# Patient Record
Sex: FEMALE | Race: WHITE | NOT HISPANIC OR LATINO | Employment: OTHER | URBAN - NONMETROPOLITAN AREA
[De-identification: names, ages, dates, MRNs, and addresses within clinical notes are randomized per-mention and may not be internally consistent; named-entity substitution may affect disease eponyms.]

---

## 2017-01-18 ENCOUNTER — OFFICE VISIT (OUTPATIENT)
Dept: OTOLARYNGOLOGY | Facility: CLINIC | Age: 82
End: 2017-01-18

## 2017-01-18 VITALS
DIASTOLIC BLOOD PRESSURE: 79 MMHG | HEART RATE: 85 BPM | RESPIRATION RATE: 20 BRPM | HEIGHT: 59 IN | BODY MASS INDEX: 25 KG/M2 | TEMPERATURE: 97.6 F | SYSTOLIC BLOOD PRESSURE: 145 MMHG | WEIGHT: 124 LBS

## 2017-01-18 DIAGNOSIS — D48.9 NEOPLASM OF UNCERTAIN BEHAVIOR: ICD-10-CM

## 2017-01-18 DIAGNOSIS — C44.42 SQUAMOUS CELL CARCINOMA OF SCALP: Primary | ICD-10-CM

## 2017-01-18 PROCEDURE — 88305 TISSUE EXAM BY PATHOLOGIST: CPT | Performed by: OTOLARYNGOLOGY

## 2017-01-18 PROCEDURE — 99213 OFFICE O/P EST LOW 20 MIN: CPT | Performed by: OTOLARYNGOLOGY

## 2017-01-18 PROCEDURE — 11100 PR BIOPSY OF SKIN LESION: CPT | Performed by: OTOLARYNGOLOGY

## 2017-01-18 RX ORDER — QUINAPRIL 40 MG/1
40 TABLET ORAL 2 TIMES DAILY
COMMUNITY
Start: 2016-10-27 | End: 2019-06-29 | Stop reason: HOSPADM

## 2017-01-18 RX ORDER — TIMOLOL MALEATE 5 MG/ML
1 SOLUTION/ DROPS OPHTHALMIC 2 TIMES DAILY
Status: ON HOLD | COMMUNITY
Start: 2016-10-18 | End: 2019-06-29 | Stop reason: ALTCHOICE

## 2017-01-18 RX ORDER — LEVOTHYROXINE SODIUM 50 MCG
50 TABLET ORAL DAILY
COMMUNITY
Start: 2016-10-27

## 2017-01-18 RX ORDER — BIMATOPROST 0.01 %
1 DROPS OPHTHALMIC (EYE) NIGHTLY
COMMUNITY
Start: 2016-12-31

## 2017-01-18 RX ORDER — TOPIRAMATE 25 MG/1
25 TABLET ORAL AS NEEDED
Status: ON HOLD | COMMUNITY
Start: 2016-10-27 | End: 2019-03-01

## 2017-01-18 RX ORDER — AMLODIPINE BESYLATE 5 MG/1
5 TABLET ORAL 2 TIMES DAILY
Status: ON HOLD | COMMUNITY
Start: 2016-11-10 | End: 2019-03-01

## 2017-01-18 RX ORDER — ALLOPURINOL 300 MG/1
300 TABLET ORAL DAILY
COMMUNITY
Start: 2016-12-11

## 2017-01-18 NOTE — MR AVS SNAPSHOT
Kamille Saul   2017 9:45 AM   Office Visit    Dept Phone:  [phone]   Encounter #:  [id number]    Provider:  Tyree Flanagan MD   Department:  [facility]                Your Full Care Plan              Your Updated Medication List          This list is accurate as of: 17 10:51 AM.  Always use your most recent med list.                allopurinol 300 MG tablet   Commonly known as:  ZYLOPRIM       amLODIPine 5 MG tablet   Commonly known as:  NORVASC       LUMIGAN 0.01 % ophthalmic drops   Generic drug:  bimatoprost       quinapril 40 MG tablet   Commonly known as:  ACCUPRIL       SYNTHROID 50 MCG tablet   Generic drug:  levothyroxine       timolol 0.5 % ophthalmic solution   Commonly known as:  TIMOPTIC       topiramate 25 MG tablet   Commonly known as:  TOPAMAX               You Were Diagnosed With        Codes Comments    Squamous cell carcinoma of scalp    -  Primary ICD-10-CM: C44.42  ICD-9-CM: 173.42     Neoplasm of uncertain behavior     ICD-10-CM: D48.9  ICD-9-CM: 238.9       Instructions    Biopsy care instructions were given.       Patient Instructions History      Upcoming Appointments     Visit Type Date Time Department    FOLLOW UP 2017  9:45 AM [facility]    FOLLOW UP 2017 10:00 AM [facility]      MyChart Signup     Our records indicate that you have declined [facility]. If you would like to sign up for MyChart, please email [email or web address] or call [phone] to obtain an activation code.             Other Info from Your Visit           Your Appointments     2017 10:00 AM CDT   Follow Up with Tyree Flanagan MD   [facility] (--)    [address] [place] [address]   [address] [place] [address] [id number] [phone]           Arrive 15 minutes prior to appointment.              Allergies     Biaxin [Clarithromycin]        Reason for Visit     Follow-up 6 month follow up of scalp lesion  "      Vital Signs     Blood Pressure Pulse Temperature Respirations Height Weight    145/79 85 97.6 °F (36.4 °C) 20 59\" (149.9 cm) 124 lb (56.2 kg)    Body Mass Index Smoking Status                25.04 kg/m2 Former Smoker          Problems and Diagnoses Noted     Squamous cell carcinoma of scalp    -  Primary    Tumor            "

## 2017-01-18 NOTE — PROGRESS NOTES
Skin Cancer Follow-up    Kamille Saul presents for a cancer surveillance and skin check following excision of a squamous cell carcinoma  of the scalp with full-thickness skin graft on 01/26/16.     Subjective: Since surgery, she is doing fairly well, but the following is reported: scaling and scabbing of FTSG. Symptoms denied postoperatively include fever, chills, bleeding and drainage. The patient states the pain has ceased since surgery.     Objective:   Pathology review: Pathology was reviewed previously. . Pathology demonstrates a diagnosis of well-differentiated cystic squamous cell carcinoma, invasive.  with close margins. The deep margin is negative for malignancy with tumor extending to within less than 1mm of the deep margin of surgical excision. The peripheral margins are negative.     Patient presents for follow-up general head and neck skin examination.  Patient has a history of squamous cell carcinoma.  She has not noted recurrence.  The patient has not noted new worrisome lesions. However, she does report rough, scaling, scabbed areas to her FTSG on her scalp. She states these areas come and go. Nothing makes these area better or worse. A biopsy has not been performed.     Past Medical History   Diagnosis Date   • Arthritis    • Fibromyalgia    • Squamous cell carcinoma      Past Surgical History   Procedure Laterality Date   • Appendectomy     • Cataract extraction     • Skin lesion excision       cyst removal    • Skin graft     • Tonsillectomy     • Hysterectomy       History reviewed. No pertinent family history.  Social History   Substance Use Topics   • Smoking status: Former Smoker   • Smokeless tobacco: None   • Alcohol use None     Allergies:  Biaxin [clarithromycin]    Current Outpatient Prescriptions:   •  allopurinol (ZYLOPRIM) 300 MG tablet, , Disp: , Rfl:   •  amLODIPine (NORVASC) 5 MG tablet, , Disp: , Rfl:   •  LUMIGAN 0.01 % ophthalmic drops, , Disp: , Rfl:   •  quinapril (ACCUPRIL)  "40 MG tablet, , Disp: , Rfl:   •  SYNTHROID 50 MCG tablet, , Disp: , Rfl:   •  timolol (TIMOPTIC) 0.5 % ophthalmic solution, , Disp: , Rfl:   •  topiramate (TOPAMAX) 25 MG tablet, , Disp: , Rfl:     Review of Systems   Constitutional: Negative for activity change, appetite change, chills, fatigue, fever and unexpected weight change.   HENT: Negative for congestion, dental problem, facial swelling and nosebleeds.    Eyes: Negative for discharge and redness.   Skin: Negative for color change, pallor and rash.   Hematological: Negative for adenopathy. Does not bruise/bleed easily.          Visit Vitals   • /79   • Pulse 85   • Temp 97.6 °F (36.4 °C)   • Resp 20   • Ht 59\" (149.9 cm)   • Wt 124 lb (56.2 kg)   • BMI 25.04 kg/m2       Physical Exam   Constitutional: She is oriented to person, place, and time. She appears well-developed and well-nourished. She is cooperative. No distress.   HENT:   Head: Normocephalic and atraumatic.   Right Ear: External ear normal.   Left Ear: External ear normal.   Nose: Nose normal.   Mouth/Throat: Oropharynx is clear and moist.   Eyes: Conjunctivae, EOM and lids are normal.   Neck: Phonation normal. Neck supple.   Pulmonary/Chest: Effort normal. No stridor. No respiratory distress.   Lymphadenopathy:        Head (right side): No submental, no submandibular, no tonsillar, no preauricular, no posterior auricular and no occipital adenopathy present.        Head (left side): No submental, no submandibular, no tonsillar, no preauricular, no posterior auricular and no occipital adenopathy present.     She has no cervical adenopathy.   Neurological: She is alert and oriented to person, place, and time. She has normal strength.   Skin: Skin is warm, dry and intact. Lesion (2mm small area of ulceration to center of FTSG) noted.   Psychiatric: She has a normal mood and affect. Her speech is normal and behavior is normal. Judgment and thought content normal.   Vitals " reviewed.      Assessment:  Kamille was seen today for follow-up.    Diagnoses and all orders for this visit:    Squamous cell carcinoma of scalp    Neoplasm of uncertain behavior        Plan:    2mm punch biopsy to ulceration on FTSG to rule out recurrence due to close deep margins- see procedure note. I will call with pathology results. If benign, will follow-up in 6 months.     Patient Instructions   Biopsy care instructions were given.      Return in about 6 months (around 7/18/2017), or if symptoms worsen or fail to improve, for Recheck.      Tyree Flanagan MD   01/18/17  10:36 AM

## 2017-01-20 LAB
CYTO UR: NORMAL
LAB AP CASE REPORT: NORMAL
LAB AP CLINICAL INFORMATION: NORMAL
Lab: NORMAL
PATH REPORT.FINAL DX SPEC: NORMAL
PATH REPORT.GROSS SPEC: NORMAL

## 2017-01-23 ENCOUNTER — TELEPHONE (OUTPATIENT)
Dept: OTOLARYNGOLOGY | Facility: CLINIC | Age: 82
End: 2017-01-23

## 2017-01-23 NOTE — TELEPHONE ENCOUNTER
Patient notified of scalp biopsy result from 01/18/2017-Advised patient to keep follow up in 6 months-  ---- Message from Tyree Flanagan MD sent at 1/18/2017 10:47 AM [facility] -----

## 2017-03-23 ENCOUNTER — HOSPITAL ENCOUNTER (OUTPATIENT)
Facility: HOSPITAL | Age: 82
Setting detail: OBSERVATION
LOS: 1 days | Discharge: HOME | End: 2017-03-25
Attending: EMERGENCY MEDICINE | Admitting: FAMILY MEDICINE

## 2017-03-23 ENCOUNTER — HOSPITAL ENCOUNTER (EMERGENCY)
Age: 82
Discharge: LEFT BEFORE DISCHARGE | End: 2017-03-23

## 2017-03-23 ENCOUNTER — APPOINTMENT (OUTPATIENT)
Dept: CT IMAGING | Facility: HOSPITAL | Age: 82
End: 2017-03-23

## 2017-03-23 ENCOUNTER — APPOINTMENT (OUTPATIENT)
Dept: GENERAL RADIOLOGY | Facility: HOSPITAL | Age: 82
End: 2017-03-23

## 2017-03-23 VITALS
HEART RATE: 91 BPM | SYSTOLIC BLOOD PRESSURE: 189 MMHG | WEIGHT: 125 LBS | BODY MASS INDEX: 23 KG/M2 | HEIGHT: 62 IN | RESPIRATION RATE: 16 BRPM | TEMPERATURE: 97.5 F | DIASTOLIC BLOOD PRESSURE: 93 MMHG | OXYGEN SATURATION: 98 %

## 2017-03-23 DIAGNOSIS — R41.82 ALTERED MENTAL STATUS, UNSPECIFIED ALTERED MENTAL STATUS TYPE: ICD-10-CM

## 2017-03-23 DIAGNOSIS — E87.6 HYPOKALEMIA: ICD-10-CM

## 2017-03-23 DIAGNOSIS — N30.00 ACUTE CYSTITIS WITHOUT HEMATURIA: ICD-10-CM

## 2017-03-23 DIAGNOSIS — N17.9 ACUTE KIDNEY INJURY (HCC): Primary | ICD-10-CM

## 2017-03-23 LAB
ALBUMIN SERPL-MCNC: 4 G/DL (ref 3.5–5)
ALBUMIN/GLOB SERPL: 1.5 G/DL (ref 1.1–2.5)
ALP SERPL-CCNC: 61 U/L (ref 24–120)
ALT SERPL W P-5'-P-CCNC: 16 U/L (ref 0–54)
ANION GAP SERPL CALCULATED.3IONS-SCNC: 13 MMOL/L (ref 4–13)
AST SERPL-CCNC: 35 U/L (ref 7–45)
BASOPHILS # BLD AUTO: 0.11 10*3/MM3 (ref 0–0.2)
BASOPHILS NFR BLD AUTO: 1.2 % (ref 0–2)
BILIRUB SERPL-MCNC: 0.3 MG/DL (ref 0.1–1)
BUN BLD-MCNC: 30 MG/DL (ref 5–21)
BUN/CREAT SERPL: 19.7 (ref 7–25)
CALCIUM SPEC-SCNC: 9.4 MG/DL (ref 8.4–10.4)
CHLORIDE SERPL-SCNC: 106 MMOL/L (ref 98–110)
CO2 SERPL-SCNC: 21 MMOL/L (ref 24–31)
CREAT BLD-MCNC: 1.52 MG/DL (ref 0.5–1.4)
DEPRECATED RDW RBC AUTO: 49.6 FL (ref 40–54)
EOSINOPHIL # BLD AUTO: 0.29 10*3/MM3 (ref 0–0.7)
EOSINOPHIL NFR BLD AUTO: 3.3 % (ref 0–4)
ERYTHROCYTE [DISTWIDTH] IN BLOOD BY AUTOMATED COUNT: 14.5 % (ref 12–15)
GFR SERPL CREATININE-BSD FRML MDRD: 32 ML/MIN/1.73
GLOBULIN UR ELPH-MCNC: 2.7 GM/DL
GLUCOSE BLD-MCNC: 130 MG/DL (ref 70–100)
GLUCOSE BLDC GLUCOMTR-MCNC: 136 MG/DL (ref 70–130)
HCT VFR BLD AUTO: 35.1 % (ref 37–47)
HGB BLD-MCNC: 11.5 G/DL (ref 12–16)
IMM GRANULOCYTES # BLD: 0.03 10*3/MM3 (ref 0–0.03)
IMM GRANULOCYTES NFR BLD: 0.3 % (ref 0–5)
LYMPHOCYTES # BLD AUTO: 2.09 10*3/MM3 (ref 0.72–4.86)
LYMPHOCYTES NFR BLD AUTO: 23.6 % (ref 15–45)
MCH RBC QN AUTO: 30.7 PG (ref 28–32)
MCHC RBC AUTO-ENTMCNC: 32.8 G/DL (ref 33–36)
MCV RBC AUTO: 93.9 FL (ref 82–98)
MONOCYTES # BLD AUTO: 1.03 10*3/MM3 (ref 0.19–1.3)
MONOCYTES NFR BLD AUTO: 11.6 % (ref 4–12)
NEUTROPHILS # BLD AUTO: 5.32 10*3/MM3 (ref 1.87–8.4)
NEUTROPHILS NFR BLD AUTO: 60 % (ref 39–78)
PLATELET # BLD AUTO: 238 10*3/MM3 (ref 130–400)
PMV BLD AUTO: 9.5 FL (ref 6–12)
POTASSIUM BLD-SCNC: 3.7 MMOL/L (ref 3.5–5.3)
PROT SERPL-MCNC: 6.7 G/DL (ref 6.3–8.7)
RBC # BLD AUTO: 3.74 10*6/MM3 (ref 4.2–5.4)
SODIUM BLD-SCNC: 140 MMOL/L (ref 135–145)
WBC NRBC COR # BLD: 8.87 10*3/MM3 (ref 4.8–10.8)

## 2017-03-23 PROCEDURE — 80053 COMPREHEN METABOLIC PANEL: CPT | Performed by: EMERGENCY MEDICINE

## 2017-03-23 PROCEDURE — 71010 HC CHEST PA OR AP: CPT

## 2017-03-23 PROCEDURE — 82962 GLUCOSE BLOOD TEST: CPT

## 2017-03-23 PROCEDURE — 4500000002 HC ER NO CHARGE

## 2017-03-23 PROCEDURE — 70450 CT HEAD/BRAIN W/O DYE: CPT

## 2017-03-23 PROCEDURE — 99285 EMERGENCY DEPT VISIT HI MDM: CPT

## 2017-03-23 PROCEDURE — 85025 COMPLETE CBC W/AUTO DIFF WBC: CPT | Performed by: EMERGENCY MEDICINE

## 2017-03-23 RX ORDER — LABETALOL HYDROCHLORIDE 5 MG/ML
20 INJECTION, SOLUTION INTRAVENOUS ONCE
Status: DISCONTINUED | OUTPATIENT
Start: 2017-03-23 | End: 2017-03-24

## 2017-03-23 RX ORDER — SODIUM CHLORIDE 0.9 % (FLUSH) 0.9 %
10 SYRINGE (ML) INJECTION AS NEEDED
Status: DISCONTINUED | OUTPATIENT
Start: 2017-03-23 | End: 2017-03-24

## 2017-03-24 ENCOUNTER — APPOINTMENT (OUTPATIENT)
Dept: MRI IMAGING | Facility: HOSPITAL | Age: 82
End: 2017-03-24

## 2017-03-24 PROBLEM — N39.0 UTI (URINARY TRACT INFECTION): Status: ACTIVE | Noted: 2017-03-24

## 2017-03-24 PROBLEM — R41.82 ALTERED MENTAL STATUS: Status: ACTIVE | Noted: 2017-03-24

## 2017-03-24 LAB
ANION GAP SERPL CALCULATED.3IONS-SCNC: 11 MMOL/L (ref 4–13)
BACTERIA UR QL AUTO: ABNORMAL /HPF
BILIRUB UR QL STRIP: NEGATIVE
BUN BLD-MCNC: 25 MG/DL (ref 5–21)
BUN/CREAT SERPL: 17.1 (ref 7–25)
CALCIUM SPEC-SCNC: 9.5 MG/DL (ref 8.4–10.4)
CHLORIDE SERPL-SCNC: 108 MMOL/L (ref 98–110)
CLARITY UR: ABNORMAL
CO2 SERPL-SCNC: 25 MMOL/L (ref 24–31)
COLOR UR: YELLOW
CREAT BLD-MCNC: 1.46 MG/DL (ref 0.5–1.4)
DEPRECATED RDW RBC AUTO: 49.6 FL (ref 40–54)
ERYTHROCYTE [DISTWIDTH] IN BLOOD BY AUTOMATED COUNT: 14.6 % (ref 12–15)
GFR SERPL CREATININE-BSD FRML MDRD: 34 ML/MIN/1.73
GLUCOSE BLD-MCNC: 96 MG/DL (ref 70–100)
GLUCOSE UR STRIP-MCNC: NEGATIVE MG/DL
HCT VFR BLD AUTO: 38.3 % (ref 37–47)
HGB BLD-MCNC: 12.3 G/DL (ref 12–16)
HGB UR QL STRIP.AUTO: ABNORMAL
HOLD SPECIMEN: NORMAL
HOLD SPECIMEN: NORMAL
HYALINE CASTS UR QL AUTO: ABNORMAL /LPF
KETONES UR QL STRIP: NEGATIVE
LEUKOCYTE ESTERASE UR QL STRIP.AUTO: ABNORMAL
MCH RBC QN AUTO: 30.2 PG (ref 28–32)
MCHC RBC AUTO-ENTMCNC: 32.1 G/DL (ref 33–36)
MCV RBC AUTO: 94.1 FL (ref 82–98)
NITRITE UR QL STRIP: POSITIVE
PH UR STRIP.AUTO: 6 [PH] (ref 5–8)
PLATELET # BLD AUTO: 246 10*3/MM3 (ref 130–400)
PMV BLD AUTO: 9.4 FL (ref 6–12)
POTASSIUM BLD-SCNC: 3.6 MMOL/L (ref 3.5–5.3)
PROT UR QL STRIP: ABNORMAL
RBC # BLD AUTO: 4.07 10*6/MM3 (ref 4.2–5.4)
RBC # UR: ABNORMAL /HPF
REF LAB TEST METHOD: ABNORMAL
SODIUM BLD-SCNC: 144 MMOL/L (ref 135–145)
SP GR UR STRIP: 1.01 (ref 1–1.03)
SQUAMOUS #/AREA URNS HPF: ABNORMAL /HPF
UROBILINOGEN UR QL STRIP: ABNORMAL
WBC NRBC COR # BLD: 8.3 10*3/MM3 (ref 4.8–10.8)
WBC UR QL AUTO: ABNORMAL /HPF
WHOLE BLOOD HOLD SPECIMEN: NORMAL
WHOLE BLOOD HOLD SPECIMEN: NORMAL

## 2017-03-24 PROCEDURE — G0378 HOSPITAL OBSERVATION PER HR: HCPCS

## 2017-03-24 PROCEDURE — 70551 MRI BRAIN STEM W/O DYE: CPT

## 2017-03-24 PROCEDURE — 87086 URINE CULTURE/COLONY COUNT: CPT | Performed by: EMERGENCY MEDICINE

## 2017-03-24 PROCEDURE — 25010000002 HYDRALAZINE PER 20 MG: Performed by: INTERNAL MEDICINE

## 2017-03-24 PROCEDURE — 81001 URINALYSIS AUTO W/SCOPE: CPT | Performed by: EMERGENCY MEDICINE

## 2017-03-24 PROCEDURE — 85027 COMPLETE CBC AUTOMATED: CPT | Performed by: INTERNAL MEDICINE

## 2017-03-24 PROCEDURE — 80048 BASIC METABOLIC PNL TOTAL CA: CPT | Performed by: INTERNAL MEDICINE

## 2017-03-24 PROCEDURE — 87077 CULTURE AEROBIC IDENTIFY: CPT | Performed by: EMERGENCY MEDICINE

## 2017-03-24 PROCEDURE — 96374 THER/PROPH/DIAG INJ IV PUSH: CPT

## 2017-03-24 PROCEDURE — 25010000002 CEFTRIAXONE: Performed by: INTERNAL MEDICINE

## 2017-03-24 PROCEDURE — 87186 SC STD MICRODIL/AGAR DIL: CPT | Performed by: EMERGENCY MEDICINE

## 2017-03-24 RX ORDER — HYDRALAZINE HYDROCHLORIDE 20 MG/ML
10 INJECTION INTRAMUSCULAR; INTRAVENOUS EVERY 4 HOURS PRN
Status: DISCONTINUED | OUTPATIENT
Start: 2017-03-24 | End: 2017-03-25 | Stop reason: HOSPADM

## 2017-03-24 RX ORDER — SODIUM CHLORIDE 450 MG/100ML
100 INJECTION, SOLUTION INTRAVENOUS CONTINUOUS
Status: DISCONTINUED | OUTPATIENT
Start: 2017-03-24 | End: 2017-03-25 | Stop reason: HOSPADM

## 2017-03-24 RX ORDER — TIMOLOL MALEATE 5 MG/ML
1 SOLUTION/ DROPS OPHTHALMIC DAILY
Status: DISCONTINUED | OUTPATIENT
Start: 2017-03-24 | End: 2017-03-25 | Stop reason: HOSPADM

## 2017-03-24 RX ORDER — ALLOPURINOL 300 MG/1
300 TABLET ORAL DAILY
Status: DISCONTINUED | OUTPATIENT
Start: 2017-03-24 | End: 2017-03-25 | Stop reason: HOSPADM

## 2017-03-24 RX ORDER — NIACIN 500 MG
500 TABLET ORAL 2 TIMES DAILY WITH MEALS
COMMUNITY
End: 2019-06-29 | Stop reason: HOSPADM

## 2017-03-24 RX ORDER — ACETAMINOPHEN 325 MG/1
650 TABLET ORAL EVERY 6 HOURS PRN
Status: DISCONTINUED | OUTPATIENT
Start: 2017-03-24 | End: 2017-03-25 | Stop reason: HOSPADM

## 2017-03-24 RX ORDER — AMLODIPINE BESYLATE 5 MG/1
5 TABLET ORAL EVERY 12 HOURS SCHEDULED
Status: DISCONTINUED | OUTPATIENT
Start: 2017-03-24 | End: 2017-03-25 | Stop reason: HOSPADM

## 2017-03-24 RX ORDER — QUINAPRIL 20 MG/1
40 TABLET ORAL DAILY PRN
Status: DISCONTINUED | OUTPATIENT
Start: 2017-03-24 | End: 2017-03-25 | Stop reason: HOSPADM

## 2017-03-24 RX ORDER — LEVOTHYROXINE SODIUM 0.05 MG/1
50 TABLET ORAL EVERY MORNING
Status: DISCONTINUED | OUTPATIENT
Start: 2017-03-24 | End: 2017-03-25 | Stop reason: HOSPADM

## 2017-03-24 RX ADMIN — BIMATOPROST 1 DROP: 0.1 SOLUTION/ DROPS OPHTHALMIC at 20:45

## 2017-03-24 RX ADMIN — HYDRALAZINE HYDROCHLORIDE 10 MG: 20 INJECTION INTRAMUSCULAR; INTRAVENOUS at 02:16

## 2017-03-24 RX ADMIN — SODIUM CHLORIDE 100 ML/HR: 4.5 INJECTION, SOLUTION INTRAVENOUS at 18:11

## 2017-03-24 RX ADMIN — CEFTRIAXONE 1 G: 1 INJECTION, POWDER, FOR SOLUTION INTRAMUSCULAR; INTRAVENOUS at 03:37

## 2017-03-24 RX ADMIN — AMLODIPINE BESYLATE 5 MG: 5 TABLET ORAL at 21:39

## 2017-03-24 RX ADMIN — ACETAMINOPHEN 650 MG: 325 TABLET ORAL at 10:53

## 2017-03-24 RX ADMIN — HYDRALAZINE HYDROCHLORIDE 10 MG: 20 INJECTION INTRAMUSCULAR; INTRAVENOUS at 20:41

## 2017-03-24 RX ADMIN — TIMOLOL MALEATE 1 DROP: 5 SOLUTION/ DROPS OPHTHALMIC at 18:10

## 2017-03-24 RX ADMIN — SODIUM CHLORIDE 100 ML/HR: 4.5 INJECTION, SOLUTION INTRAVENOUS at 02:15

## 2017-03-24 NOTE — ED PROVIDER NOTES
Subjective   Patient is a 89 y.o. female presenting with altered mental status.   Altered Mental Status   Presenting symptoms: behavior changes, confusion, disorientation and memory loss    Presenting symptoms: no lethargy    Severity:  Moderate  Most recent episode:  Today  Episode history:  Single  Timing:  Constant  Progression:  Unchanged  Chronicity:  New  Context: not alcohol use, not dementia, not drug use, not head injury, not homeless, taking medications as prescribed, not nursing home resident, not recent change in medication, not recent illness and not recent infection    Associated symptoms: no abdominal pain, normal movement, no agitation, no bladder incontinence, no decreased appetite, no depression, no difficulty breathing, no eye deviation, no fever, no hallucinations, no headaches, no light-headedness, no nausea, no palpitations, no rash, no seizures, no slurred speech, no suicidal behavior, no visual change, no vomiting and no weakness        Review of Systems   Constitutional: Negative.  Negative for appetite change, chills, decreased appetite, diaphoresis, fatigue and fever.   HENT: Negative.  Negative for congestion, dental problem, drooling, facial swelling, nosebleeds, sinus pressure and trouble swallowing.    Eyes: Negative.  Negative for discharge and redness.   Respiratory: Negative.  Negative for apnea, cough, choking, chest tightness, shortness of breath, wheezing and stridor.    Cardiovascular: Negative.  Negative for chest pain, palpitations and leg swelling.   Gastrointestinal: Negative.  Negative for abdominal distention, abdominal pain, diarrhea, nausea and vomiting.   Endocrine: Negative.  Negative for cold intolerance, heat intolerance and polydipsia.   Genitourinary: Negative.  Negative for bladder incontinence, frequency and urgency.   Musculoskeletal: Negative.  Negative for arthralgias, back pain, joint swelling and myalgias.   Skin: Negative.  Negative for color change, pallor  and rash.   Allergic/Immunologic: Negative.  Negative for environmental allergies, food allergies and immunocompromised state.   Neurological: Negative.  Negative for dizziness, seizures, syncope, speech difficulty, weakness, light-headedness, numbness and headaches.   Hematological: Negative.  Negative for adenopathy.   Psychiatric/Behavioral: Positive for confusion and memory loss. Negative for agitation and hallucinations.   All other systems reviewed and are negative.      Past Medical History:   Diagnosis Date   • Arthritis    • Fibromyalgia    • Hypertension    • Squamous cell carcinoma        Allergies   Allergen Reactions   • Biaxin [Clarithromycin]        Past Surgical History:   Procedure Laterality Date   • APPENDECTOMY     • CATARACT EXTRACTION     • HYSTERECTOMY     • SKIN GRAFT     • SKIN LESION EXCISION      cyst removal    • TONSILLECTOMY         History reviewed. No pertinent family history.    Social History     Social History   • Marital status:      Spouse name: N/A   • Number of children: N/A   • Years of education: N/A     Social History Main Topics   • Smoking status: Former Smoker   • Smokeless tobacco: None   • Alcohol use Yes      Comment: Occasional wine   • Drug use: No   • Sexual activity: Not Asked     Other Topics Concern   • None     Social History Narrative           Objective   Physical Exam   Constitutional: She is oriented to person, place, and time. She appears well-developed.  Non-toxic appearance. No distress.   HENT:   Head: Normocephalic and atraumatic.   Mouth/Throat: Uvula is midline and mucous membranes are normal.   Eyes: Lids are normal. Pupils are equal, round, and reactive to light. Lids are everted and swept, no foreign bodies found.   Neck: Trachea normal, normal range of motion, full passive range of motion without pain and phonation normal. Neck supple. Normal carotid pulses and no JVD present. Carotid bruit is not present. No rigidity. No tracheal deviation  present. No Brudzinski's sign and no Kernig's sign noted. No thyromegaly present.   Cardiovascular: Normal rate, regular rhythm, normal heart sounds, intact distal pulses and normal pulses.    Pulmonary/Chest: Effort normal and breath sounds normal. No stridor. No apnea and no tachypnea. No respiratory distress.   Abdominal: Soft. Normal appearance, normal aorta and bowel sounds are normal. She exhibits no distension and no mass. There is no hepatosplenomegaly. There is no tenderness. There is no guarding.   Musculoskeletal: Normal range of motion.       Vascular Status -  Her exam exhibits right foot vasculature normal. Her exam exhibits no right foot edema. Her exam exhibits left foot vasculature normal. Her exam exhibits no left foot edema.  Neurological: She is alert and oriented to person, place, and time. She has normal strength and normal reflexes. She is not disoriented. She displays normal reflexes. No cranial nerve deficit or sensory deficit. She exhibits normal muscle tone. Coordination normal. GCS eye subscore is 4. GCS verbal subscore is 5. GCS motor subscore is 6.   Reflex Scores:       Tricep reflexes are 2+ on the right side and 2+ on the left side.       Bicep reflexes are 2+ on the right side and 2+ on the left side.       Patellar reflexes are 2+ on the right side and 2+ on the left side.       Achilles reflexes are 2+ on the right side and 2+ on the left side.  Skin: Skin is warm, dry and intact. She is not diaphoretic. No cyanosis. No pallor. Nails show no clubbing.   Nursing note and vitals reviewed.      Procedures         ED Course  ED Course   Comment By Time   Patient is not a TPA candidate the symptoms started at approximately 3:30 PM and therefore this is more than 4 hours after the time of onset of symptoms at the patient came to the ER Michael Jane MD 03/23 2131   Ct head isneg Michael Jane MD 03/23 0580                  MDM      Final diagnoses:   Altered mental status, unspecified  altered mental status type            Michael Jane MD  03/28/17 0656       Michael Jane MD  03/28/17 0705

## 2017-03-24 NOTE — PROGRESS NOTES
Discharge Planning Assessment  [facility]     Patient Name: Kamille Saul  MRN: [id number]  Today's Date: 3/24/2017    Admit Date: 3/23/2017          Discharge Needs Assessment       03/24/17 1435    Living Environment    Lives With alone    Living Arrangements house    Provides Primary Care For no one    Able to Return to Prior Living Arrangements yes    Discharge Needs Assessment    Concerns To Be Addressed denies needs/concerns at this time    Anticipated Changes Related to Illness none    Equipment Currently Used at Home none    Equipment Needed After Discharge none    Transportation Available car            Discharge Plan       03/24/17 1436    Case Management/Social Work Plan    Plan Home    Additional Comments Spoke with pt (which seems alert and oreinted while this SW was speaking with her) to assess for home needs.  Pt lives alone and plans same.  Pt says she is independent and has no DME nor HH needs.  Hopefully she will remain oriented once condition improves.          Discharge Placement     No information found                Demographic Summary     None            Functional Status     None            Psychosocial     None            Abuse/Neglect     None            Legal     None            Substance Abuse     None            Patient Forms     None          BHARGAVI Hill

## 2017-03-24 NOTE — ED NOTES
"Patient refused in and out catheter at this time stating, \"I'll use a bedpan.\" Nurse explained that the doctor had ordered an in and out catheter in order to reduce possible contamination. Patient continued to refuse in and out catheter at this time.     Nasrin Alanis, RN  03/23/17 8512    "

## 2017-03-24 NOTE — PROGRESS NOTES
[facility]  INPATIENT PROGRESS NOTE    Length of Stay: 0  Date of Admission: 3/23/2017  Primary Care Physician: Toi Negrete MD    Subjective   Chief Complaint: None    HPI   Feeling much better today, no confusion, pain, or shortness of breath. No other symptoms of urinary tract infection; burning, urgency, odor.  Family at bedside.  No needs voiced.     Review of Systems   All pertinent negatives and positives are as above. All other systems have been reviewed and are negative unless otherwise stated.     Objective    Temp:  [97.7 °F (36.5 °C)-98.4 °F (36.9 °C)] 97.7 °F (36.5 °C)  Heart Rate:  [74-90] 86  Resp:  [14-18] 16  BP: (106-175)/() 155/88    Physical Exam   Constitutional: She is oriented to person, place, and time. She appears well-developed and well-nourished. No distress.   HENT:   Head: Normocephalic and atraumatic.   Eyes: Conjunctivae and EOM are normal. Pupils are equal, round, and reactive to light. No scleral icterus.   Neck: Normal range of motion. Neck supple. No JVD present. No tracheal deviation present.   Cardiovascular: Normal rate, regular rhythm, normal heart sounds and intact distal pulses.  Exam reveals no gallop.    No murmur heard.  Pulmonary/Chest: Effort normal and breath sounds normal. No respiratory distress. She has no wheezes. She has no rales.   Abdominal: Soft. Bowel sounds are normal. She exhibits no distension. There is no tenderness. There is no guarding.   Musculoskeletal: Normal range of motion. She exhibits no edema.   Neurological: She is alert and oriented to person, place, and time.   No obvious deficits noted.   Skin: Skin is warm and dry. No rash noted. She is not diaphoretic. No erythema. No pallor.   Psychiatric: She has a normal mood and affect. Her behavior is normal.   Vitals reviewed.      Results Review:  Recent Results (from the past 12 hour(s))   CBC (No Diff)    Collection Time: 03/24/17   6:36 AM   Result Value Ref Range    WBC 8.30 4.80 - 10.80 10*3/mm3    RBC 4.07 (L) 4.20 - 5.40 10*6/mm3    Hemoglobin 12.3 12.0 - 16.0 g/dL    Hematocrit 38.3 37.0 - 47.0 %    MCV 94.1 82.0 - 98.0 fL    MCH 30.2 28.0 - 32.0 pg    MCHC 32.1 (L) 33.0 - 36.0 g/dL    RDW 14.6 12.0 - 15.0 %    RDW-SD 49.6 40.0 - 54.0 fl    MPV 9.4 6.0 - 12.0 fL    Platelets 246 130 - 400 10*3/mm3   Basic Metabolic Panel    Collection Time: 03/24/17  6:36 AM   Result Value Ref Range    Glucose 96 70 - 100 mg/dL    BUN 25 (H) 5 - 21 mg/dL    Creatinine 1.46 (H) 0.50 - 1.40 mg/dL    Sodium 144 135 - 145 mmol/L    Potassium 3.6 3.5 - 5.3 mmol/L    Chloride 108 98 - 110 mmol/L    CO2 25.0 24.0 - 31.0 mmol/L    Calcium 9.5 8.4 - 10.4 mg/dL    eGFR Non African Amer 34 (L) >60 mL/min/1.73    BUN/Creatinine Ratio 17.1 7.0 - 25.0    Anion Gap 11.0 4.0 - 13.0 mmol/L       Cultures:       Radiology Data:    Imaging Results (last 24 hours)     Procedure Component Value Units Date/Time    XR Chest 1 View [[id number]] Collected:  03/23/17 2202     Updated:  03/23/17 2227    Narrative:       EXAMINATION:   XR CHEST 1 VW-  3/23/2017 10:00 PM CDT     HISTORY: Short of breath.     A single view of the chest is obtained. The lungs are clear. The cardiac  silhouette is normal. Vascular calcification is present in the aortic  arch. Pleural surfaces are unremarkable.     This is compared to the prior study of 01/19/2016.       Impression:       Stable chest. There is no evidence of active disease.  This report was finalized on 03/23/2017 22:24 by Dr. Johnny Reyes MD.    CT Head Without Contrast [[id number]] Collected:  03/24/17 0808     Updated:  03/24/17 0815    Narrative:       CT BRAIN without contrast dated 3/23/2017 21:30 CST     HISTORY: Mental status change, history of squamous cell carcinoma to top  of head, lesion excision, hypertension     COMPARISON: 04/20/2014      DOSE LENGTH PRODUCT: 743 mGy cm     In order to have a CT radiation dose as low as  reasonably achievable,  Automated Exposure Control was utilized for adjustment of the mA and/or  KV according to patient size.     TECHNIQUE: Serial axial tomographic images of the brain were obtained  without the use of intravenous contrast.      FINDINGS:   There is mild atrophy. Low-attenuation in the subcortical and  periventricular white matter. No mass effect or midline shift. No  abnormal extra-axial blood products. Surrounding soft tissue structures  and osseous structures demonstrate no acute findings. Soft tissue  abnormality over the vertex likely corresponds to the patient's known  history of squamous cell carcinoma status post excision.       Impression:       1. No definite acute intracranial process. There is atrophy and fairly  extensive white matter change, likely chronic microvascular ischemia. If  there is high clinical concern for acute ischemia, MRI could be  obtained.     A preliminary report was provided by [facility].   This report was finalized on 03/24/2017 08:12 by Dr. Hebert Sanders MD.    MRI Brain Without Contrast [[id number]] Collected:  03/24/17 1021     Updated:  03/24/17 1046    Narrative:       MRI BRAIN WITHOUT CONTRAST 3/24/2017 8:30 CST     HISTORY: ams     COMPARISON: CT brain from 03/23/2017       TECHNIQUE: Multiplanar imaging of the brain was performed in a routine  fashion.      FINDINGS:   Midline structures are nondisplaced. There is mild atrophy. There is no  significant mass effect or hydrocephalus. Basilar cisterns are  preserved. There are scattered foci of increased FLAIR signal intensity  throughout the periventricular white matter and to a lesser degree  scattered about the subcortical white matter. No abnormal extra axial  fluid collections are noted. No restriction of diffusion is present.      Proximal cervical spinal cord, brainstem, and cerebellum are  unremarkable. Normal cerebrovascular flow voids are seen. Bilateral  globes and orbits are normal in appearance.      No abnormal signal is noted in the mastoid air cells or paranasal  sinuses.        Impression:       Changes suggestive of atrophy and chronic microvascular ischemia without  evidence of acute intracranial process.        This report was finalized on 03/24/2017 10:43 by Dr. Hebert Sanders MD.            Intake/Output Summary (Last 24 hours) at 03/24/17 1659  Last data filed at 03/24/17 1442   Gross per 24 hour   Intake              480 ml   Output              350 ml   Net              130 ml       Allergies   Allergen Reactions   • Biaxin [Clarithromycin]        Scheduled meds:     ceftriaxone 1 g Intravenous Q24H       PRN meds:  •  acetaminophen  •  hydrALAZINE    Assessment/Plan     Active Problems:    Altered mental status  UTI  Hypertensioin  CKD III  Hypothyroidism    Plan:  1. Day 1 Rocephin  2. Await urine culture  3. Home meds reviewed and restarted  4. CBC, BMP, TSH in am  5. Continue hydration    Discharge Planning:  Home in Am     CLARK Alarcon   03/24/17   4:59 PM

## 2017-03-24 NOTE — PLAN OF CARE
Problem: Patient Care Overview (Adult)  Goal: Plan of Care Review  Outcome: Ongoing (interventions implemented as appropriate)    03/24/17 0407   Coping/Psychosocial Response Interventions   Plan Of Care Reviewed With patient   Patient Care Overview   Progress no change   Outcome Evaluation   Outcome Summary/Follow up Plan patient disoriented to time. hydralizine given for elevated BP, other VSS. safety maintained.         Problem: Fall Risk (Adult)  Goal: Identify Related Risk Factors and Signs and Symptoms  Outcome: Outcome(s) achieved Date Met:  03/24/17  Goal: Absence of Falls  Outcome: Ongoing (interventions implemented as appropriate)    Problem: Confusion, Acute (Adult)  Goal: Identify Related Risk Factors and Signs and Symptoms  Outcome: Outcome(s) achieved Date Met:  03/24/17  Goal: Cognitive/Functional Impairments Minimized  Outcome: Ongoing (interventions implemented as appropriate)  Goal: Safety  Outcome: Ongoing (interventions implemented as appropriate)

## 2017-03-24 NOTE — PLAN OF CARE
Problem: Patient Care Overview (Adult)  Goal: Plan of Care Review  Outcome: Ongoing (interventions implemented as appropriate)    03/24/17 1606   Coping/Psychosocial Response Interventions   Plan Of Care Reviewed With patient   Patient Care Overview   Progress no change   Outcome Evaluation   Outcome Summary/Follow up Plan Up with assist X 1. C/O headache. No new deficits noted. No new skin breakdown noted.       Goal: Adult Individualization and Mutuality  Outcome: Ongoing (interventions implemented as appropriate)  Goal: Discharge Needs Assessment  Outcome: Ongoing (interventions implemented as appropriate)    Problem: Fall Risk (Adult)  Goal: Absence of Falls  Outcome: Ongoing (interventions implemented as appropriate)    Problem: Confusion, Acute (Adult)  Goal: Cognitive/Functional Impairments Minimized  Outcome: Ongoing (interventions implemented as appropriate)  Goal: Safety  Outcome: Ongoing (interventions implemented as appropriate)    Problem: Stroke (Ischemic) (Adult)  Goal: Signs and Symptoms of Listed Potential Problems Will be Absent or Manageable (Stroke)  Outcome: Ongoing (interventions implemented as appropriate)

## 2017-03-25 VITALS
BODY MASS INDEX: 23.91 KG/M2 | RESPIRATION RATE: 18 BRPM | OXYGEN SATURATION: 97 % | WEIGHT: 121.8 LBS | HEART RATE: 90 BPM | DIASTOLIC BLOOD PRESSURE: 81 MMHG | TEMPERATURE: 98 F | HEIGHT: 60 IN | SYSTOLIC BLOOD PRESSURE: 157 MMHG

## 2017-03-25 LAB
ANION GAP SERPL CALCULATED.3IONS-SCNC: 12 MMOL/L (ref 4–13)
BUN BLD-MCNC: 21 MG/DL (ref 5–21)
BUN/CREAT SERPL: 16.7 (ref 7–25)
CALCIUM SPEC-SCNC: 9.3 MG/DL (ref 8.4–10.4)
CHLORIDE SERPL-SCNC: 103 MMOL/L (ref 98–110)
CO2 SERPL-SCNC: 22 MMOL/L (ref 24–31)
CREAT BLD-MCNC: 1.26 MG/DL (ref 0.5–1.4)
DEPRECATED RDW RBC AUTO: 49.9 FL (ref 40–54)
ERYTHROCYTE [DISTWIDTH] IN BLOOD BY AUTOMATED COUNT: 14.6 % (ref 12–15)
GFR SERPL CREATININE-BSD FRML MDRD: 40 ML/MIN/1.73
GLUCOSE BLD-MCNC: 126 MG/DL (ref 70–100)
HCT VFR BLD AUTO: 38.2 % (ref 37–47)
HGB BLD-MCNC: 12.4 G/DL (ref 12–16)
MCH RBC QN AUTO: 30.2 PG (ref 28–32)
MCHC RBC AUTO-ENTMCNC: 32.5 G/DL (ref 33–36)
MCV RBC AUTO: 92.9 FL (ref 82–98)
PLATELET # BLD AUTO: 227 10*3/MM3 (ref 130–400)
PMV BLD AUTO: 9.5 FL (ref 6–12)
POTASSIUM BLD-SCNC: 3.4 MMOL/L (ref 3.5–5.3)
RBC # BLD AUTO: 4.11 10*6/MM3 (ref 4.2–5.4)
SODIUM BLD-SCNC: 137 MMOL/L (ref 135–145)
TSH SERPL DL<=0.05 MIU/L-ACNC: 1.1 MIU/ML (ref 0.47–4.68)
WBC NRBC COR # BLD: 8.45 10*3/MM3 (ref 4.8–10.8)

## 2017-03-25 PROCEDURE — G0378 HOSPITAL OBSERVATION PER HR: HCPCS

## 2017-03-25 PROCEDURE — 25010000002 CEFTRIAXONE: Performed by: INTERNAL MEDICINE

## 2017-03-25 PROCEDURE — 80048 BASIC METABOLIC PNL TOTAL CA: CPT | Performed by: NURSE PRACTITIONER

## 2017-03-25 PROCEDURE — 85027 COMPLETE CBC AUTOMATED: CPT | Performed by: NURSE PRACTITIONER

## 2017-03-25 PROCEDURE — 84443 ASSAY THYROID STIM HORMONE: CPT | Performed by: INTERNAL MEDICINE

## 2017-03-25 RX ORDER — POTASSIUM CHLORIDE 750 MG/1
40 CAPSULE, EXTENDED RELEASE ORAL ONCE
Status: COMPLETED | OUTPATIENT
Start: 2017-03-25 | End: 2017-03-25

## 2017-03-25 RX ORDER — TOPIRAMATE 25 MG/1
25 CAPSULE, COATED PELLETS ORAL EVERY 12 HOURS PRN
Status: DISCONTINUED | OUTPATIENT
Start: 2017-03-25 | End: 2017-03-25 | Stop reason: HOSPADM

## 2017-03-25 RX ORDER — CEFDINIR 300 MG/1
300 CAPSULE ORAL 2 TIMES DAILY
Qty: 10 CAPSULE | Refills: 0 | Status: SHIPPED | OUTPATIENT
Start: 2017-03-25 | End: 2017-03-30

## 2017-03-25 RX ADMIN — TOPIRAMATE 25 MG: 25 CAPSULE, COATED PELLETS ORAL at 01:35

## 2017-03-25 RX ADMIN — AMLODIPINE BESYLATE 5 MG: 5 TABLET ORAL at 09:10

## 2017-03-25 RX ADMIN — LEVOTHYROXINE SODIUM 50 MCG: 50 TABLET ORAL at 06:11

## 2017-03-25 RX ADMIN — BIMATOPROST 1 DROP: 0.1 SOLUTION/ DROPS OPHTHALMIC at 09:10

## 2017-03-25 RX ADMIN — ALLOPURINOL 300 MG: 300 TABLET ORAL at 09:10

## 2017-03-25 RX ADMIN — POTASSIUM CHLORIDE 40 MEQ: 750 CAPSULE, EXTENDED RELEASE ORAL at 09:12

## 2017-03-25 RX ADMIN — CEFTRIAXONE 1 G: 1 INJECTION, POWDER, FOR SOLUTION INTRAMUSCULAR; INTRAVENOUS at 03:42

## 2017-03-25 NOTE — PLAN OF CARE
Problem: Patient Care Overview (Adult)  Goal: Plan of Care Review  Outcome: Ongoing (interventions implemented as appropriate)    03/25/17 0401   Coping/Psychosocial Response Interventions   Plan Of Care Reviewed With patient   Patient Care Overview   Progress improving   Outcome Evaluation   Outcome Summary/Follow up Plan BPs elevated; PRN BP meds offer relief; c/o headache; prn headache med offers relief; alert and oriented x 4; up x 1 assist       Goal: Adult Individualization and Mutuality  Outcome: Ongoing (interventions implemented as appropriate)  Goal: Discharge Needs Assessment  Outcome: Ongoing (interventions implemented as appropriate)    Problem: Confusion, Acute (Adult)  Goal: Cognitive/Functional Impairments Minimized  Outcome: Ongoing (interventions implemented as appropriate)  Goal: Safety  Outcome: Ongoing (interventions implemented as appropriate)    Problem: Stroke (Ischemic) (Adult)  Goal: Signs and Symptoms of Listed Potential Problems Will be Absent or Manageable (Stroke)  Outcome: Ongoing (interventions implemented as appropriate)

## 2017-03-25 NOTE — DISCHARGE SUMMARY
[facility] Medicine Services  DISCHARGE SUMMARY       Date of Admission: 3/23/2017  Date of Discharge:  3/25/2017  Primary Care Physician: Toi Negrete MD    Discharge Diagnoses:  Hospital Problem List     Altered mental status    UTI (urinary tract infection)   Hypertensioin  CKD III  Hypothyroidism    Procedures Performed: None    Pertinent Test Results:   Lab Results (all)     Procedure Component Value Units Date/Time    POC Glucose Fingerstick [[id number]]  (Abnormal) Collected:  03/23/17 2212    Specimen:  Blood Updated:  03/23/17 2226     Glucose 136 (H) mg/dL     CBC Auto Differential [[id number]]  (Abnormal) Collected:  03/23/17 2214    Specimen:  Blood Updated:  03/23/17 2242     WBC 8.87 10*3/mm3      RBC 3.74 (L) 10*6/mm3      Hemoglobin 11.5 (L) g/dL      Hematocrit 35.1 (L) %      MCV 93.9 fL      MCH 30.7 pg      MCHC 32.8 (L) g/dL      RDW 14.5 %      RDW-SD 49.6 fl      MPV 9.5 fL      Platelets 238 10*3/mm3      Neutrophil % 60.0 %      Lymphocyte % 23.6 %      Monocyte % 11.6 %      Eosinophil % 3.3 %      Basophil % 1.2 %      Immature Grans % 0.3 %      Neutrophils, Absolute 5.32 10*3/mm3      Lymphocytes, Absolute 2.09 10*3/mm3      Monocytes, Absolute 1.03 10*3/mm3      Eosinophils, Absolute 0.29 10*3/mm3      Basophils, Absolute 0.11 10*3/mm3      Immature Grans, Absolute 0.03 10*3/mm3     Comprehensive Metabolic Panel [[id number]]  (Abnormal) Collected:  03/23/17 2214    Specimen:  Blood Updated:  03/23/17 2253     Glucose 130 (H) mg/dL      BUN 30 (H) mg/dL      Creatinine 1.52 (H) mg/dL      Sodium 140 mmol/L      Potassium 3.7 mmol/L      Chloride 106 mmol/L      CO2 21.0 (L) mmol/L      Calcium 9.4 mg/dL      Total Protein 6.7 g/dL      Albumin 4.00 g/dL      ALT (SGPT) 16 U/L      AST (SGOT) 35 U/L      Alkaline Phosphatase 61 U/L      Total Bilirubin 0.3 mg/dL      eGFR Non African Amer 32 (L) mL/min/1.73      Globulin 2.7 gm/dL      A/G Ratio 1.5  g/dL      BUN/Creatinine Ratio 19.7     Anion Gap 13.0 mmol/L     Narrative:       The MDRD GFR formula is only valid for adults with stable renal function between ages 18 and 70.    Urinalysis With / Culture If Indicated [[id number]]  (Abnormal) Collected:  03/24/17 0011    Specimen:  Urine from Urine, Clean Catch Updated:  03/24/17 0052     Color, UA Yellow     Appearance, UA Cloudy (A)     pH, UA 6.0     Specific Gravity, UA 1.010     Glucose, UA Negative     Ketones, UA Negative     Bilirubin, UA Negative     Blood, UA Trace (A)     Protein, UA 30 mg/dL (1+) (A)     Leuk Esterase, UA Large (3+) (A)     Nitrite, UA Positive (A)     Urobilinogen, UA 0.2 E.U./dL    Urinalysis, Microscopic Only [[id number]]  (Abnormal) Collected:  03/24/17 0011    Specimen:  Urine from Urine, Clean Catch Updated:  03/24/17 0052     RBC, UA 3-5 (A) /HPF      WBC, UA 21-30 (A) /HPF      Bacteria, UA 3+ (A) /HPF      Squamous Epithelial Cells, UA 3-6 (A) /HPF      Hyaline Casts, UA None Seen /LPF      Methodology Automated Microscopy    CBC (No Diff) [[id number]]  (Abnormal) Collected:  03/24/17 0636    Specimen:  Blood Updated:  03/24/17 0655     WBC 8.30 10*3/mm3      RBC 4.07 (L) 10*6/mm3      Hemoglobin 12.3 g/dL      Hematocrit 38.3 %      MCV 94.1 fL      MCH 30.2 pg      MCHC 32.1 (L) g/dL      RDW 14.6 %      RDW-SD 49.6 fl      MPV 9.4 fL      Platelets 246 10*3/mm3     Basic Metabolic Panel [[id number]]  (Abnormal) Collected:  03/24/17 0636    Specimen:  Blood Updated:  03/24/17 0708     Glucose 96 mg/dL      BUN 25 (H) mg/dL      Creatinine 1.46 (H) mg/dL      Sodium 144 mmol/L      Potassium 3.6 mmol/L      Chloride 108 mmol/L      CO2 25.0 mmol/L      Calcium 9.5 mg/dL      eGFR Non African Amer 34 (L) mL/min/1.73      BUN/Creatinine Ratio 17.1     Anion Gap 11.0 mmol/L     Narrative:       The MDRD GFR formula is only valid for adults with stable renal function between ages 18 and 70.    CBC (No Diff) [[id number]]  (Abnormal)  Collected:  03/25/17 0547    Specimen:  Blood Updated:  03/25/17 0608     WBC 8.45 10*3/mm3      RBC 4.11 (L) 10*6/mm3      Hemoglobin 12.4 g/dL      Hematocrit 38.2 %      MCV 92.9 fL      MCH 30.2 pg      MCHC 32.5 (L) g/dL      RDW 14.6 %      RDW-SD 49.9 fl      MPV 9.5 fL      Platelets 227 10*3/mm3     Basic Metabolic Panel [[id number]]  (Abnormal) Collected:  03/25/17 0547    Specimen:  Blood Updated:  03/25/17 0619     Glucose 126 (H) mg/dL      BUN 21 mg/dL      Creatinine 1.26 mg/dL      Sodium 137 mmol/L      Potassium 3.4 (L) mmol/L      Chloride 103 mmol/L      CO2 22.0 (L) mmol/L      Calcium 9.3 mg/dL      eGFR Non African Amer 40 (L) mL/min/1.73      BUN/Creatinine Ratio 16.7     Anion Gap 12.0 mmol/L     Narrative:       The MDRD GFR formula is only valid for adults with stable renal function between ages 18 and 70.    TSH [[id number]]  (Normal) Collected:  03/25/17 0547    Specimen:  Blood Updated:  03/25/17 0650     TSH 1.100 mIU/mL     Urine Culture [[id number]]  (Abnormal) Collected:  03/24/17 0011    Specimen:  Urine from Urine, Clean Catch Updated:  03/25/17 0716     Urine Culture >100,000 CFU/mL Gram Negative Bacilli (A)        Imaging Results (all)     Procedure Component Value Units Date/Time    XR Chest 1 View [[id number]] Collected:  03/23/17 2202     Updated:  03/23/17 2227    Narrative:       EXAMINATION:   XR CHEST 1 VW-  3/23/2017 10:00 PM CDT     HISTORY: Short of breath.     A single view of the chest is obtained. The lungs are clear. The cardiac  silhouette is normal. Vascular calcification is present in the aortic  arch. Pleural surfaces are unremarkable.     This is compared to the prior study of 01/19/2016.       Impression:       Stable chest. There is no evidence of active disease.  This report was finalized on 03/23/2017 22:24 by Dr. Johnny Reyes MD.    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     TECHNIQUE: Serial axial tomographic images of the brain were obtained  without the use of intravenous contrast.      FINDINGS:   There is mild atrophy. Low-attenuation in the subcortical and  periventricular white matter. No mass effect or midline shift. No  abnormal extra-axial blood products. Surrounding soft tissue structures  and osseous structures demonstrate no acute findings. Soft tissue  abnormality over the vertex likely corresponds to the patient's known  history of squamous cell carcinoma status post excision.       Impression:       1. No definite acute intracranial process. There is atrophy and fairly  extensive white matter change, likely chronic microvascular ischemia. If  there is high clinical concern for acute ischemia, MRI could be  obtained.     A preliminary report was provided by [facility].   This report was finalized on 03/24/2017 08:12 by Dr. Hebert Sanders MD.    MRI Brain Without Contrast [[id number]] Collected:  03/24/17 1021     Updated:  03/24/17 1046    Narrative:       MRI BRAIN WITHOUT CONTRAST 3/24/2017 8:30 CST     HISTORY: ams     COMPARISON: CT brain from 03/23/2017       TECHNIQUE: Multiplanar imaging of the brain was performed in a routine  fashion.      FINDINGS:   Midline structures are nondisplaced. There is mild atrophy. There is no  significant mass effect or hydrocephalus. Basilar cisterns are  preserved. There are scattered foci of increased FLAIR signal intensity  throughout the periventricular white matter and to a lesser degree  scattered about the subcortical white matter. No  "abnormal extra axial  fluid collections are noted. No restriction of diffusion is present.      Proximal cervical spinal cord, brainstem, and cerebellum are  unremarkable. Normal cerebrovascular flow voids are seen. Bilateral  globes and orbits are normal in appearance.     No abnormal signal is noted in the mastoid air cells or paranasal  sinuses.        Impression:       Changes suggestive of atrophy and chronic microvascular ischemia without  evidence of acute intracranial process.        This report was finalized on 03/24/2017 10:43 by Dr. Hebert Sanders MD.        Consults: None    Chief Complaint on Day of Discharge: None     Hospital Course  Patient is a 89 y.o. female presented with altered mental status.  CT of the head and MRI of the head both negative.  Patient was found to have urinary tract infection.  She has received 2 doses of Rocephin and all symptoms of confusion have resolved.  She has no complaints today.  She is excited about going home.  I did discuss with her the need for potassium supplement prior to discharge with follow-up evaluation by her primary care provider.  She also verbalizes understanding of need to continue antibiotic therapy for 5 more days and again follow-up with primary care physician for repeat urinalysis.  Her stay has been uneventful and she is discharged home per private vehicle with family in stable condition.     Condition on Discharge:  Stable    Physical Exam on Discharge:  /74 (BP Location: Right arm, Patient Position: Lying)  Pulse 96  Temp 98.3 °F (36.8 °C) (Oral)   Resp 18  Ht 60\" (152.4 cm)  Wt 121 lb 12.8 oz (55.2 kg)  SpO2 99%  BMI 23.79 kg/m2     Physical Exam   Constitutional: She is oriented to person, place, and time. She appears well-developed and well-nourished. No distress.   HENT:   Head: Normocephalic and atraumatic.   Eyes: Conjunctivae and EOM are normal. Pupils are equal, round, and reactive to light. No scleral icterus.   Neck: Normal " range of motion. Neck supple. No JVD present. No tracheal deviation present.   Cardiovascular: Normal rate, regular rhythm, normal heart sounds and intact distal pulses.  Exam reveals no gallop.    No murmur heard.  Pulmonary/Chest: Effort normal and breath sounds normal. No respiratory distress. She has no wheezes. She has no rales.   Abdominal: Soft. Bowel sounds are normal. She exhibits no distension. There is no tenderness. There is no guarding.   Musculoskeletal: Normal range of motion. She exhibits no edema.   Neurological: She is alert and oriented to person, place, and time.   No obvious deficits noted.   Skin: Skin is warm and dry. No rash noted. She is not diaphoretic. No erythema. No pallor.   Psychiatric: She has a normal mood and affect. Her behavior is normal.   Vitals reviewed.      Discharge Disposition:  Home or Self Care    Discharge Medications:   Kamille Saul   Home Medication Instructions TONIA:[id number]    Printed on:03/25/17 4753   Medication Information                      allopurinol (ZYLOPRIM) 300 MG tablet  300 mg Daily.             amLODIPine (NORVASC) 5 MG tablet  5 mg 2 (Two) Times a Day.             cefdinir (OMNICEF) 300 MG capsule  Take 1 capsule by mouth 2 (Two) Times a Day for 5 days.             LUMIGAN 0.01 % ophthalmic drops  Administer 1 drop to the right eye 2 (Two) Times a Day.             niacin 500 MG tablet  Take 500 mg by mouth Daily. 2 capsules daily             quinapril (ACCUPRIL) 40 MG tablet  Take 40 mg by mouth Daily As Needed (SBP greater than 180).             SYNTHROID 50 MCG tablet  Take 50 mcg by mouth Daily.             timolol (TIMOPTIC) 0.5 % ophthalmic solution  Administer 1 drop to both eyes Daily.             topiramate (TOPAMAX) 25 MG tablet  Take 25 mg by mouth As Needed (headache).                 Discharge Diet:   Diet Instructions     Diet: Cardiac; Thin Liquids, No Restrictions       Discharge Diet:  Cardiac   Fluid Consistency:  Thin Liquids,  No Restrictions                 Discharge Care Plan / Instructions: Repeat urinalysis with culture if indicated and BMP 3/30/2017 - results to Dr. Toi Negrete    Activity at Discharge:   Activity Instructions     Activity as Tolerated                     Follow-up Appointments: Follow-up 1 week with Dr. Toi Negrete after obtaining urinalysis with culture if indicated and BMP for his evaluation    Test Results Pending at Discharge: None     Plan discussed with Dr. Catracho Araujo.     Time spent in face-to-face evaluation, chart review, planning and education 35 minutes.    Brandi Salazar, CLARK  03/25/17  7:56 AM

## 2017-03-26 LAB — BACTERIA SPEC AEROBE CULT: ABNORMAL

## 2017-03-30 ENCOUNTER — LAB REQUISITION (OUTPATIENT)
Dept: LAB | Facility: HOSPITAL | Age: 82
End: 2017-03-30

## 2017-03-30 DIAGNOSIS — Z00.00 ENCOUNTER FOR GENERAL ADULT MEDICAL EXAMINATION WITHOUT ABNORMAL FINDINGS: ICD-10-CM

## 2017-03-30 PROCEDURE — 87086 URINE CULTURE/COLONY COUNT: CPT | Performed by: INTERNAL MEDICINE

## 2017-04-01 LAB
BACTERIA SPEC AEROBE CULT: ABNORMAL
BACTERIA SPEC AEROBE CULT: ABNORMAL

## 2017-05-03 ENCOUNTER — LAB REQUISITION (OUTPATIENT)
Dept: LAB | Facility: HOSPITAL | Age: 82
End: 2017-05-03

## 2017-05-03 DIAGNOSIS — Z00.00 ENCOUNTER FOR GENERAL ADULT MEDICAL EXAMINATION WITHOUT ABNORMAL FINDINGS: ICD-10-CM

## 2017-05-03 PROCEDURE — 87086 URINE CULTURE/COLONY COUNT: CPT | Performed by: INTERNAL MEDICINE

## 2017-05-06 LAB — BACTERIA SPEC AEROBE CULT: ABNORMAL

## 2017-10-20 DIAGNOSIS — E03.9 HYPOTHYROIDISM, UNSPECIFIED TYPE: ICD-10-CM

## 2017-10-20 DIAGNOSIS — E78.2 MIXED HYPERLIPIDEMIA: ICD-10-CM

## 2017-10-20 DIAGNOSIS — M10.9 ARTHRITIS DUE TO GOUT: ICD-10-CM

## 2017-10-20 DIAGNOSIS — I11.9 HYPERTENSIVE HEART DISEASE WITHOUT HEART FAILURE: ICD-10-CM

## 2017-10-20 DIAGNOSIS — N18.9 ANEMIA IN CHRONIC KIDNEY DISEASE, UNSPECIFIED CKD STAGE: ICD-10-CM

## 2017-10-20 DIAGNOSIS — D63.1 ANEMIA IN CHRONIC KIDNEY DISEASE, UNSPECIFIED CKD STAGE: ICD-10-CM

## 2017-10-20 DIAGNOSIS — N18.9 CHRONIC KIDNEY DISEASE, UNSPECIFIED CKD STAGE: ICD-10-CM

## 2017-10-20 PROBLEM — M19.90 OSTEOARTHRITIS: Status: ACTIVE | Noted: 2017-10-20

## 2017-10-20 PROBLEM — D64.9 ANEMIA: Status: ACTIVE | Noted: 2017-10-20

## 2017-11-02 DIAGNOSIS — N18.9 ANEMIA IN CHRONIC KIDNEY DISEASE, UNSPECIFIED CKD STAGE: ICD-10-CM

## 2017-11-02 DIAGNOSIS — I11.9 HYPERTENSIVE HEART DISEASE WITHOUT HEART FAILURE: ICD-10-CM

## 2017-11-02 DIAGNOSIS — N18.9 CHRONIC KIDNEY DISEASE, UNSPECIFIED CKD STAGE: ICD-10-CM

## 2017-11-02 DIAGNOSIS — E78.2 MIXED HYPERLIPIDEMIA: ICD-10-CM

## 2017-11-02 DIAGNOSIS — E03.9 HYPOTHYROIDISM, UNSPECIFIED TYPE: ICD-10-CM

## 2017-11-02 DIAGNOSIS — M10.9 ARTHRITIS DUE TO GOUT: ICD-10-CM

## 2017-11-02 DIAGNOSIS — D63.1 ANEMIA IN CHRONIC KIDNEY DISEASE, UNSPECIFIED CKD STAGE: ICD-10-CM

## 2017-11-02 LAB
ALBUMIN SERPL-MCNC: 4.1 G/DL (ref 3.5–5.2)
ALP BLD-CCNC: 61 U/L (ref 35–104)
ALT SERPL-CCNC: 12 U/L (ref 5–33)
ANION GAP SERPL CALCULATED.3IONS-SCNC: 17 MMOL/L (ref 7–19)
AST SERPL-CCNC: 21 U/L (ref 5–32)
BILIRUB SERPL-MCNC: 0.3 MG/DL (ref 0.2–1.2)
BUN BLDV-MCNC: 22 MG/DL (ref 8–23)
CALCIUM SERPL-MCNC: 9.5 MG/DL (ref 8.8–10.2)
CHLORIDE BLD-SCNC: 106 MMOL/L (ref 98–111)
CO2: 22 MMOL/L (ref 22–29)
CREAT SERPL-MCNC: 1.5 MG/DL (ref 0.5–0.9)
GFR NON-AFRICAN AMERICAN: 33
GLUCOSE FASTING: 111 MG/DL (ref 74–109)
HCT VFR BLD CALC: 38.9 % (ref 37–47)
HEMOGLOBIN: 12.5 G/DL (ref 12–16)
MCH RBC QN AUTO: 32.9 PG (ref 27–31)
MCHC RBC AUTO-ENTMCNC: 32.1 G/DL (ref 33–37)
MCV RBC AUTO: 102.4 FL (ref 81–99)
PDW BLD-RTO: 14.2 % (ref 11.5–14.5)
PLATELET # BLD: 210 K/UL (ref 130–400)
PMV BLD AUTO: 9.4 FL (ref 9.4–12.3)
POTASSIUM SERPL-SCNC: 4.2 MMOL/L (ref 3.5–5)
RBC # BLD: 3.8 M/UL (ref 4.2–5.4)
SODIUM BLD-SCNC: 145 MMOL/L (ref 136–145)
TOTAL PROTEIN: 6.7 G/DL (ref 6.6–8.7)
TSH SERPL DL<=0.05 MIU/L-ACNC: 2.92 UIU/ML (ref 0.27–4.2)
WBC # BLD: 4.7 K/UL (ref 4.8–10.8)

## 2017-11-04 LAB — URINE CULTURE, ROUTINE: NORMAL

## 2018-05-02 RX ORDER — UBIDECARENONE 30 MG
CAPSULE ORAL
COMMUNITY
End: 2021-08-12 | Stop reason: ALTCHOICE

## 2018-05-02 RX ORDER — RIZATRIPTAN BENZOATE 10 MG/1
10 TABLET ORAL
COMMUNITY
End: 2018-05-08 | Stop reason: SDUPTHER

## 2018-05-02 RX ORDER — QUINAPRIL 40 MG/1
40 TABLET ORAL 2 TIMES DAILY
COMMUNITY
End: 2018-05-08 | Stop reason: SDUPTHER

## 2018-05-02 RX ORDER — CLONIDINE HYDROCHLORIDE 0.1 MG/1
0.1 TABLET ORAL
COMMUNITY
End: 2018-05-08 | Stop reason: SDUPTHER

## 2018-05-02 RX ORDER — ALLOPURINOL 300 MG/1
300 TABLET ORAL DAILY
COMMUNITY
End: 2018-05-08 | Stop reason: SDUPTHER

## 2018-05-02 RX ORDER — TOPIRAMATE 25 MG/1
25 TABLET ORAL 2 TIMES DAILY
COMMUNITY
End: 2018-05-04 | Stop reason: SDUPTHER

## 2018-05-02 RX ORDER — LEVOTHYROXINE SODIUM 0.05 MG/1
50 TABLET ORAL DAILY
COMMUNITY
End: 2018-05-08 | Stop reason: SDUPTHER

## 2018-05-02 RX ORDER — HYDROCORTISONE ACETATE 0.5 %
CREAM (GRAM) TOPICAL
COMMUNITY
End: 2021-08-12 | Stop reason: ALTCHOICE

## 2018-05-02 RX ORDER — CYCLOSPORINE 0.5 MG/ML
1 EMULSION OPHTHALMIC 2 TIMES DAILY
COMMUNITY

## 2018-05-02 RX ORDER — TIMOLOL MALEATE 5 MG/ML
1 SOLUTION/ DROPS OPHTHALMIC 2 TIMES DAILY
COMMUNITY
End: 2022-04-19 | Stop reason: ALTCHOICE

## 2018-05-02 RX ORDER — M-VIT,TX,IRON,MINS/CALC/FOLIC 27MG-0.4MG
1 TABLET ORAL DAILY
COMMUNITY

## 2018-05-02 RX ORDER — ASPIRIN 325 MG
325 TABLET ORAL DAILY
COMMUNITY
End: 2019-07-08

## 2018-05-02 RX ORDER — LANOLIN ALCOHOL/MO/W.PET/CERES
500 CREAM (GRAM) TOPICAL 2 TIMES DAILY
COMMUNITY
End: 2018-11-08 | Stop reason: SDUPTHER

## 2018-05-02 RX ORDER — COLCHICINE 0.6 MG/1
0.6 TABLET ORAL 2 TIMES DAILY PRN
COMMUNITY
End: 2019-07-08

## 2018-05-04 RX ORDER — TOPIRAMATE 25 MG/1
TABLET ORAL
Qty: 180 TABLET | Refills: 3 | Status: SHIPPED | OUTPATIENT
Start: 2018-05-04 | End: 2018-11-08

## 2018-05-08 ENCOUNTER — OFFICE VISIT (OUTPATIENT)
Dept: INTERNAL MEDICINE | Age: 83
End: 2018-05-08
Payer: MEDICARE

## 2018-05-08 VITALS
OXYGEN SATURATION: 93 % | DIASTOLIC BLOOD PRESSURE: 90 MMHG | BODY MASS INDEX: 22.97 KG/M2 | WEIGHT: 117 LBS | HEIGHT: 60 IN | HEART RATE: 65 BPM | SYSTOLIC BLOOD PRESSURE: 130 MMHG

## 2018-05-08 DIAGNOSIS — E03.9 HYPOTHYROIDISM, UNSPECIFIED TYPE: ICD-10-CM

## 2018-05-08 DIAGNOSIS — E03.9 HYPOTHYROIDISM, UNSPECIFIED TYPE: Primary | ICD-10-CM

## 2018-05-08 DIAGNOSIS — D63.1 ANEMIA IN CHRONIC KIDNEY DISEASE, UNSPECIFIED CKD STAGE: ICD-10-CM

## 2018-05-08 DIAGNOSIS — N18.9 CHRONIC KIDNEY DISEASE, UNSPECIFIED CKD STAGE: ICD-10-CM

## 2018-05-08 DIAGNOSIS — I11.9 HYPERTENSIVE HEART DISEASE WITHOUT HEART FAILURE: ICD-10-CM

## 2018-05-08 DIAGNOSIS — M10.9 ARTHRITIS DUE TO GOUT: ICD-10-CM

## 2018-05-08 DIAGNOSIS — N18.9 ANEMIA IN CHRONIC KIDNEY DISEASE, UNSPECIFIED CKD STAGE: ICD-10-CM

## 2018-05-08 PROBLEM — E78.2 MIXED HYPERLIPIDEMIA: Status: RESOLVED | Noted: 2017-10-20 | Resolved: 2018-05-08

## 2018-05-08 PROCEDURE — 99214 OFFICE O/P EST MOD 30 MIN: CPT | Performed by: INTERNAL MEDICINE

## 2018-05-08 RX ORDER — LEVOTHYROXINE SODIUM 0.05 MG/1
50 TABLET ORAL DAILY
Qty: 90 TABLET | Refills: 3 | Status: SHIPPED | OUTPATIENT
Start: 2018-05-08 | End: 2019-06-20 | Stop reason: SDUPTHER

## 2018-05-08 RX ORDER — QUINAPRIL 40 MG/1
40 TABLET ORAL 2 TIMES DAILY
Qty: 180 TABLET | Refills: 3 | Status: SHIPPED | OUTPATIENT
Start: 2018-05-08 | End: 2019-06-20 | Stop reason: SDUPTHER

## 2018-05-08 RX ORDER — ALLOPURINOL 300 MG/1
300 TABLET ORAL DAILY
Qty: 90 TABLET | Refills: 3 | Status: SHIPPED | OUTPATIENT
Start: 2018-05-08 | End: 2019-06-07 | Stop reason: SDUPTHER

## 2018-05-08 RX ORDER — RIZATRIPTAN BENZOATE 10 MG/1
10 TABLET ORAL
Qty: 90 TABLET | Refills: 3 | Status: SHIPPED | OUTPATIENT
Start: 2018-05-08 | End: 2019-07-08

## 2018-05-08 RX ORDER — COLCHICINE 0.6 MG/1
0.6 TABLET ORAL 2 TIMES DAILY PRN
Qty: 180 TABLET | Refills: 3 | Status: CANCELLED | OUTPATIENT
Start: 2018-05-08

## 2018-05-08 RX ORDER — CLONIDINE HYDROCHLORIDE 0.1 MG/1
0.1 TABLET ORAL 2 TIMES DAILY
Qty: 180 TABLET | Refills: 3 | Status: SHIPPED | OUTPATIENT
Start: 2018-05-08 | End: 2018-05-08 | Stop reason: SDUPTHER

## 2018-05-08 ASSESSMENT — ENCOUNTER SYMPTOMS
SHORTNESS OF BREATH: 0
TROUBLE SWALLOWING: 0
COUGH: 0
SORE THROAT: 0
NAUSEA: 0
ABDOMINAL PAIN: 0
RHINORRHEA: 0

## 2018-05-09 RX ORDER — CLONIDINE HYDROCHLORIDE 0.1 MG/1
TABLET ORAL
Qty: 90 TABLET | Refills: 3 | Status: SHIPPED | OUTPATIENT
Start: 2018-05-09 | End: 2021-08-12 | Stop reason: SDUPTHER

## 2018-11-05 DIAGNOSIS — E03.9 HYPOTHYROIDISM, UNSPECIFIED TYPE: ICD-10-CM

## 2018-11-05 DIAGNOSIS — I11.9 HYPERTENSIVE HEART DISEASE WITHOUT HEART FAILURE: ICD-10-CM

## 2018-11-05 DIAGNOSIS — N18.9 ANEMIA IN CHRONIC KIDNEY DISEASE, UNSPECIFIED CKD STAGE: ICD-10-CM

## 2018-11-05 DIAGNOSIS — D63.1 ANEMIA IN CHRONIC KIDNEY DISEASE, UNSPECIFIED CKD STAGE: ICD-10-CM

## 2018-11-05 DIAGNOSIS — N18.9 CHRONIC KIDNEY DISEASE, UNSPECIFIED CKD STAGE: ICD-10-CM

## 2018-11-05 DIAGNOSIS — M10.9 ARTHRITIS DUE TO GOUT: ICD-10-CM

## 2018-11-05 LAB
ALBUMIN SERPL-MCNC: 4.2 G/DL (ref 3.5–5.2)
ALP BLD-CCNC: 76 U/L (ref 35–104)
ALT SERPL-CCNC: 15 U/L (ref 5–33)
ANION GAP SERPL CALCULATED.3IONS-SCNC: 14 MMOL/L (ref 7–19)
AST SERPL-CCNC: 28 U/L (ref 5–32)
BACTERIA: NORMAL /HPF
BILIRUB SERPL-MCNC: 0.3 MG/DL (ref 0.2–1.2)
BILIRUBIN URINE: NEGATIVE
BLOOD, URINE: NEGATIVE
BUN BLDV-MCNC: 28 MG/DL (ref 8–23)
CALCIUM SERPL-MCNC: 10 MG/DL (ref 8.2–9.6)
CHLORIDE BLD-SCNC: 103 MMOL/L (ref 98–111)
CLARITY: CLEAR
CO2: 26 MMOL/L (ref 22–29)
COLOR: YELLOW
CREAT SERPL-MCNC: 1.3 MG/DL (ref 0.5–0.9)
EPITHELIAL CELLS, UA: 1 /HPF (ref 0–5)
GFR NON-AFRICAN AMERICAN: 38
GLUCOSE BLD-MCNC: 97 MG/DL (ref 74–109)
GLUCOSE URINE: NEGATIVE MG/DL
HCT VFR BLD CALC: 36.6 % (ref 37–47)
HEMOGLOBIN: 11.7 G/DL (ref 12–16)
HYALINE CASTS: 1 /HPF (ref 0–8)
KETONES, URINE: NEGATIVE MG/DL
LEUKOCYTE ESTERASE, URINE: NEGATIVE
MCH RBC QN AUTO: 31.9 PG (ref 27–31)
MCHC RBC AUTO-ENTMCNC: 32 G/DL (ref 33–37)
MCV RBC AUTO: 99.7 FL (ref 81–99)
NITRITE, URINE: NEGATIVE
PDW BLD-RTO: 13.7 % (ref 11.5–14.5)
PH UA: 6
PLATELET # BLD: 243 K/UL (ref 130–400)
PMV BLD AUTO: 9.6 FL (ref 9.4–12.3)
POTASSIUM SERPL-SCNC: 4.9 MMOL/L (ref 3.5–5)
PROTEIN UA: 30 MG/DL
RBC # BLD: 3.67 M/UL (ref 4.2–5.4)
RBC UA: 1 /HPF (ref 0–4)
SODIUM BLD-SCNC: 143 MMOL/L (ref 136–145)
SPECIFIC GRAVITY UA: 1.02
TOTAL PROTEIN: 6.7 G/DL (ref 6.6–8.7)
TSH SERPL DL<=0.05 MIU/L-ACNC: 2.85 UIU/ML (ref 0.27–4.2)
URINE REFLEX TO CULTURE: ABNORMAL
UROBILINOGEN, URINE: 0.2 E.U./DL
VITAMIN B-12: 1294 PG/ML (ref 211–946)
WBC # BLD: 6 K/UL (ref 4.8–10.8)
WBC UA: 3 /HPF (ref 0–5)

## 2018-11-08 ENCOUNTER — OFFICE VISIT (OUTPATIENT)
Dept: INTERNAL MEDICINE | Age: 83
End: 2018-11-08
Payer: MEDICARE

## 2018-11-08 VITALS
BODY MASS INDEX: 23.75 KG/M2 | OXYGEN SATURATION: 98 % | HEIGHT: 60 IN | SYSTOLIC BLOOD PRESSURE: 112 MMHG | DIASTOLIC BLOOD PRESSURE: 80 MMHG | HEART RATE: 72 BPM | WEIGHT: 121 LBS

## 2018-11-08 DIAGNOSIS — E03.9 HYPOTHYROIDISM, UNSPECIFIED TYPE: ICD-10-CM

## 2018-11-08 DIAGNOSIS — E78.2 MIXED HYPERLIPIDEMIA: Primary | ICD-10-CM

## 2018-11-08 DIAGNOSIS — D64.9 ANEMIA, UNSPECIFIED TYPE: ICD-10-CM

## 2018-11-08 DIAGNOSIS — N18.9 CHRONIC KIDNEY DISEASE, UNSPECIFIED CKD STAGE: ICD-10-CM

## 2018-11-08 DIAGNOSIS — N18.30 CHRONIC KIDNEY DISEASE, STAGE III (MODERATE) (HCC): ICD-10-CM

## 2018-11-08 PROCEDURE — G8427 DOCREV CUR MEDS BY ELIG CLIN: HCPCS | Performed by: INTERNAL MEDICINE

## 2018-11-08 PROCEDURE — 1101F PT FALLS ASSESS-DOCD LE1/YR: CPT | Performed by: INTERNAL MEDICINE

## 2018-11-08 PROCEDURE — 1123F ACP DISCUSS/DSCN MKR DOCD: CPT | Performed by: INTERNAL MEDICINE

## 2018-11-08 PROCEDURE — 1090F PRES/ABSN URINE INCON ASSESS: CPT | Performed by: INTERNAL MEDICINE

## 2018-11-08 PROCEDURE — 1036F TOBACCO NON-USER: CPT | Performed by: INTERNAL MEDICINE

## 2018-11-08 PROCEDURE — 99214 OFFICE O/P EST MOD 30 MIN: CPT | Performed by: INTERNAL MEDICINE

## 2018-11-08 PROCEDURE — G8484 FLU IMMUNIZE NO ADMIN: HCPCS | Performed by: INTERNAL MEDICINE

## 2018-11-08 PROCEDURE — G8420 CALC BMI NORM PARAMETERS: HCPCS | Performed by: INTERNAL MEDICINE

## 2018-11-08 PROCEDURE — 4040F PNEUMOC VAC/ADMIN/RCVD: CPT | Performed by: INTERNAL MEDICINE

## 2018-11-08 RX ORDER — LANOLIN ALCOHOL/MO/W.PET/CERES
500 CREAM (GRAM) TOPICAL 2 TIMES DAILY
Qty: 180 CAPSULE | Refills: 3 | Status: SHIPPED | OUTPATIENT
Start: 2018-11-08 | End: 2019-07-08

## 2018-11-08 ASSESSMENT — PATIENT HEALTH QUESTIONNAIRE - PHQ9
1. LITTLE INTEREST OR PLEASURE IN DOING THINGS: 0
SUM OF ALL RESPONSES TO PHQ QUESTIONS 1-9: 0
2. FEELING DOWN, DEPRESSED OR HOPELESS: 0
SUM OF ALL RESPONSES TO PHQ QUESTIONS 1-9: 0
SUM OF ALL RESPONSES TO PHQ9 QUESTIONS 1 & 2: 0

## 2018-11-08 NOTE — PROGRESS NOTES
Chief Complaint   Patient presents with    6 Month Follow-Up    Hypothyroidism    Chronic Kidney Disease    New Patient     Dr. Nicol Rodriguez transfer       HPI: Patient is a new patient to me she previously saw Dr. Nicol Rodriguez she is 80years old very independent and healthy she is here to follow up chronic kidney disease hypertension history of anemia hypothyroidism hyperlipidemia. She is 80 she lives alone she's very stable on her feet and ambulatory she says she usually walks her dog routinely. Her dog passed away hasn't been quite as active. She denies chest pressure or chest pain dyspnea or abdominal pain. 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     Chronic kidney disease     Eczema     Fibrocystic breast disease (FCBD)     Fibromyalgia     Hypertensive heart disease     Migraine     Mixed hyperlipidemia     Osteoarthritis     Skin cancer     Dr Ramesh Macdonald, rt ant tibial region, 9/07, 1/16 Dr Zaire Lira       Past Surgical History:   Procedure Laterality Date    APPENDECTOMY      COLONOSCOPY      HYSTERECTOMY         Family History   Problem Relation Age of Onset    Other Mother         thrombocytopenia    Other Father         skin cancer       Social History     Social History    Marital status:      Spouse name: N/A    Number of children: N/A    Years of education: N/A     Occupational History    Not on file.      Social History Main Topics    Smoking status: Former Smoker    Smokeless tobacco: Never Used    Alcohol use Not on file    Drug use: Unknown    Sexual activity: Not on file     Other Topics Concern    Not on file     Social History Narrative    No narrative on file       Allergies   Allergen Reactions    Biaxin [Clarithromycin]     Vioxx [Rofecoxib]        Current Outpatient Prescriptions   Medication Sig Dispense Refill    niacin 500 MG extended release capsule Take 1 capsule by mouth 2 times daily 180 capsule 3    cloNIDine (CATAPRES) 0.1 and sleep disturbance. The patient is not nervous/anxious. /80 (Site: Left Upper Arm)   Pulse 72   Ht 5' (1.524 m)   Wt 121 lb (54.9 kg)   SpO2 98%   BMI 23.63 kg/m²     Physical Examneck is supple and sclera anicteric. Heart s1,s2. Lungs cta bilaterally. Ext without cce. Mood is good. Skin without rashes.       Results for orders placed or performed in visit on 11/05/18   CBC   Result Value Ref Range    WBC 6.0 4.8 - 10.8 K/uL    RBC 3.67 (L) 4.20 - 5.40 M/uL    Hemoglobin 11.7 (L) 12.0 - 16.0 g/dL    Hematocrit 36.6 (L) 37.0 - 47.0 %    MCV 99.7 (H) 81.0 - 99.0 fL    MCH 31.9 (H) 27.0 - 31.0 pg    MCHC 32.0 (L) 33.0 - 37.0 g/dL    RDW 13.7 11.5 - 14.5 %    Platelets 213 167 - 851 K/uL    MPV 9.6 9.4 - 12.3 fL   Comprehensive Metabolic Panel   Result Value Ref Range    Sodium 143 136 - 145 mmol/L    Potassium 4.9 3.5 - 5.0 mmol/L    Chloride 103 98 - 111 mmol/L    CO2 26 22 - 29 mmol/L    Anion Gap 14 7 - 19 mmol/L    Glucose 97 74 - 109 mg/dL    BUN 28 (H) 8 - 23 mg/dL    CREATININE 1.3 (H) 0.5 - 0.9 mg/dL    GFR Non- 38 (A) >60    Calcium 10.0 (H) 8.2 - 9.6 mg/dL    Total Protein 6.7 6.6 - 8.7 g/dL    Alb 4.2 3.5 - 5.2 g/dL    Total Bilirubin 0.3 0.2 - 1.2 mg/dL    Alkaline Phosphatase 76 35 - 104 U/L    ALT 15 5 - 33 U/L    AST 28 5 - 32 U/L   TSH without Reflex   Result Value Ref Range    TSH 2.850 0.270 - 4.200 uIU/mL   Vitamin B12   Result Value Ref Range    Vitamin B-12 1294 (H) 211 - 946 pg/mL   Urinalysis Reflex to Culture   Result Value Ref Range    Color, UA YELLOW Straw/Yellow    Clarity, UA Clear Clear    Glucose, Ur Negative Negative mg/dL    Bilirubin Urine Negative Negative    Ketones, Urine Negative Negative mg/dL    Specific Gravity, UA 1.016 1.005 - 1.030    Blood, Urine Negative Negative    pH, UA 6.0 5.0 - 8.0    Protein, UA 30 (A) Negative mg/dL    Urobilinogen, Urine 0.2 <2.0 E.U./dL    Nitrite, Urine Negative Negative    Leukocyte Esterase, Urine

## 2018-11-23 PROBLEM — N18.30 CHRONIC KIDNEY DISEASE, STAGE III (MODERATE) (HCC): Status: ACTIVE | Noted: 2017-10-20

## 2018-11-23 ASSESSMENT — ENCOUNTER SYMPTOMS
SHORTNESS OF BREATH: 0
BACK PAIN: 0
SINUS PRESSURE: 0
ABDOMINAL PAIN: 0
ABDOMINAL DISTENTION: 0
EYE REDNESS: 0
COUGH: 0
EYE DISCHARGE: 0

## 2019-02-28 ENCOUNTER — OFFICE VISIT (OUTPATIENT)
Dept: INTERNAL MEDICINE | Age: 84
End: 2019-02-28
Payer: MEDICARE

## 2019-02-28 VITALS — DIASTOLIC BLOOD PRESSURE: 68 MMHG | TEMPERATURE: 97.9 F | HEART RATE: 78 BPM | SYSTOLIC BLOOD PRESSURE: 108 MMHG

## 2019-02-28 DIAGNOSIS — J06.9 UPPER RESPIRATORY TRACT INFECTION, UNSPECIFIED TYPE: ICD-10-CM

## 2019-02-28 DIAGNOSIS — R19.7 DIARRHEA OF PRESUMED INFECTIOUS ORIGIN: Primary | ICD-10-CM

## 2019-02-28 LAB
INFLUENZA A ANTIBODY: NORMAL
INFLUENZA B ANTIBODY: NORMAL

## 2019-02-28 PROCEDURE — G8420 CALC BMI NORM PARAMETERS: HCPCS | Performed by: NURSE PRACTITIONER

## 2019-02-28 PROCEDURE — 87804 INFLUENZA ASSAY W/OPTIC: CPT | Performed by: NURSE PRACTITIONER

## 2019-02-28 PROCEDURE — 1123F ACP DISCUSS/DSCN MKR DOCD: CPT | Performed by: NURSE PRACTITIONER

## 2019-02-28 PROCEDURE — 99214 OFFICE O/P EST MOD 30 MIN: CPT | Performed by: NURSE PRACTITIONER

## 2019-02-28 PROCEDURE — 1036F TOBACCO NON-USER: CPT | Performed by: NURSE PRACTITIONER

## 2019-02-28 PROCEDURE — G8484 FLU IMMUNIZE NO ADMIN: HCPCS | Performed by: NURSE PRACTITIONER

## 2019-02-28 PROCEDURE — G8427 DOCREV CUR MEDS BY ELIG CLIN: HCPCS | Performed by: NURSE PRACTITIONER

## 2019-02-28 PROCEDURE — 4040F PNEUMOC VAC/ADMIN/RCVD: CPT | Performed by: NURSE PRACTITIONER

## 2019-02-28 PROCEDURE — 1101F PT FALLS ASSESS-DOCD LE1/YR: CPT | Performed by: NURSE PRACTITIONER

## 2019-02-28 PROCEDURE — 1090F PRES/ABSN URINE INCON ASSESS: CPT | Performed by: NURSE PRACTITIONER

## 2019-02-28 RX ORDER — ONDANSETRON 4 MG/1
4 TABLET, ORALLY DISINTEGRATING ORAL EVERY 8 HOURS PRN
Qty: 30 TABLET | Refills: 0 | Status: SHIPPED | OUTPATIENT
Start: 2019-02-28 | End: 2019-07-08

## 2019-02-28 ASSESSMENT — ENCOUNTER SYMPTOMS
BACK PAIN: 0
DIARRHEA: 1
NAUSEA: 1
COUGH: 1
COLOR CHANGE: 0
VOICE CHANGE: 0
PHOTOPHOBIA: 0
VOMITING: 0
RHINORRHEA: 0
SHORTNESS OF BREATH: 0

## 2019-03-01 ENCOUNTER — APPOINTMENT (OUTPATIENT)
Dept: GENERAL RADIOLOGY | Facility: HOSPITAL | Age: 84
End: 2019-03-01

## 2019-03-01 ENCOUNTER — OFFICE VISIT (OUTPATIENT)
Dept: INTERNAL MEDICINE | Age: 84
End: 2019-03-01
Payer: MEDICARE

## 2019-03-01 ENCOUNTER — HOSPITAL ENCOUNTER (INPATIENT)
Facility: HOSPITAL | Age: 84
LOS: 4 days | Discharge: HOME HEALTH | End: 2019-03-05
Attending: FAMILY MEDICINE | Admitting: INTERNAL MEDICINE

## 2019-03-01 VITALS
SYSTOLIC BLOOD PRESSURE: 60 MMHG | HEART RATE: 96 BPM | HEIGHT: 60 IN | BODY MASS INDEX: 22.78 KG/M2 | WEIGHT: 116 LBS | DIASTOLIC BLOOD PRESSURE: 40 MMHG

## 2019-03-01 DIAGNOSIS — N17.9 ACUTE RENAL FAILURE, UNSPECIFIED ACUTE RENAL FAILURE TYPE (HCC): ICD-10-CM

## 2019-03-01 DIAGNOSIS — R19.7 DIARRHEA OF PRESUMED INFECTIOUS ORIGIN: Primary | ICD-10-CM

## 2019-03-01 DIAGNOSIS — N18.30 CHRONIC KIDNEY DISEASE, STAGE III (MODERATE) (HCC): ICD-10-CM

## 2019-03-01 DIAGNOSIS — R13.10 DYSPHAGIA, UNSPECIFIED TYPE: Primary | ICD-10-CM

## 2019-03-01 PROBLEM — E03.9 HYPOTHYROIDISM (ACQUIRED): Status: ACTIVE | Noted: 2019-03-01

## 2019-03-01 PROBLEM — J06.9 VIRAL UPPER RESPIRATORY TRACT INFECTION: Status: ACTIVE | Noted: 2019-03-01

## 2019-03-01 LAB
ALBUMIN SERPL-MCNC: 3.9 G/DL (ref 3.5–5)
ALBUMIN/GLOB SERPL: 1.5 G/DL (ref 1.1–2.5)
ALP SERPL-CCNC: 84 U/L (ref 24–120)
ALT SERPL W P-5'-P-CCNC: 37 U/L (ref 0–54)
ANION GAP SERPL CALCULATED.3IONS-SCNC: 14 MMOL/L (ref 4–13)
AST SERPL-CCNC: 51 U/L (ref 7–45)
BASOPHILS # BLD AUTO: 0.02 10*3/MM3 (ref 0–0.2)
BASOPHILS NFR BLD AUTO: 0.3 % (ref 0–2)
BILIRUB SERPL-MCNC: 0.4 MG/DL (ref 0.1–1)
BUN BLD-MCNC: 38 MG/DL (ref 5–21)
BUN/CREAT SERPL: 12.1 (ref 7–25)
CALCIUM SPEC-SCNC: 9.3 MG/DL (ref 8.4–10.4)
CHLORIDE SERPL-SCNC: 99 MMOL/L (ref 98–110)
CO2 SERPL-SCNC: 17 MMOL/L (ref 24–31)
CREAT BLD-MCNC: 3.14 MG/DL (ref 0.5–1.4)
DEPRECATED RDW RBC AUTO: 57.1 FL (ref 40–54)
EOSINOPHIL # BLD AUTO: 0.06 10*3/MM3 (ref 0–0.7)
EOSINOPHIL NFR BLD AUTO: 0.8 % (ref 0–4)
ERYTHROCYTE [DISTWIDTH] IN BLOOD BY AUTOMATED COUNT: 15.9 % (ref 12–15)
GFR SERPL CREATININE-BSD FRML MDRD: 14 ML/MIN/1.73
GFR SERPL CREATININE-BSD FRML MDRD: ABNORMAL ML/MIN/1.73
GLOBULIN UR ELPH-MCNC: 2.6 GM/DL
GLUCOSE BLD-MCNC: 99 MG/DL (ref 70–100)
HCT VFR BLD AUTO: 34.9 % (ref 37–47)
HGB BLD-MCNC: 11.6 G/DL (ref 12–16)
IMM GRANULOCYTES # BLD AUTO: 0.05 10*3/MM3 (ref 0–0.05)
IMM GRANULOCYTES NFR BLD AUTO: 0.6 % (ref 0–5)
LYMPHOCYTES # BLD AUTO: 1.45 10*3/MM3 (ref 0.72–4.86)
LYMPHOCYTES NFR BLD AUTO: 18.8 % (ref 15–45)
MCH RBC QN AUTO: 32 PG (ref 28–32)
MCHC RBC AUTO-ENTMCNC: 33.2 G/DL (ref 33–36)
MCV RBC AUTO: 96.1 FL (ref 82–98)
MONOCYTES # BLD AUTO: 0.75 10*3/MM3 (ref 0.19–1.3)
MONOCYTES NFR BLD AUTO: 9.7 % (ref 4–12)
NEUTROPHILS # BLD AUTO: 5.4 10*3/MM3 (ref 1.87–8.4)
NEUTROPHILS NFR BLD AUTO: 69.8 % (ref 39–78)
NRBC BLD AUTO-RTO: 0 /100 WBC (ref 0–0)
PLATELET # BLD AUTO: 177 10*3/MM3 (ref 130–400)
PMV BLD AUTO: 9.6 FL (ref 6–12)
POTASSIUM BLD-SCNC: 4.3 MMOL/L (ref 3.5–5.3)
PROT SERPL-MCNC: 6.5 G/DL (ref 6.3–8.7)
RBC # BLD AUTO: 3.63 10*6/MM3 (ref 4.2–5.4)
SODIUM BLD-SCNC: 130 MMOL/L (ref 135–145)
WBC NRBC COR # BLD: 7.73 10*3/MM3 (ref 4.8–10.8)

## 2019-03-01 PROCEDURE — 1036F TOBACCO NON-USER: CPT | Performed by: INTERNAL MEDICINE

## 2019-03-01 PROCEDURE — 94799 UNLISTED PULMONARY SVC/PX: CPT

## 2019-03-01 PROCEDURE — 99214 OFFICE O/P EST MOD 30 MIN: CPT | Performed by: INTERNAL MEDICINE

## 2019-03-01 PROCEDURE — 4040F PNEUMOC VAC/ADMIN/RCVD: CPT | Performed by: INTERNAL MEDICINE

## 2019-03-01 PROCEDURE — 87040 BLOOD CULTURE FOR BACTERIA: CPT | Performed by: NURSE PRACTITIONER

## 2019-03-01 PROCEDURE — G8484 FLU IMMUNIZE NO ADMIN: HCPCS | Performed by: INTERNAL MEDICINE

## 2019-03-01 PROCEDURE — 85025 COMPLETE CBC W/AUTO DIFF WBC: CPT | Performed by: NURSE PRACTITIONER

## 2019-03-01 PROCEDURE — 80053 COMPREHEN METABOLIC PANEL: CPT | Performed by: NURSE PRACTITIONER

## 2019-03-01 PROCEDURE — 94760 N-INVAS EAR/PLS OXIMETRY 1: CPT

## 2019-03-01 PROCEDURE — 1101F PT FALLS ASSESS-DOCD LE1/YR: CPT | Performed by: INTERNAL MEDICINE

## 2019-03-01 PROCEDURE — G8420 CALC BMI NORM PARAMETERS: HCPCS | Performed by: INTERNAL MEDICINE

## 2019-03-01 PROCEDURE — 1090F PRES/ABSN URINE INCON ASSESS: CPT | Performed by: INTERNAL MEDICINE

## 2019-03-01 PROCEDURE — G8427 DOCREV CUR MEDS BY ELIG CLIN: HCPCS | Performed by: INTERNAL MEDICINE

## 2019-03-01 PROCEDURE — 71046 X-RAY EXAM CHEST 2 VIEWS: CPT

## 2019-03-01 PROCEDURE — 1123F ACP DISCUSS/DSCN MKR DOCD: CPT | Performed by: INTERNAL MEDICINE

## 2019-03-01 PROCEDURE — 25010000002 ENOXAPARIN PER 10 MG: Performed by: NURSE PRACTITIONER

## 2019-03-01 RX ORDER — SODIUM CHLORIDE 9 MG/ML
75 INJECTION, SOLUTION INTRAVENOUS CONTINUOUS
Status: DISCONTINUED | OUTPATIENT
Start: 2019-03-01 | End: 2019-03-04

## 2019-03-01 RX ORDER — ALLOPURINOL 300 MG/1
300 TABLET ORAL DAILY
Status: DISCONTINUED | OUTPATIENT
Start: 2019-03-02 | End: 2019-03-05 | Stop reason: HOSPADM

## 2019-03-01 RX ORDER — LATANOPROST 50 UG/ML
1 SOLUTION/ DROPS OPHTHALMIC NIGHTLY
Status: DISCONTINUED | OUTPATIENT
Start: 2019-03-01 | End: 2019-03-05 | Stop reason: HOSPADM

## 2019-03-01 RX ORDER — ONDANSETRON 4 MG/1
4 TABLET, ORALLY DISINTEGRATING ORAL EVERY 6 HOURS PRN
Status: DISCONTINUED | OUTPATIENT
Start: 2019-03-01 | End: 2019-03-05 | Stop reason: HOSPADM

## 2019-03-01 RX ORDER — COLCHICINE 0.6 MG/1
0.6 TABLET ORAL 2 TIMES DAILY PRN
COMMUNITY
End: 2019-06-29 | Stop reason: HOSPADM

## 2019-03-01 RX ORDER — ACETAMINOPHEN 325 MG/1
650 TABLET ORAL EVERY 4 HOURS PRN
Status: DISCONTINUED | OUTPATIENT
Start: 2019-03-01 | End: 2019-03-05 | Stop reason: HOSPADM

## 2019-03-01 RX ORDER — CYCLOSPORINE 0.5 MG/ML
1 EMULSION OPHTHALMIC 2 TIMES DAILY
Status: DISCONTINUED | OUTPATIENT
Start: 2019-03-01 | End: 2019-03-05 | Stop reason: HOSPADM

## 2019-03-01 RX ORDER — ONDANSETRON 4 MG/1
4 TABLET, FILM COATED ORAL EVERY 6 HOURS PRN
Status: DISCONTINUED | OUTPATIENT
Start: 2019-03-01 | End: 2019-03-05 | Stop reason: HOSPADM

## 2019-03-01 RX ORDER — LEVOTHYROXINE SODIUM 0.05 MG/1
50 TABLET ORAL
Status: DISCONTINUED | OUTPATIENT
Start: 2019-03-02 | End: 2019-03-05 | Stop reason: HOSPADM

## 2019-03-01 RX ORDER — TIMOLOL MALEATE 5 MG/ML
1 SOLUTION/ DROPS OPHTHALMIC DAILY
Status: DISCONTINUED | OUTPATIENT
Start: 2019-03-01 | End: 2019-03-01

## 2019-03-01 RX ORDER — ONDANSETRON 4 MG/1
4 TABLET, FILM COATED ORAL EVERY 8 HOURS PRN
COMMUNITY

## 2019-03-01 RX ORDER — UBIDECARENONE 30 MG
1 CAPSULE ORAL DAILY
COMMUNITY

## 2019-03-01 RX ORDER — CYCLOSPORINE 0.5 MG/ML
1 EMULSION OPHTHALMIC 2 TIMES DAILY
COMMUNITY

## 2019-03-01 RX ORDER — ELECTROLYTES/DEXTROSE
1 SOLUTION, ORAL ORAL DAILY
COMMUNITY
End: 2019-06-29 | Stop reason: HOSPADM

## 2019-03-01 RX ORDER — RIZATRIPTAN BENZOATE 10 MG/1
10 TABLET ORAL ONCE AS NEEDED
Status: ON HOLD | COMMUNITY
End: 2019-06-29 | Stop reason: ALTCHOICE

## 2019-03-01 RX ORDER — SODIUM CHLORIDE 0.9 % (FLUSH) 0.9 %
3-10 SYRINGE (ML) INJECTION AS NEEDED
Status: DISCONTINUED | OUTPATIENT
Start: 2019-03-01 | End: 2019-03-05 | Stop reason: HOSPADM

## 2019-03-01 RX ORDER — CLONIDINE HYDROCHLORIDE 0.1 MG/1
0.1 TABLET ORAL 2 TIMES DAILY PRN
COMMUNITY
End: 2019-06-29 | Stop reason: HOSPADM

## 2019-03-01 RX ORDER — ONDANSETRON 2 MG/ML
4 INJECTION INTRAMUSCULAR; INTRAVENOUS EVERY 6 HOURS PRN
Status: DISCONTINUED | OUTPATIENT
Start: 2019-03-01 | End: 2019-03-05 | Stop reason: HOSPADM

## 2019-03-01 RX ORDER — ASPIRIN 325 MG
325 TABLET ORAL DAILY
Status: ON HOLD | COMMUNITY
End: 2019-06-29

## 2019-03-01 RX ORDER — SODIUM CHLORIDE 0.9 % (FLUSH) 0.9 %
3 SYRINGE (ML) INJECTION EVERY 12 HOURS SCHEDULED
Status: DISCONTINUED | OUTPATIENT
Start: 2019-03-01 | End: 2019-03-05 | Stop reason: HOSPADM

## 2019-03-01 RX ORDER — TIMOLOL MALEATE 5 MG/ML
1 SOLUTION/ DROPS OPHTHALMIC EVERY 12 HOURS SCHEDULED
Status: DISCONTINUED | OUTPATIENT
Start: 2019-03-02 | End: 2019-03-05 | Stop reason: HOSPADM

## 2019-03-01 RX ADMIN — ENOXAPARIN SODIUM 30 MG: 30 INJECTION SUBCUTANEOUS at 20:59

## 2019-03-01 RX ADMIN — CYCLOSPORINE 1 DROP: 0.5 EMULSION OPHTHALMIC at 20:59

## 2019-03-01 RX ADMIN — SODIUM CHLORIDE 125 ML/HR: 9 INJECTION, SOLUTION INTRAVENOUS at 19:40

## 2019-03-01 RX ADMIN — LATANOPROST 1 DROP: 50 SOLUTION OPHTHALMIC at 20:59

## 2019-03-01 ASSESSMENT — PATIENT HEALTH QUESTIONNAIRE - PHQ9
SUM OF ALL RESPONSES TO PHQ QUESTIONS 1-9: 0
2. FEELING DOWN, DEPRESSED OR HOPELESS: 0
1. LITTLE INTEREST OR PLEASURE IN DOING THINGS: 0
SUM OF ALL RESPONSES TO PHQ9 QUESTIONS 1 & 2: 0
SUM OF ALL RESPONSES TO PHQ QUESTIONS 1-9: 0

## 2019-03-01 NOTE — NURSING NOTE
Pt a direct admit from Dr. Shelley Thomas office to room 474.  Dr. Vanessa dumas and CLARK Fowler will put in orders.Cassy Whitaker RN

## 2019-03-01 NOTE — H&P
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  Patient states she was in her usual state of health until approximately 2 weeks ago at which time she developed cough with yellow/green sputum production subjective fevers, and diarrhea. She presented to her PCP yesterday, see labs below, and was instructed to seek ER evaluation today. Results revealed RYAN on CKD 3, baseline creatinine 1.5, UTI, negative flu A/B.  Patient is also reporting blurry vision over the past couple of days-new finding.  She denies chest pain.  She reports abdominal cramping lower quadrants.  Family at bedside.  Patient is admitted as inpatient for further evaluation and treatment.    Review of Systems   A 10 point review of systems was completed, all negative except for those discussed in HPI    Past Medical History:   Past Medical History:   Diagnosis Date   • Actinic keratosis of scalp    • Anemia    • Arthritis    • Chronic kidney disease, stage III (moderate) (CMS/HCC)    • Eczema    • Fibromyalgia    • Glaucoma     Right eye   • Hx of migraine headaches    • Hypertension    • Hypothyroidism    • Squamous cell carcinoma of scalp        Past Surgical History:   Past Surgical History:   Procedure Laterality Date   • APPENDECTOMY     • CATARACT EXTRACTION     • HYSTERECTOMY     • SKIN GRAFT     • SKIN LESION EXCISION      cyst removal    • TONSILLECTOMY         Family History: family history includes Skin cancer in her father; Thrombocytopenia in her mother.    Social History:  reports that she  "has quit smoking. She does not have any smokeless tobacco history on file. She reports that she drinks alcohol. She reports that she does not use drugs.    Code Status: Patient requests limited CPR no intubation, agreeable to CPR and defibrillation if needed if unable to speak for herself her daughters her granddaughter will speak for her      Allergies:  Allergies   Allergen Reactions   • Biaxin [Clarithromycin]        Medications:  Prior to Admission medications    Medication Sig Start Date End Date Taking? Authorizing Provider   allopurinol (ZYLOPRIM) 300 MG tablet 300 mg Daily. 12/11/16   Karen Harrington MD   amLODIPine (NORVASC) 5 MG tablet 5 mg 2 (Two) Times a Day. 11/10/16   Karen Harrington MD   LUMIGAN 0.01 % ophthalmic drops Administer 1 drop to the right eye 2 (Two) Times a Day. 12/31/16   Karen Harrington MD   niacin 500 MG tablet Take 500 mg by mouth Daily. 2 capsules daily    Karen Harrington MD   quinapril (ACCUPRIL) 40 MG tablet Take 40 mg by mouth Daily As Needed (SBP greater than 180). 10/27/16   Karen Harrington MD   SYNTHROID 50 MCG tablet Take 50 mcg by mouth Daily. 10/27/16   Karen Harrington MD   timolol (TIMOPTIC) 0.5 % ophthalmic solution Administer 1 drop to both eyes Daily. 10/18/16   Karen Harrington MD   topiramate (TOPAMAX) 25 MG tablet Take 25 mg by mouth As Needed (headache). 10/27/16   Karen Harrington MD       Objective     /64 (BP Location: Right arm, Patient Position: Lying)   Pulse 103   Temp 97.7 °F (36.5 °C) (Oral)   Resp 19   Ht 152.4 cm (60\")   Wt 53.1 kg (117 lb)   SpO2 93%   BMI 22.85 kg/m²   Physical Exam   Constitutional: She is oriented to person, place, and time. She appears well-developed and well-nourished. No distress.   HENT:   Head: Normocephalic and atraumatic.   Eyes: Conjunctivae and EOM are normal. Pupils are equal, round, and reactive to light. No scleral icterus.   Neck: Normal range of motion. Neck " supple. No JVD present. No tracheal deviation present.   Cardiovascular: Normal rate, regular rhythm, normal heart sounds and intact distal pulses. Exam reveals no gallop.   No murmur heard.  Pulmonary/Chest: Effort normal and breath sounds normal. No respiratory distress. She has no wheezes. She has no rales.   Abdominal: Soft. Bowel sounds are normal. She exhibits no distension. There is no tenderness. There is no guarding.   Musculoskeletal: Normal range of motion. She exhibits no edema.   Neurological: She is alert and oriented to person, place, and time.   Skin: Skin is warm and dry. No rash noted. She is not diaphoretic. No erythema. No pallor.   Psychiatric: She has a normal mood and affect. Her behavior is normal.   Vitals reviewed.      Pertinent Data: obtained at outlying facility 2/28/2019  Urinalysis was positive for leukocytes and blood, WBC 6-10, RBC 6-10, bacteria 3+, culture pending    WBC 9.1, hemoglobin 12.6, hematocrit 40.4, platelet count 176,000, .8    Sodium 134, potassium 4.8, chloride 99, CO2 15, BUN 27, creatinine 2.2, (baseline 1.5), anion gap 20, glucose 199, GFR 21    Flu a and B negative    I have personally reviewed and interpreted the radiology studies and ECG obtained at time of admission.     Assessment / Plan     Assessment:   Active Hospital Problems    Diagnosis   • **Acute renal failure superimposed on stage 3 chronic kidney disease (CMS/HCC)   • Diarrhea of presumed infectious origin   • Viral upper respiratory tract infection   • Hypothyroidism (acquired)   • UTI (urinary tract infection)       Plan:   1.  Admit as inpatient  2.  Hydrate with normal saline 125 mL/hour  3.  Asymptomatic bacturia - Hold antibiotics  4.  Home medications reviewed and restarted as appropriate  5.  DVT prophylaxis with SCDs and low-dose Lovenox  6.  PA and lateral chest x-ray today, CT of the abdomen with oral contrast today  7.  Additional labs respiratory culture and blood cultures, GI  panel  8.  Labs in a.m. BMP, CBC, A1c, lipid panel, TSH  9.  Supplemental O2, incentive spirometry, cardiac monitoring  10.  Poor venous access-will need midline  11.  We will need to follow urine culture results from [facility]    I discussed the patient's findings and my recommendations with: Afshin Mendez MD  Time spent 40 minutes      CLARK Alarcon  03/01/19   7:30 PM     I personally evaluated and examined the patient in conjunction with CLARK Lozano and agree with the assessment, treatment plan, and disposition of the patient as recorded by her. My history, exam, and further recommendations are:       Seen and examined.  RYAN due to severe diarrhea and poor PO intake.  GI panel pending.  Respiratory culture pending.  CXR unremarkable, personally reviewed.  CT scan tomorrow as cannot tolerate PO contrast as of now.      Per family, PCP collected stool and urine culture - Need to follow-up      Tyree Mendez MD  03/01/19  9:11 PM

## 2019-03-02 ENCOUNTER — APPOINTMENT (OUTPATIENT)
Dept: ULTRASOUND IMAGING | Facility: HOSPITAL | Age: 84
End: 2019-03-02

## 2019-03-02 ENCOUNTER — APPOINTMENT (OUTPATIENT)
Dept: CT IMAGING | Facility: HOSPITAL | Age: 84
End: 2019-03-02

## 2019-03-02 PROBLEM — N18.30 ACUTE RENAL FAILURE SUPERIMPOSED ON STAGE 3 CHRONIC KIDNEY DISEASE: Status: ACTIVE | Noted: 2019-03-02

## 2019-03-02 PROBLEM — N17.9 ACUTE RENAL FAILURE SUPERIMPOSED ON STAGE 3 CHRONIC KIDNEY DISEASE: Status: ACTIVE | Noted: 2019-03-02

## 2019-03-02 PROBLEM — N39.0 UTI (URINARY TRACT INFECTION): Status: RESOLVED | Noted: 2017-03-24 | Resolved: 2019-03-02

## 2019-03-02 PROBLEM — E87.1 HYPONATREMIA: Status: ACTIVE | Noted: 2019-03-02

## 2019-03-02 PROBLEM — R82.71 ASYMPTOMATIC BACTERIURIA: Status: ACTIVE | Noted: 2019-03-02

## 2019-03-02 LAB
ANION GAP SERPL CALCULATED.3IONS-SCNC: 14 MMOL/L (ref 4–13)
ARTICHOKE IGE QN: 51 MG/DL (ref 0–99)
BUN BLD-MCNC: 39 MG/DL (ref 5–21)
BUN/CREAT SERPL: 12.7 (ref 7–25)
CALCIUM SPEC-SCNC: 8.9 MG/DL (ref 8.4–10.4)
CHLORIDE SERPL-SCNC: 102 MMOL/L (ref 98–110)
CHOLEST SERPL-MCNC: 114 MG/DL (ref 130–200)
CO2 SERPL-SCNC: 14 MMOL/L (ref 24–31)
CREAT BLD-MCNC: 3.06 MG/DL (ref 0.5–1.4)
DEPRECATED RDW RBC AUTO: 53.8 FL (ref 40–54)
ERYTHROCYTE [DISTWIDTH] IN BLOOD BY AUTOMATED COUNT: 15.7 % (ref 12–15)
GFR SERPL CREATININE-BSD FRML MDRD: 14 ML/MIN/1.73
GFR SERPL CREATININE-BSD FRML MDRD: ABNORMAL ML/MIN/1.73
GLUCOSE BLD-MCNC: 99 MG/DL (ref 70–100)
HBA1C MFR BLD: 6.6 %
HCT VFR BLD AUTO: 32.8 % (ref 37–47)
HDLC SERPL-MCNC: 69 MG/DL
HGB BLD-MCNC: 11.4 G/DL (ref 12–16)
LDLC/HDLC SERPL: 0.36 {RATIO}
MCH RBC QN AUTO: 32.7 PG (ref 28–32)
MCHC RBC AUTO-ENTMCNC: 34.8 G/DL (ref 33–36)
MCV RBC AUTO: 94 FL (ref 82–98)
OSMOLALITY SERPL: 296 MOSM/KG (ref 289–308)
PLATELET # BLD AUTO: 177 10*3/MM3 (ref 130–400)
PMV BLD AUTO: 9.7 FL (ref 6–12)
POTASSIUM BLD-SCNC: 4.7 MMOL/L (ref 3.5–5.3)
RBC # BLD AUTO: 3.49 10*6/MM3 (ref 4.2–5.4)
SODIUM BLD-SCNC: 130 MMOL/L (ref 135–145)
TRIGL SERPL-MCNC: 102 MG/DL (ref 0–149)
TSH SERPL DL<=0.05 MIU/L-ACNC: 3.7 MIU/ML (ref 0.47–4.68)
WBC NRBC COR # BLD: 8.77 10*3/MM3 (ref 4.8–10.8)

## 2019-03-02 PROCEDURE — 92610 EVALUATE SWALLOWING FUNCTION: CPT | Performed by: SPEECH-LANGUAGE PATHOLOGIST

## 2019-03-02 PROCEDURE — 85027 COMPLETE CBC AUTOMATED: CPT | Performed by: NURSE PRACTITIONER

## 2019-03-02 PROCEDURE — 83036 HEMOGLOBIN GLYCOSYLATED A1C: CPT | Performed by: NURSE PRACTITIONER

## 2019-03-02 PROCEDURE — 94760 N-INVAS EAR/PLS OXIMETRY 1: CPT

## 2019-03-02 PROCEDURE — 83930 ASSAY OF BLOOD OSMOLALITY: CPT | Performed by: NURSE PRACTITIONER

## 2019-03-02 PROCEDURE — 94799 UNLISTED PULMONARY SVC/PX: CPT

## 2019-03-02 PROCEDURE — 80048 BASIC METABOLIC PNL TOTAL CA: CPT | Performed by: NURSE PRACTITIONER

## 2019-03-02 PROCEDURE — 84443 ASSAY THYROID STIM HORMONE: CPT | Performed by: NURSE PRACTITIONER

## 2019-03-02 PROCEDURE — 76775 US EXAM ABDO BACK WALL LIM: CPT

## 2019-03-02 PROCEDURE — 80061 LIPID PANEL: CPT | Performed by: NURSE PRACTITIONER

## 2019-03-02 PROCEDURE — 25010000002 ENOXAPARIN PER 10 MG: Performed by: NURSE PRACTITIONER

## 2019-03-02 PROCEDURE — 74176 CT ABD & PELVIS W/O CONTRAST: CPT

## 2019-03-02 RX ADMIN — TIMOLOL MALEATE 1 DROP: 5 SOLUTION/ DROPS OPHTHALMIC at 23:28

## 2019-03-02 RX ADMIN — ALLOPURINOL 300 MG: 300 TABLET ORAL at 10:07

## 2019-03-02 RX ADMIN — LEVOTHYROXINE SODIUM 50 MCG: 50 TABLET ORAL at 06:35

## 2019-03-02 RX ADMIN — SODIUM CHLORIDE 125 ML/HR: 9 INJECTION, SOLUTION INTRAVENOUS at 18:50

## 2019-03-02 RX ADMIN — ENOXAPARIN SODIUM 30 MG: 30 INJECTION SUBCUTANEOUS at 18:38

## 2019-03-02 RX ADMIN — LATANOPROST 1 DROP: 50 SOLUTION OPHTHALMIC at 21:09

## 2019-03-02 RX ADMIN — CYCLOSPORINE 1 DROP: 0.5 EMULSION OPHTHALMIC at 21:09

## 2019-03-02 NOTE — CONSULTS
Assessed veins for possible midline placement.  Veins too small at this time for midline.  #22 ga IV placed to RFA x1 stick using ultrasound guidance.

## 2019-03-02 NOTE — THERAPY EVALUATION
Acute Care - Speech Language Pathology   Swallow Initial Evaluation [facility]     Patient Name: Kamille Saul  : 3/22/1927  MRN: [id number]  Today's Date: 3/2/2019               Admit Date: 3/1/2019   bedside swallow evaluation completed. Pt is oriented x4 and able to follow directions without difficulties. Pt stated she has had a decrease in appetite over the past 10-12 days as well as c/o of difficulty initiating swallow at times. Pt reports coughing with meds this AM d/t reclined position in bed. Pt presented with full range of consistencies except mech soft. Pt noted to have prolonged oral phase with all trials and delay in swallow initiation. Slight chin tuck noted with all trials. Pt places hand in esophogeal region on chest with each swallow. Pt stated she feels as though bolus gets stuck at times. Pt reports this difficulty began approximately 10-12 days ago as well. Pt takes small bites and sips with each bolus presented. No overt s/s of aspiration noted throughout bedside eval. Dry vocal quality throughout. Current chest xray shows no acute abnormality. Pt okay to remain on current diet of regular solids and thin liquids. ST to follow up PRN to ensure diet toleration. MD to order more objective swallow study if continued concerns with swallow function. RN aware of poor appetite and to notify MD of possible appetite stimulant. ST to follow.   Keysha Bueno, CCC-SLP 3/2/2019 9:05 AM      Visit Dx:     ICD-10-CM ICD-9-CM   1. Dysphagia, unspecified type R13.10 787.20     Patient Active Problem List   Diagnosis   • Altered mental status   • UTI (urinary tract infection)   • Acute renal failure superimposed on stage 3 chronic kidney disease (CMS/HCC)   • Diarrhea of presumed infectious origin   • Viral upper respiratory tract infection   • Hypothyroidism (acquired)     Past Medical History:   Diagnosis Date   • Actinic keratosis of scalp    • Anemia    • Arthritis    • Chronic kidney disease,  stage III (moderate) (CMS/HCC)    • Eczema    • Fibromyalgia    • Glaucoma     Right eye   • Hx of migraine headaches    • Hypertension    • Hypothyroidism    • Squamous cell carcinoma of scalp      Past Surgical History:   Procedure Laterality Date   • APPENDECTOMY     • CATARACT EXTRACTION     • HYSTERECTOMY     • SKIN GRAFT     • SKIN LESION EXCISION      cyst removal    • TONSILLECTOMY          SWALLOW EVALUATION (last 72 hours)      SLP Adult Swallow Evaluation     Row Name 03/02/19 0738                   Rehab Evaluation    Document Type  evaluation  -BN        Subjective Information  no complaints  -BN        Patient Observations  alert;cooperative  -BN        Patient/Family Observations  no family present  -BN        Patient Effort  good  -BN           General Information    Patient Profile Reviewed  yes  -BN        Pertinent History Of Current Problem  acute renal failure. CXR: no acute abnormality. HX: skin CA, chronic anemia, chronic kidney disease stage 3, HTN  -BN        Current Method of Nutrition  regular textures;thin liquids  -BN        Precautions/Limitations, Vision  WFL;for purposes of eval  -BN        Precautions/Limitations, Hearing  WFL  -BN        Prior Level of Function-Communication  WFL  -BN        Prior Level of Function-Swallowing  no diet consistency restrictions  -BN        Plans/Goals Discussed with  patient  -BN        Barriers to Rehab  none identified  -BN        Patient's Goals for Discharge  patient did not state  -BN           Pain Assessment    Additional Documentation  Pain Scale: FACES Pre/Post-Treatment (Group)  -BN           Pain Scale: FACES Pre/Post-Treatment    Pain: FACES Scale, Pretreatment  0-->no hurt  -BN        Pain: FACES Scale, Post-Treatment  0-->no hurt  -BN           Oral Motor and Function    Dentition Assessment  natural, present and adequate  -BN        Secretion Management  WNL/WFL  -BN        Mucosal Quality  moist, healthy  -BN        Volitional Swallow   delayed  -BN           Oral Musculature and Cranial Nerve Assessment    Oral Motor General Assessment  WFL  -BN           General Eating/Swallowing Observations    Respiratory Support Currently in Use  room air  -BN        Eating/Swallowing Skills  self-fed;fed by SLP  -BN        Positioning During Eating  upright in bed  -BN        Utensils Used  spoon;straw  -BN        Consistencies Trialed  regular textures;pudding thick;honey-thick liquids;nectar/syrup-thick liquids;thin liquids  -BN           Clinical Swallow Eval    Oral Prep Phase  impaired  -BN        Oral Transit  impaired  -BN        Oral Residue  WFL  -BN        Pharyngeal Phase  no overt signs/symptoms of pharyngeal impairment  -BN        Clinical Swallow Evaluation Summary  ST bedside swallow evaluation completed. Pt is oriented x4 and able to follow directions without difficulties. Pt stated she has had a decrease in appetite over the past 10-12 days as well as c/o of difficulty initiating swallow at times. Pt reports coughing with meds this AM d/t reclined position in bed. Pt presented with full range of consistencies except mech soft. Pt noted to have prolonged oral phase with all trials and delay in swallow initiation. Slight chin tuck noted with all trials. Pt places hand in esophogeal region on chest with each swallow. Pt stated she feels as though bolus gets stuck at times. Pt reports this difficulty began approximately 10-12 days ago as well. Pt takes small bites and sips with each bolus presented. No overt s/s of aspiration noted throughout bedside eval. Dry vocal quality throughout. Current chest xray shows no acute abnormality. Pt okay to remain on current diet of regular solids and thin liquids. ST to follow up PRN to ensure diet toleration. MD to order more objective swallow study if continued concerns with swallow function. RN aware of poor appetite and to notify MD of possible appetite stimulant. ST to follow.   -BN           Oral Prep  Concerns    Oral Prep Concerns  prolonged mastication;increased prep time  -BN        Prolonged Mastication  thin;nectar;honey;pudding;regular consistencies  -BN        Increased Prep Time  thin;nectar;honey;pudding;regular consistencies  -BN           Oral Transit Concerns    Oral Transit Concerns  delayed initiation of bolus transit  -BN        Delayed Intiation of Bolus Transit  thin;nectar;honey;pudding;regular consistencies  -BN           Clinical Impression    SLP Swallowing Diagnosis  mild;pharyngeal dysfunction  -BN        Functional Impact  risk of aspiration/pneumonia;risk of malnutrition;risk of dehydration  -BN        Rehab Potential/Prognosis, Swallowing  good, to achieve stated therapy goals  -BN        Swallow Criteria for Skilled Therapeutic Interventions Met  demonstrates skilled criteria  -BN           Recommendations    Therapy Frequency (Swallow)  PRN  -BN        Predicted Duration Therapy Intervention (Days)  until discharge  -BN        SLP Diet Recommendation  regular textures;thin liquids  -BN        Recommended Precautions and Strategies  upright posture during/after eating;small bites of food and sips of liquid  -BN        SLP Rec. for Method of Medication Administration  meds whole;as tolerated  -BN        Monitor for Signs of Aspiration  yes;cough;notify SLP if any concerns;gurgly voice;throat clearing;pneumonia;right lower lobe infiltrates  -BN        Anticipated Dischage Disposition  unknown  -BN        Demonstrates Need for Referral to Another Service  clinical nutrition services/dietitian;other (see comments) poor appetite  -BN           Swallow Goals (SLP)    Oral Nutrition/Hydration Goal Selection (SLP)  oral nutrition/hydration, SLP goal 1  -BN           Oral Nutrition/Hydration Goal 1 (SLP)    Oral Nutrition/Hydration Goal 1, SLP  Pt will tolerate LRD without s/s of aspiration   -BN        Time Frame (Oral Nutrition/Hydration Goal 1, SLP)  short term goal (STG);by discharge  -BN         Barriers (Oral Nutrition/Hydration Goal 1, SLP)  n/a  -BN        Progress/Outcomes (Oral Nutrition/Hydration Goal 1, SLP)  goal ongoing  -BN          User Key  (r) = Recorded By, (t) = Taken By, (c) = Cosigned By    Initials Name Effective Dates    Keysha Huizar, CCC-SLP 04/03/18 -           EDUCATION  The patient has been educated in the following areas:   Dysphagia (Swallowing Impairment).    SLP Recommendation and Plan  SLP Swallowing Diagnosis: mild, pharyngeal dysfunction  SLP Diet Recommendation: regular textures, thin liquids  Recommended Precautions and Strategies: upright posture during/after eating, small bites of food and sips of liquid     Monitor for Signs of Aspiration: yes, cough, notify SLP if any concerns, gurgly voice, throat clearing, pneumonia, right lower lobe infiltrates     Swallow Criteria for Skilled Therapeutic Interventions Met: demonstrates skilled criteria  Anticipated Dischage Disposition: unknown  Rehab Potential/Prognosis, Swallowing: good, to achieve stated therapy goals  Therapy Frequency (Swallow): PRN  Predicted Duration Therapy Intervention (Days): until discharge  Demonstrates Need for Referral to Another Service: clinical nutrition services/dietitian, other (see comments)(poor appetite)    Plan of Care Reviewed With: patient  Plan of Care Review  Plan of Care Reviewed With: patient  Progress: no change  Outcome Summary: ST bedside swallow evaluation completed. Pt is oriented x4 and able to follow directions without difficulties. Pt stated she has had a decrease in appetite over the past 10-12 days as well as c/o of difficulty initiating swallow at times. Pt reports coughing with meds this AM d/t reclined position in bed. Pt presented with full range of consistencies except mech soft. Pt noted to have prolonged oral phase with all trials and delay in swallow initiation. Slight chin tuck noted with all trials. Pt places hand in esophogeal region on chest with each  swallow. Pt stated she feels as though bolus gets stuck at times. Pt reports this difficulty began approximately 10-12 days ago as well. Pt takes small bites and sips with each bolus presented. No overt s/s of aspiration noted throughout bedside eval. Dry vocal quality throughout. Current chest xray shows no acute abnormality. Pt okay to remain on current diet of regular solids and thin liquids. ST to follow up PRN to ensure diet toleration. MD to order more objective swallow study if continued concerns with swallow function. RN aware of poor appetite and to notify MD of possible appetite stimulant. ST to follow.     SLP GOALS     Row Name 03/02/19 0738             Oral Nutrition/Hydration Goal 1 (SLP)    Oral Nutrition/Hydration Goal 1, SLP  Pt will tolerate LRD without s/s of aspiration   -BN      Time Frame (Oral Nutrition/Hydration Goal 1, SLP)  short term goal (STG);by discharge  -BN      Barriers (Oral Nutrition/Hydration Goal 1, SLP)  n/a  -BN      Progress/Outcomes (Oral Nutrition/Hydration Goal 1, SLP)  goal ongoing  -BN        User Key  (r) = Recorded By, (t) = Taken By, (c) = Cosigned By    Initials Name Provider Type    Keysha Huizar CCC-SLP Speech and Language Pathologist             Time Calculation:   Time Calculation- SLP     Row Name 03/02/19 0904             Time Calculation- SLP    SLP Start Time  0738  -BN      SLP Stop Time  0832  -      SLP Time Calculation (min)  54 min  -      SLP Received On  03/02/19  -      SLP Goal Re-Cert Due Date  03/12/19  -        User Key  (r) = Recorded By, (t) = Taken By, (c) = Cosigned By    Initials Name Provider Type    Keysha Huizar CCC-SLP Speech and Language Pathologist          Therapy Charges for Today     Code Description Service Date Service Provider Modifiers Qty    [id number]  ST EVAL ORAL PHARYNG SWALLOW 4 3/2/2019 Keysha Bueno CCC-ARELI GN 1               VELVET Lopez  3/2/2019

## 2019-03-02 NOTE — PLAN OF CARE
Problem: Patient Care Overview  Goal: Plan of Care Review  Outcome: Ongoing (interventions implemented as appropriate)   03/02/19 0522   Coping/Psychosocial   Plan of Care Reviewed With patient   Plan of Care Review   Progress no change   OTHER   Outcome Summary IV found to be nonfunctioning this shift. Unable to obtain IV access despite multiple attempts. Vats team consult ordered for this a.m. No s/s distress noted. Call light within reach. Continue to monitor.     Goal: Individualization and Mutuality  Outcome: Ongoing (interventions implemented as appropriate)   03/02/19 0522   Individualization   Patient Specific Goals (Include Timeframe) Pt. goal to obtain IV access today.     Goal: Interprofessional Rounds/Family Conf  Outcome: Ongoing (interventions implemented as appropriate)   03/02/19 0522   Interdisciplinary Rounds/Family Conf   Participants nursing;patient       Problem: Renal Failure/Kidney Injury, Acute (Adult)  Goal: Signs and Symptoms of Listed Potential Problems Will be Absent, Minimized or Managed (Renal Failure/Kidney Injury, Acute)  Outcome: Ongoing (interventions implemented as appropriate)   03/02/19 0522   Goal/Outcome Evaluation   Problems Assessed (Acute Renal Failure/Kidney Injury) all   Problems Present (ARF/Kidney Injury) situational response

## 2019-03-02 NOTE — NURSING NOTE
To CT scan via w/c accompanied by transport.Cassy Whitaker RN      09:20 AM: Returned to room.Cassy Whitaker RN

## 2019-03-02 NOTE — NURSING NOTE
Patient to CT for abdomen via wheelchair, accompanied by transport.Cassy Whitaker RN      09:20 AM:  Returned to room.Cassy Whitaker RN

## 2019-03-02 NOTE — PLAN OF CARE
Problem: Patient Care Overview  Goal: Plan of Care Review  Outcome: Ongoing (interventions implemented as appropriate)   03/01/19 1851   Coping/Psychosocial   Plan of Care Reviewed With patient   Plan of Care Review   Progress no change   OTHER   Outcome Summary VSS, patient admitted with dehydration, patient is to have CXR and CT abdomen without contrast. No s/s of distress or discomfort noted. Will continue to monitor. Call light within reach.       Problem: Renal Failure/Kidney Injury, Acute (Adult)  Goal: Signs and Symptoms of Listed Potential Problems Will be Absent, Minimized or Managed (Renal Failure/Kidney Injury, Acute)  Outcome: Ongoing (interventions implemented as appropriate)   03/01/19 1851   Goal/Outcome Evaluation   Problems Assessed (Acute Renal Failure/Kidney Injury) all   Problems Present (ARF/Kidney Injury) situational response

## 2019-03-02 NOTE — PROGRESS NOTES
[facility] Medicine Services  INPATIENT PROGRESS NOTE    Length of Stay: 1  Date of Admission: 3/1/2019  Primary Care Physician: Shelley Tidwell MD    Subjective   Chief Complaint: loose stool for the past 2 weeks.  HPI   Lying in bed.  Reports for the past 2 weeks loose stool, yellow-green sputum production and subjective fever.  She had not seen her primary care provider.  Yesterday her daughter encouraged her to see Dr. Tidwell and lab work was drawn with creatinine 3.14.  She was advised to present to the ER for evaluation and treatment.  She denies dysuria.  She reported lower abdominal cramping that has resolved.  She reports diarrhea 2 weeks ago but stool is now semi-formed.  She denies chest pain, palpitations or shortness of breath.  She reports decreased oral intake over the past 2 weeks.    Review of Systems   Constitutional: Positive for activity change and fatigue. Negative for unexpected weight change.   HENT: Negative for congestion and trouble swallowing.    Eyes: Negative for photophobia.   Respiratory: Negative for cough, shortness of breath and wheezing.    Cardiovascular: Negative for chest pain, palpitations and leg swelling.   Gastrointestinal: Positive for diarrhea. Negative for blood in stool, constipation and vomiting.   Endocrine: Negative for cold intolerance, heat intolerance and polyuria.   Genitourinary: Negative for dysuria and urgency.   Musculoskeletal: Negative for gait problem.   Skin: Negative for wound.   Allergic/Immunologic: Negative for immunocompromised state.   Neurological: Positive for weakness.   Hematological: Negative for adenopathy. Does not bruise/bleed easily.   Psychiatric/Behavioral: Negative for agitation, behavioral problems and confusion.     All pertinent negatives and positives are as above. All other systems have been reviewed and are negative unless otherwise stated.     Objective    Temp:  [97.4 °F (36.3 °C)-98 °F (36.7  °C)] 98 °F (36.7 °C)  Heart Rate:  [100-111] 100  Resp:  [18-19] 18  BP: (109-137)/(45-64) 121/53  Physical Exam   Constitutional: She is oriented to person, place, and time. She appears well-developed and well-nourished.   HENT:   Head: Normocephalic and atraumatic.   Eyes: EOM are normal. Pupils are equal, round, and reactive to light.   Neck: Normal range of motion. Neck supple.   Cardiovascular: Normal rate, regular rhythm, normal heart sounds and intact distal pulses. Exam reveals no gallop and no friction rub.   No murmur heard.  Normal sinus 100 on telemetry   Pulmonary/Chest: Effort normal and breath sounds normal. She has no wheezes. She has no rales.   No oxygen and use   Abdominal: She exhibits no distension. There is no tenderness.   Genitourinary:   Genitourinary Comments: Voiding.   Musculoskeletal: Normal range of motion. She exhibits no edema.   Neurological: She is alert and oriented to person, place, and time.   Skin: Skin is warm and dry. No erythema.   Psychiatric: She has a normal mood and affect. Her behavior is normal. Judgment and thought content normal.     Results Review:  I have reviewed the labs, radiology results, and diagnostic studies.    Laboratory Data:   Results from last 7 days   Lab Units 03/02/19  0558 03/01/19  1812   WBC 10*3/mm3 8.77 7.73   HEMOGLOBIN g/dL 11.4* 11.6*   HEMATOCRIT % 32.8* 34.9*   PLATELETS 10*3/mm3 177 177        Results from last 7 days   Lab Units 03/02/19  0558 03/01/19  1812   SODIUM mmol/L 130* 130*   POTASSIUM mmol/L 4.7 4.3   CHLORIDE mmol/L 102 99   CO2 mmol/L 14.0* 17.0*   BUN mg/dL 39* 38*   CREATININE mg/dL 3.06* 3.14*   CALCIUM mg/dL 8.9 9.3   BILIRUBIN mg/dL  --  0.4   ALK PHOS U/L  --  84   ALT (SGPT) U/L  --  37   AST (SGOT) U/L  --  51*   GLUCOSE mg/dL 99 99     Blood Culture   Date Value Ref Range Status   03/01/2019 No growth at less than 24 hours  Preliminary   03/01/2019 No growth at less than 24 hours  Preliminary     Imaging Results (all)      Procedure Component Value Units Date/Time    CT Abdomen Pelvis Without Contrast [[id number]] Collected:  03/02/19 0929     Updated:  03/02/19 0934    Narrative:       EXAMINATION: CT ABDOMEN PELVIS WO CONTRAST-      3/2/2019 9:10 AM CST     HISTORY: Nausea, vomiting, diarrhea; R13.10-Dysphagia, unspecified     In order to have a CT radiation dose as low as reasonably achievable  Automated Exposure Control was utilized for adjustment of the mA and/or  KV according to patient size.     DLP in mGycm= 239.     Noncontrast abdomen/pelvis CT.     No comparison.     Normal heart size.  Clear lung bases.     Normal noncontrast appearance of the liver.  Large hiatal hernia.  The gallbladder is mildly contracted.  Normal pancreas and spleen.  Normal and symmetric adrenal glands and kidneys. Lower pole 1 cm right  renal cyst.  Extensive calcification of the aorta with no aneurysm.  There is no bowel dilation.  No appendicitis or diverticulitis.  No sign of colitis.     No pelvic mass or fluid collection.     High-grade degenerative disc and endplate changes greatest at L1-2,  L2-3, and L5-S1.     Summary:  1. No mass, fluid collection, or inflammatory process.        This report was finalized on 03/02/2019 09:31 by Dr. Afshin Luna MD.    XR Chest PA & Lateral [[id number]] Collected:  03/01/19 1829     Updated:  03/01/19 1833    Narrative:       EXAMINATION: XR CHEST PA AND LATERAL- 3/1/2019 6:29 PM CST     HISTORY: Upper respiratory infection.     REPORT: Comparison is made with chest x-ray 3/23/2017.     No pulmonary infiltrate or consolidation is identified. Interstitial  markings are somewhat coarse as before. No pneumothorax or effusion is  identified. Heart size is normal. There is ectasia of the thoracic  aorta. There is a moderate-sized hiatal hernia. No acute osseous  abnormalities identified.       Impression:       No acute cardiopulmonary abnormality.  This report was finalized on 03/01/2019 18:30 by Dr. Carranza  MD Yolanda.        Intake/Output    Intake/Output Summary (Last 24 hours) at 3/2/2019 1021  Last data filed at 3/2/2019 0443  Gross per 24 hour   Intake --   Output 200 ml   Net -200 ml       Scheduled Meds    allopurinol 300 mg Oral Daily   cycloSPORINE 1 drop Both Eyes BID   enoxaparin 30 mg Subcutaneous Q24H   latanoprost 1 drop Right Eye Nightly   levothyroxine 50 mcg Oral Q AM   sodium chloride 3 mL Intravenous Q12H   timolol 1 drop Both Eyes Q12H       I have reviewed the patient current medications.     Assessment/Plan     Active Hospital Problems    Diagnosis   • **Acute renal failure superimposed on stage 3 chronic kidney disease (CMS/HCC)   • Hyponatremia   • Asymptomatic bacteriuria   • Diarrhea of presumed infectious origin   • Viral upper respiratory tract infection   • Hypothyroidism (acquired)     Plan:  1.  Creatinine 3.14 on admission.  3.0 today.  2.  IV fluid hydration normal saline at 125 mL/h  3.  Renal ultrasound  4.  Urine sodium, urine osmolality, serum osmolality, urine creatinine  5.  Hold Accupril with elevated creatinine.  6.  A1c 6.6, TSH 3.7  7.  CT abdomen no mass, fluid or inflammatory process.  8.  Blood culture no growth less than 24 hours  9.  Urinalysis with in and out cath and culture if necessary.  Hold antibiotics for asymptomatic bacteriuria outside urinalysis.  10.  Home medications reviewed and appropriate medications resumed  11.  Lovenox renal dose for deep vein thrombosis prophylaxis  12.  Basic metabolic panel tomorrow to monitor renal status    Her daughter, Leanne is surrogate decision-maker  The above documentation resulted from a face-to-face encounter by me Cat RODAS, [facility].      Discharge Planning: I expect patient to be discharged to home in 2 days.    CLARK Jane   03/02/19   10:21 AM    I personally evaluated and examined the patient in conjunction with CLARK Lino and agree with the assessment, treatment plan, and disposition of  the patient as recorded by her. My history, exam, and further recommendations are:     Seen and examined at bedside.  E coli on urinary culture at Dr. Eng office, patient asymptomatic bacturia, no indication for antibiotics at this time.  If any sign of infection will start antibiotics.  Patient lost IV access so did not get much IVF hydration.  Cr still elevated.     Tyree Mendez MD  03/02/19  6:22 PM

## 2019-03-02 NOTE — NURSING NOTE
Pt.'s IV found last night to be non- functioning.  Unable to obtain IV access per multiple tries.  Consult placed for possible midline for today.

## 2019-03-02 NOTE — PLAN OF CARE
Problem: Patient Care Overview  Goal: Plan of Care Review  Outcome: Ongoing (interventions implemented as appropriate)   03/02/19 0904   Coping/Psychosocial   Plan of Care Reviewed With patient   Plan of Care Review   Progress no change   OTHER   Outcome Summary ST bedside swallow evaluation completed. Pt is oriented x4 and able to follow directions without difficulties. Pt stated she has had a decrease in appetite over the past 10-12 days as well as c/o of difficulty initiating swallow at times. Pt reports coughing with meds this AM d/t reclined position in bed. Pt presented with full range of consistencies except mech soft. Pt noted to have prolonged oral phase with all trials and delay in swallow initiation. Slight chin tuck noted with all trials. Pt places hand in esophogeal region on chest with each swallow. Pt stated she feels as though bolus gets stuck at times. Pt reports this difficulty began approximately 10-12 days ago as well. Pt takes small bites and sips with each bolus presented. No overt s/s of aspiration noted throughout bedside eval. Dry vocal quality throughout. Current chest xray shows no acute abnormality. Pt okay to remain on current diet of regular solids and thin liquids. ST to follow up PRN to ensure diet toleration. MD to order more objective swallow study if continued concerns with swallow function. RN aware of poor appetite and to notify MD of possible appetite stimulant. ST to follow.

## 2019-03-03 LAB
ALBUMIN SERPL-MCNC: 2.6 G/DL (ref 3.5–5)
ALBUMIN/GLOB SERPL: 1.3 G/DL (ref 1.1–2.5)
ALP SERPL-CCNC: 53 U/L (ref 24–120)
ALT SERPL W P-5'-P-CCNC: 36 U/L (ref 0–54)
ANION GAP SERPL CALCULATED.3IONS-SCNC: 6 MMOL/L (ref 4–13)
AST SERPL-CCNC: 43 U/L (ref 7–45)
BACTERIA UR QL AUTO: ABNORMAL /HPF
BILIRUB SERPL-MCNC: 0.3 MG/DL (ref 0.1–1)
BILIRUB UR QL STRIP: NEGATIVE
BUN BLD-MCNC: 28 MG/DL (ref 5–21)
BUN/CREAT SERPL: 12.5 (ref 7–25)
CALCIUM SPEC-SCNC: 7.8 MG/DL (ref 8.4–10.4)
CHLORIDE SERPL-SCNC: 108 MMOL/L (ref 98–110)
CLARITY UR: CLEAR
CO2 SERPL-SCNC: 18 MMOL/L (ref 24–31)
COLOR UR: YELLOW
CREAT BLD-MCNC: 2.24 MG/DL (ref 0.5–1.4)
GFR SERPL CREATININE-BSD FRML MDRD: 20 ML/MIN/1.73
GLOBULIN UR ELPH-MCNC: 2 GM/DL
GLUCOSE BLD-MCNC: 92 MG/DL (ref 70–100)
GLUCOSE UR STRIP-MCNC: NEGATIVE MG/DL
HGB UR QL STRIP.AUTO: ABNORMAL
HYALINE CASTS UR QL AUTO: ABNORMAL /LPF
KETONES UR QL STRIP: NEGATIVE
LEUKOCYTE ESTERASE UR QL STRIP.AUTO: ABNORMAL
NITRITE UR QL STRIP: NEGATIVE
PH UR STRIP.AUTO: 5.5 [PH] (ref 5–8)
POTASSIUM BLD-SCNC: 4.1 MMOL/L (ref 3.5–5.3)
PROT SERPL-MCNC: 4.6 G/DL (ref 6.3–8.7)
PROT UR QL STRIP: NEGATIVE
RBC # UR: ABNORMAL /HPF
REF LAB TEST METHOD: ABNORMAL
SODIUM BLD-SCNC: 132 MMOL/L (ref 135–145)
SP GR UR STRIP: 1.01 (ref 1–1.03)
SQUAMOUS #/AREA URNS HPF: ABNORMAL /HPF
UROBILINOGEN UR QL STRIP: ABNORMAL
WBC UR QL AUTO: ABNORMAL /HPF

## 2019-03-03 PROCEDURE — 81001 URINALYSIS AUTO W/SCOPE: CPT | Performed by: NURSE PRACTITIONER

## 2019-03-03 PROCEDURE — 80053 COMPREHEN METABOLIC PANEL: CPT | Performed by: NURSE PRACTITIONER

## 2019-03-03 PROCEDURE — 94799 UNLISTED PULMONARY SVC/PX: CPT

## 2019-03-03 PROCEDURE — 94760 N-INVAS EAR/PLS OXIMETRY 1: CPT

## 2019-03-03 PROCEDURE — 87086 URINE CULTURE/COLONY COUNT: CPT | Performed by: NURSE PRACTITIONER

## 2019-03-03 PROCEDURE — 25010000002 ENOXAPARIN PER 10 MG: Performed by: NURSE PRACTITIONER

## 2019-03-03 RX ORDER — GUAIFENESIN 600 MG/1
1200 TABLET, EXTENDED RELEASE ORAL EVERY 12 HOURS SCHEDULED
Status: DISCONTINUED | OUTPATIENT
Start: 2019-03-03 | End: 2019-03-05 | Stop reason: HOSPADM

## 2019-03-03 RX ORDER — PANTOPRAZOLE SODIUM 40 MG/1
40 TABLET, DELAYED RELEASE ORAL
Status: DISCONTINUED | OUTPATIENT
Start: 2019-03-03 | End: 2019-03-05 | Stop reason: HOSPADM

## 2019-03-03 RX ORDER — CEFDINIR 300 MG/1
300 CAPSULE ORAL EVERY 12 HOURS SCHEDULED
Status: DISCONTINUED | OUTPATIENT
Start: 2019-03-03 | End: 2019-03-05 | Stop reason: HOSPADM

## 2019-03-03 RX ADMIN — ACETAMINOPHEN 650 MG: 325 TABLET, FILM COATED ORAL at 10:29

## 2019-03-03 RX ADMIN — CEFDINIR 300 MG: 300 CAPSULE ORAL at 20:08

## 2019-03-03 RX ADMIN — TIMOLOL MALEATE 1 DROP: 5 SOLUTION/ DROPS OPHTHALMIC at 10:22

## 2019-03-03 RX ADMIN — ENOXAPARIN SODIUM 30 MG: 30 INJECTION SUBCUTANEOUS at 18:39

## 2019-03-03 RX ADMIN — CYCLOSPORINE 1 DROP: 0.5 EMULSION OPHTHALMIC at 10:26

## 2019-03-03 RX ADMIN — GUAIFENESIN 1200 MG: 600 TABLET, EXTENDED RELEASE ORAL at 20:10

## 2019-03-03 RX ADMIN — PANTOPRAZOLE SODIUM 40 MG: 40 TABLET, DELAYED RELEASE ORAL at 20:08

## 2019-03-03 RX ADMIN — LEVOTHYROXINE SODIUM 50 MCG: 50 TABLET ORAL at 06:21

## 2019-03-03 RX ADMIN — CYCLOSPORINE 1 DROP: 0.5 EMULSION OPHTHALMIC at 20:08

## 2019-03-03 RX ADMIN — SODIUM CHLORIDE 125 ML/HR: 9 INJECTION, SOLUTION INTRAVENOUS at 11:19

## 2019-03-03 RX ADMIN — SODIUM CHLORIDE 125 ML/HR: 9 INJECTION, SOLUTION INTRAVENOUS at 03:01

## 2019-03-03 RX ADMIN — SODIUM CHLORIDE 75 ML/HR: 9 INJECTION, SOLUTION INTRAVENOUS at 20:14

## 2019-03-03 RX ADMIN — TIMOLOL MALEATE 1 DROP: 5 SOLUTION/ DROPS OPHTHALMIC at 20:08

## 2019-03-03 RX ADMIN — ALLOPURINOL 300 MG: 300 TABLET ORAL at 10:21

## 2019-03-03 RX ADMIN — CEFDINIR 300 MG: 300 CAPSULE ORAL at 10:21

## 2019-03-03 RX ADMIN — LATANOPROST 1 DROP: 50 SOLUTION OPHTHALMIC at 20:07

## 2019-03-03 NOTE — PLAN OF CARE
Problem: Patient Care Overview  Goal: Plan of Care Review  Outcome: Ongoing (interventions implemented as appropriate)   03/02/19 1817   Coping/Psychosocial   Plan of Care Reviewed With patient   Plan of Care Review   Progress improving   OTHER   Outcome Summary No loose stools today. VSS. IV fluids infusing at 125 cc/hr without difficulty through a #20 right upper arm, no redness or swelling noted. Had CT of abdomen today. No s/s of distress or discomfort noted. Will continue to monitor. Call light within reach.       Problem: Renal Failure/Kidney Injury, Acute (Adult)  Goal: Signs and Symptoms of Listed Potential Problems Will be Absent, Minimized or Managed (Renal Failure/Kidney Injury, Acute)  Outcome: Ongoing (interventions implemented as appropriate)   03/02/19 1817   Goal/Outcome Evaluation   Problems Assessed (Acute Renal Failure/Kidney Injury) all   Problems Present (ARF/Kidney Injury) situational response

## 2019-03-03 NOTE — NURSING NOTE
Patient ambulated in [place] with her daughter. No s/s of distress noted.  Will continue to monitor.  Call light within reach.Cassy Whitaker RN

## 2019-03-03 NOTE — PLAN OF CARE
Problem: Patient Care Overview  Goal: Plan of Care Review  Outcome: Ongoing (interventions implemented as appropriate)   03/03/19 0354   Coping/Psychosocial   Plan of Care Reviewed With patient   Plan of Care Review   Progress no change   OTHER   Outcome Summary IVF's continue. Pt. c/o feeling of abdominal fullness and belching. Hyperactive bowel sounds noted. No s/s distress noted. Safety maintained. Continue to monitor.     Goal: Individualization and Mutuality  Outcome: Ongoing (interventions implemented as appropriate)    Goal: Interprofessional Rounds/Family Conf  Outcome: Ongoing (interventions implemented as appropriate)   03/03/19 0354   Interdisciplinary Rounds/Family Conf   Participants nursing;patient       Problem: Renal Failure/Kidney Injury, Acute (Adult)  Goal: Signs and Symptoms of Listed Potential Problems Will be Absent, Minimized or Managed (Renal Failure/Kidney Injury, Acute)  Outcome: Ongoing (interventions implemented as appropriate)   03/03/19 0354   Goal/Outcome Evaluation   Problems Assessed (Acute Renal Failure/Kidney Injury) all   Problems Present (ARF/Kidney Injury) fluid imbalance;situational response

## 2019-03-03 NOTE — PROGRESS NOTES
[facility] Medicine Services  INPATIENT PROGRESS NOTE    Length of Stay: 2  Date of Admission: 3/1/2019  Primary Care Physician: Shelley Tidwell MD    Subjective   Chief Complaint: throat full, no loose stool today  HPI   Lying in bed.  Daughter in room.  She does not feel well today.  She said her throat feels tight.  She denies chest pain.  She denies shortness of breath.  No further loose stools since yesterday.  She denies abdominal pain.  No abdominal cramping.  Has ambulated to the bathroom.  Reports decreased appetite.    Review of Systems   Constitutional: Positive for activity change and fatigue. Negative for unexpected weight change.   HENT: Negative for congestion and trouble swallowing.    Eyes: Negative for photophobia.   Respiratory: Negative for cough, shortness of breath and wheezing.    Cardiovascular: Negative for chest pain, palpitations and leg swelling.   Gastrointestinal: Positive for diarrhea. Negative for blood in stool, constipation and vomiting.   Endocrine: Negative for cold intolerance, heat intolerance and polyuria.   Genitourinary: Negative for dysuria and urgency.   Musculoskeletal: Negative for gait problem.   Skin: Negative for wound.   Allergic/Immunologic: Negative for immunocompromised state.   Neurological: Positive for weakness.   Hematological: Negative for adenopathy. Does not bruise/bleed easily.   Psychiatric/Behavioral: Negative for agitation, behavioral problems and confusion.    All pertinent negatives and positives are as above. All other systems have been reviewed and are negative unless otherwise stated.     Objective    Temp:  [98 °F (36.7 °C)-98.2 °F (36.8 °C)] 98.1 °F (36.7 °C)  Heart Rate:  [] 87  Resp:  [16-18] 18  BP: (124-149)/(52-96) 149/74  Physical Exam  Constitutional: She is oriented to person, place, and time. She appears well-developed and well-nourished.   HENT:   Head: Normocephalic and atraumatic.   Eyes: EOM  are normal. Pupils are equal, round, and reactive to light.   Neck: Normal range of motion. Neck supple.   Cardiovascular: Normal rate, regular rhythm, normal heart sounds and intact distal pulses. Exam reveals no gallop and no friction rub.   No murmur heard.  Normal sinus rhythm  on telemetry   Pulmonary/Chest: Effort normal and breath sounds normal. She has no wheezes. She has no rales.   No oxygen and use   Abdominal: She exhibits no distension. There is no tenderness.   Genitourinary:   Genitourinary Comments: Voiding.   Musculoskeletal: Normal range of motion. She exhibits no edema.   Neurological: She is alert and oriented to person, place, and time.   Skin: Skin is warm and dry. No erythema.   Psychiatric: She has a normal mood and affect. Her behavior is normal. Judgment and thought content normal.     Results Review:  I have reviewed the labs, radiology results, and diagnostic studies.    Laboratory Data:   Results from last 7 days   Lab Units 03/02/19  0558 03/01/19  1812   WBC 10*3/mm3 8.77 7.73   HEMOGLOBIN g/dL 11.4* 11.6*   HEMATOCRIT % 32.8* 34.9*   PLATELETS 10*3/mm3 177 177        Results from last 7 days   Lab Units 03/03/19  0540 03/02/19  0558 03/01/19  1812   SODIUM mmol/L 132* 130* 130*   POTASSIUM mmol/L 4.1 4.7 4.3   CHLORIDE mmol/L 108 102 99   CO2 mmol/L 18.0* 14.0* 17.0*   BUN mg/dL 28* 39* 38*   CREATININE mg/dL 2.24* 3.06* 3.14*   CALCIUM mg/dL 7.8* 8.9 9.3   BILIRUBIN mg/dL 0.3  --  0.4   ALK PHOS U/L 53  --  84   ALT (SGPT) U/L 36  --  37   AST (SGOT) U/L 43  --  51*   GLUCOSE mg/dL 92 99 99     Blood Culture   Date Value Ref Range Status   03/01/2019 No growth at 24 hours  Preliminary   03/01/2019 No growth at 24 hours  Preliminary     Imaging Results (all)     Procedure Component Value Units Date/Time    US Renal Bilateral [[id number]] Collected:  03/02/19 1151     Updated:  03/02/19 1155    Narrative:       EXAMINATION: US RENAL BILATERAL-     3/2/2019 11:18 AM CST      HISTORY: acute renal failure; R13.10-Dysphagia, unspecified.     Grayscale and color flow ultrasound evaluation of both kidneys.     Small bilateral renal cysts.  The largest cyst is on the right and measures 1.4 cm.  No hydronephrosis.     Mildly echogenic renal parenchyma compatible with chronic renal disease.     Symmetric kidneys.  Right kidney = 8.3 x 4.2 x 4.8 cm.  Left kidney = 8.4 x 4.5 x 4.5 cm.     Summary:  1. Echogenic renal parenchyma with no sign of obstruction.  This report was finalized on 03/02/2019 11:52 by Dr. Afshin Luna MD.    CT Abdomen Pelvis Without Contrast [[id number]] Collected:  03/02/19 0929     Updated:  03/02/19 0934    Narrative:       EXAMINATION: CT ABDOMEN PELVIS WO CONTRAST-      3/2/2019 9:10 AM CST     HISTORY: Nausea, vomiting, diarrhea; R13.10-Dysphagia, unspecified     In order to have a CT radiation dose as low as reasonably achievable  Automated Exposure Control was utilized for adjustment of the mA and/or  KV according to patient size.     DLP in mGycm= 239.     Noncontrast abdomen/pelvis CT.     No comparison.     Normal heart size.  Clear lung bases.     Normal noncontrast appearance of the liver.  Large hiatal hernia.  The gallbladder is mildly contracted.  Normal pancreas and spleen.  Normal and symmetric adrenal glands and kidneys. Lower pole 1 cm right  renal cyst.  Extensive calcification of the aorta with no aneurysm.  There is no bowel dilation.  No appendicitis or diverticulitis.  No sign of colitis.     No pelvic mass or fluid collection.     High-grade degenerative disc and endplate changes greatest at L1-2,  L2-3, and L5-S1.     Summary:  1. No mass, fluid collection, or inflammatory process.   This report was finalized on 03/02/2019 09:31 by Dr. Afshin Luna MD.    XR Chest PA & Lateral [[id number]] Collected:  03/01/19 1829     Updated:  03/01/19 1833    Narrative:       EXAMINATION: XR CHEST PA AND LATERAL- 3/1/2019 6:29 PM CST     HISTORY: Upper  respiratory infection.     REPORT: Comparison is made with chest x-ray 3/23/2017.     No pulmonary infiltrate or consolidation is identified. Interstitial  markings are somewhat coarse as before. No pneumothorax or effusion is  identified. Heart size is normal. There is ectasia of the thoracic  aorta. There is a moderate-sized hiatal hernia. No acute osseous  abnormalities identified.       Impression:       No acute cardiopulmonary abnormality.  This report was finalized on 03/01/2019 18:30 by Dr. Dillon Guerrero MD.        Intake/Output    Intake/Output Summary (Last 24 hours) at 3/3/2019 0942  Last data filed at 3/3/2019 0620  Gross per 24 hour   Intake 2091 ml   Output 1175 ml   Net 916 ml       Scheduled Meds    allopurinol 300 mg Oral Daily   cefdinir 300 mg Oral Q12H   cycloSPORINE 1 drop Both Eyes BID   enoxaparin 30 mg Subcutaneous Q24H   latanoprost 1 drop Right Eye Nightly   levothyroxine 50 mcg Oral Q AM   sodium chloride 3 mL Intravenous Q12H   timolol 1 drop Both Eyes Q12H       I have reviewed the patient current medications.     Assessment/Plan     Active Hospital Problems    Diagnosis   • **Acute renal failure superimposed on stage 3 chronic kidney disease (CMS/[facility])   • Hyponatremia   • Asymptomatic bacteriuria     E. Coli per culture 2/28/19      • Diarrhea of presumed infectious origin   • Viral upper respiratory tract infection   • Hypothyroidism (acquired)     Plan:  1.  Creatinine 3.1 on admission.  Down to 2.3 today.  2.  Continue IV fluids at 125 mL/h.  IV access lost for a few hours yesterday; did not get much IV hydration.  3.  Renal ultrasound no signs of obstruction  4.  Serum osmolality 296 normal.  Urine osmolality and urine sodium pending  5.  Hold Accupril with elevated creatinine  6.  Blood culture no growth  7.  Urine culture obtained [facility] E. Coli.  Omnicef 300 mg twice daily for 3 days  8.  GI panel not sent.  Last stool yesterday.  9.  Lovenox for deep vein thrombosis  prophylaxis.  Renal dose  10.  Basic metabolic panel tomorrow to monitor renal status  11.  Home medications reviewed and resumed if appropriate  12.  A1c 6.6, TSH 3.7  13.  CT scan of the abdomen no mass, fluid, or inflammatory process.  14.  Out of bed and ambulate    The above documentation resulted from a face-to-face encounter by me Cat RODAS, [facility].      Discharge Planning: I expect patient to be discharged to home in 1-2 days.    CLARK Jane   03/03/19   9:42 AM     I personally evaluated and examined the patient in conjunction with CLARK Lino and agree with the assessment, treatment plan, and disposition of the patient as recorded by her. My history, exam, and further recommendations are:     Seen and examined.  Good UOP, kidneys improving.  Nontender abdomen, lungs grossly clear.  If patients Cr continues to decline tomorrow will d/c IVF and transition to PO.  If < 1.7, will likely discharge with a BMP with her PCP in a 1 week period. BMs have stopped.    Tyree Mendez MD  03/03/19  9:43 PM

## 2019-03-04 LAB
ANION GAP SERPL CALCULATED.3IONS-SCNC: 7 MMOL/L (ref 4–13)
BUN BLD-MCNC: 16 MG/DL (ref 5–21)
BUN/CREAT SERPL: 11.8 (ref 7–25)
CALCIUM SPEC-SCNC: 7.7 MG/DL (ref 8.4–10.4)
CHLORIDE SERPL-SCNC: 109 MMOL/L (ref 98–110)
CO2 SERPL-SCNC: 20 MMOL/L (ref 24–31)
CREAT BLD-MCNC: 1.36 MG/DL (ref 0.5–1.4)
GFR SERPL CREATININE-BSD FRML MDRD: 36 ML/MIN/1.73
GLUCOSE BLD-MCNC: 86 MG/DL (ref 70–100)
POTASSIUM BLD-SCNC: 4.1 MMOL/L (ref 3.5–5.3)
SODIUM BLD-SCNC: 136 MMOL/L (ref 135–145)

## 2019-03-04 PROCEDURE — 94799 UNLISTED PULMONARY SVC/PX: CPT

## 2019-03-04 PROCEDURE — 92526 ORAL FUNCTION THERAPY: CPT

## 2019-03-04 PROCEDURE — 80048 BASIC METABOLIC PNL TOTAL CA: CPT | Performed by: NURSE PRACTITIONER

## 2019-03-04 PROCEDURE — 94760 N-INVAS EAR/PLS OXIMETRY 1: CPT

## 2019-03-04 PROCEDURE — 25010000002 ENOXAPARIN PER 10 MG: Performed by: NURSE PRACTITIONER

## 2019-03-04 RX ADMIN — CEFDINIR 300 MG: 300 CAPSULE ORAL at 21:49

## 2019-03-04 RX ADMIN — LIDOCAINE HYDROCHLORIDE 30 ML: 20 SOLUTION ORAL; TOPICAL at 21:54

## 2019-03-04 RX ADMIN — PANTOPRAZOLE SODIUM 40 MG: 40 TABLET, DELAYED RELEASE ORAL at 05:25

## 2019-03-04 RX ADMIN — TIMOLOL MALEATE 1 DROP: 5 SOLUTION/ DROPS OPHTHALMIC at 21:49

## 2019-03-04 RX ADMIN — ALLOPURINOL 300 MG: 300 TABLET ORAL at 08:39

## 2019-03-04 RX ADMIN — GUAIFENESIN 1200 MG: 600 TABLET, EXTENDED RELEASE ORAL at 08:39

## 2019-03-04 RX ADMIN — SODIUM CHLORIDE, PRESERVATIVE FREE 3 ML: 5 INJECTION INTRAVENOUS at 08:39

## 2019-03-04 RX ADMIN — ENOXAPARIN SODIUM 30 MG: 30 INJECTION SUBCUTANEOUS at 18:47

## 2019-03-04 RX ADMIN — GUAIFENESIN 1200 MG: 600 TABLET, EXTENDED RELEASE ORAL at 21:49

## 2019-03-04 RX ADMIN — SODIUM CHLORIDE, PRESERVATIVE FREE 3 ML: 5 INJECTION INTRAVENOUS at 21:51

## 2019-03-04 RX ADMIN — CYCLOSPORINE 1 DROP: 0.5 EMULSION OPHTHALMIC at 21:51

## 2019-03-04 RX ADMIN — LATANOPROST 1 DROP: 50 SOLUTION OPHTHALMIC at 21:49

## 2019-03-04 RX ADMIN — CEFDINIR 300 MG: 300 CAPSULE ORAL at 08:39

## 2019-03-04 RX ADMIN — LEVOTHYROXINE SODIUM 50 MCG: 50 TABLET ORAL at 05:25

## 2019-03-04 RX ADMIN — CYCLOSPORINE 1 DROP: 0.5 EMULSION OPHTHALMIC at 08:39

## 2019-03-04 RX ADMIN — TIMOLOL MALEATE 1 DROP: 5 SOLUTION/ DROPS OPHTHALMIC at 08:39

## 2019-03-04 NOTE — THERAPY DISCHARGE NOTE
Acute Care - Speech Language Pathology   Swallow Treatment Note/Discharge    [place]     Patient Name: Kamille Saul  : 3/22/1927  MRN: [id number]  Today's Date: 3/4/2019               Admit Date: 3/1/2019     Swallowing tx completed this AM. Pt alert at time of ST arrival. Denied abdominal pain. Pt c/o hoarse vocal quality, which reportedly began last PM, as well as mild coughing, which was noted prior to PO trials. Pt was presented trials of regular solid diet consistency with thin liquids. Pt was noted to have adequate mastication of regular solid, without noted oral residue. Consumed thin liquid trials without observed concern. Pt was noted to have an inconsistent delayed cough, yet ST feels not likely related to aspiration at this time, as pt also agreed that swallow fx appears to be baseline. Spoke with RN who reported pt's lungs are coarse, yet sound consistent with mild congested that one might have at baseline of a morning. ST cannot fully r/o aspiration at this time, yet feel continued ST services are no longer warranted. MD to reconsult if changes or new concerns. Thanks! Yeimi Perry, CCC-SLP 3/4/2019 12:31 PM    Visit Dx:      ICD-10-CM ICD-9-CM   1. Dysphagia, unspecified type R13.10 787.20     Patient Active Problem List   Diagnosis   • Altered mental status   • Diarrhea of presumed infectious origin   • Viral upper respiratory tract infection   • Hypothyroidism (acquired)   • Hyponatremia   • Asymptomatic bacteriuria   • Acute renal failure superimposed on stage 3 chronic kidney disease (CMS/HCC)       Therapy Treatment  Rehabilitation Treatment Summary     Row Name 19 0950             Treatment Time/Intention    Discipline  speech language pathologist  -TM      Document Type  therapy note (daily note)  -TM      Subjective Information  complains of Hoarse vocal quality, reported this began last PM  -TM      Mode of Treatment  individual therapy;speech-language pathology  -TM       Patient/Family Observations  No family present at time of tx.  -TM      Care Plan Review  care plan/treatment goals reviewed;risks/benefits reviewed  -TM      Patient Effort  good  -TM      Recorded by [TM] Yeimi Perry CCC-SLP 03/04/19 1053      Row Name 03/04/19 0950             Pain Assessment    Additional Documentation  Pain Scale: Numbers Pre/Post-Treatment (Group)  -TM      Recorded by [TM] Yeimi Perry CCC-SLP 03/04/19 1053      Row Name 03/04/19 0950             Pain Scale: Numbers Pre/Post-Treatment    Pain Scale: Numbers, Pretreatment  0/10 - no pain  -TM      Pain Scale: Numbers, Post-Treatment  0/10 - no pain  -TM      Recorded by [TM] Yeimi Perry CCC-SLP 03/04/19 1053      Row Name 03/04/19 0950             Outcome Summary/Treatment Plan (SLP)    Daily Summary of Progress (SLP)  progress toward functional goals is good  -TM      Barriers to Overall Progress (SLP)  n/a  -TM      Plan for Continued Treatment (SLP)  Continue current diet of regular solid with thin liquids. MD to reconsult if changes or new concerns arise.  -TM      Anticipated Dischage Disposition  unknown  -TM      Recorded by [TM] Yeimi Perry CCC-SLP 03/04/19 1053        User Key  (r) = Recorded By, (t) = Taken By, (c) = Cosigned By    Initials Name Effective Dates Discipline    TM Yeimi Perry CCC-SLP 08/02/16 -  SLP        Outcome Summary  Outcome Summary/Treatment Plan (SLP)  Daily Summary of Progress (SLP): progress toward functional goals is good (03/04/19 0950 : Yiemi Perry CCC-SLP)  Barriers to Overall Progress (SLP): n/a (03/04/19 0950 : Yeimi Perry, CCC-SLP)  Plan for Continued Treatment (SLP): Continue current diet of regular solid with thin liquids. MD to reconsult if changes or new concerns arise. (03/04/19 0950 : Yeimi Perry CCC-SLP)  Anticipated Dischage Disposition: unknown (03/04/19 0950 : Yeimi Perry [facility])  SLP GOALS     Row Name 03/04/19 0950 03/02/19 0738          Oral  Nutrition/Hydration Goal 1 (SLP)    Oral Nutrition/Hydration Goal 1, SLP  Pt will tolerate LRD without s/s of aspiration   -TM  Pt will tolerate LRD without s/s of aspiration   -BN     Time Frame (Oral Nutrition/Hydration Goal 1, SLP)  short term goal (STG);by discharge  -TM  short term goal (STG);by discharge  -BN     Barriers (Oral Nutrition/Hydration Goal 1, SLP)  n/a  -TM  n/a  -BN     Progress/Outcomes (Oral Nutrition/Hydration Goal 1, SLP)  goal met  -TM  goal ongoing  -BN       User Key  (r) = Recorded By, (t) = Taken By, (c) = Cosigned By    Initials Name Provider Type     Yeimi Perry CCC-SLP Speech and Language Pathologist    Keysha Huizar CCC-SLP Speech and Language Pathologist          EDUCATION  The patient has been educated in the following areas:   Dysphagia (Swallowing Impairment).    SLP Recommendation and Plan                    Anticipated Dischage Disposition: unknown                Daily Summary of Progress (SLP): progress toward functional goals is good  Plan for Continued Treatment (SLP): Continue current diet of regular solid with thin liquids. MD to reconsult if changes or new concerns arise.  Plan of Care Reviewed With: patient, other (see comments)(Prisca BRYSON)  Progress: no change  Plan of Care Reviewed With: patient, other (see comments)(Prisca BRYSON)           Time Calculation:   Time Calculation- SLP     Row Name 03/04/19 1226             Time Calculation- SLP    SLP Start Time  0950  -      SLP Stop Time  1010  -      SLP Time Calculation (min)  20 min  -      SLP Received On  03/04/19  -        User Key  (r) = Recorded By, (t) = Taken By, (c) = Cosigned By    Initials Name Provider Type     Yeimi Perry CCC-SLP Speech and Language Pathologist          Therapy Charges for Today     Code Description Service Date Service Provider Modifiers Qty    [id number] HC ST TREATMENT SWALLOW 1 3/4/2019 Yeimi Perry CCC-SLP GN 1               SLP Discharge  Summary  Anticipated Dischage Disposition: unknown    Yeimi Perry CCC-SLP  3/4/2019

## 2019-03-04 NOTE — PROGRESS NOTES
[facility] Medicine Services  INPATIENT PROGRESS NOTE    Length of Stay: 3  Date of Admission: 3/1/2019  Primary Care Physician: Shelley Tidwell MD    Subjective   Chief Complaint: Feels weak today.  Tired.  Has not ambulated  HPI   Lying in bed.  Does not feel well today.  Denies chest pain or palpitations.  No bowel movement yet today.  No abdominal pain or cramping but feels weak and tired.  Appetite decreased.  Has not eaten much.  She does not feel well enough to go home today.  Discussed being out of bed and try to ambulate.    Review of Systems   Constitutional: Positive for activity change and fatigue. Negative for unexpected weight change.   HENT: Negative for congestion and trouble swallowing.    Eyes: Negative for photophobia.   Respiratory: Negative for cough, shortness of breath and wheezing.    Cardiovascular: Negative for chest pain, palpitations and leg swelling.   Gastrointestinal: Positive for diarrhea. Negative for blood in stool, constipation and vomiting.   Endocrine: Negative for cold intolerance, heat intolerance and polyuria.   Genitourinary: Negative for dysuria and urgency.   Musculoskeletal: Negative for gait problem.   Skin: Negative for wound.   Allergic/Immunologic: Negative for immunocompromised state.   Neurological: Positive for weakness.   Hematological: Negative for adenopathy. Does not bruise/bleed easily.   Psychiatric/Behavioral: Negative for agitation, behavioral problems and confusion.    All pertinent negatives and positives are as above. All other systems have been reviewed and are negative unless otherwise stated.     Objective    Temp:  [98 °F (36.7 °C)-98.3 °F (36.8 °C)] 98.2 °F (36.8 °C)  Heart Rate:  [66-94] 85  Resp:  [16-18] 18  BP: (118-156)/(54-83) 137/62  Physical Exam  Constitutional: She is oriented to person, place, and time. She appears well-developed and well-nourished.   HENT:   Head: Normocephalic and atraumatic.    Eyes: EOM are normal. Pupils are equal, round, and reactive to light.   Neck: Normal range of motion. Neck supple.   Cardiovascular: Normal rate, regular rhythm, normal heart sounds and intact distal pulses. Exam reveals no gallop and no friction rub.   No murmur heard.  Normal sinus rhythm  on telemetry   Pulmonary/Chest: Effort normal and breath sounds normal. She has no wheezes. She has no rales.   No oxygen and use   Abdominal: She exhibits no distension. There is no tenderness.   Genitourinary:   Genitourinary Comments: Voiding.   Musculoskeletal: Normal range of motion. She exhibits no edema.   Neurological: She is alert and oriented to person, place, and time.   Skin: Skin is warm and dry. No erythema.   Psychiatric: She has a normal mood and affect. Her behavior is normal. Judgment and thought content normal.     Results Review:  I have reviewed the labs, radiology results, and diagnostic studies.    Laboratory Data:   Results from last 7 days   Lab Units 03/02/19  0558 03/01/19  1812   WBC 10*3/mm3 8.77 7.73   HEMOGLOBIN g/dL 11.4* 11.6*   HEMATOCRIT % 32.8* 34.9*   PLATELETS 10*3/mm3 177 177        Results from last 7 days   Lab Units 03/04/19  0545 03/03/19  0540 03/02/19  0558 03/01/19  1812   SODIUM mmol/L 136 132* 130* 130*   POTASSIUM mmol/L 4.1 4.1 4.7 4.3   CHLORIDE mmol/L 109 108 102 99   CO2 mmol/L 20.0* 18.0* 14.0* 17.0*   BUN mg/dL 16 28* 39* 38*   CREATININE mg/dL 1.36 2.24* 3.06* 3.14*   CALCIUM mg/dL 7.7* 7.8* 8.9 9.3   BILIRUBIN mg/dL  --  0.3  --  0.4   ALK PHOS U/L  --  53  --  84   ALT (SGPT) U/L  --  36  --  37   AST (SGOT) U/L  --  43  --  51*   GLUCOSE mg/dL 86 92 99 99     Blood Culture   Date Value Ref Range Status   03/01/2019 No growth at 2 days  Preliminary   03/01/2019 No growth at 2 days  Preliminary     Urine Culture   Date Value Ref Range Status   03/03/2019 No growth at 24 hours  Preliminary     Imaging Results (all)     Procedure Component Value Units Date/Time    US  Renal Bilateral [[id number]] Collected:  03/02/19 1151     Updated:  03/02/19 1155    Narrative:       EXAMINATION: US RENAL BILATERAL-     3/2/2019 11:18 AM CST     HISTORY: acute renal failure; R13.10-Dysphagia, unspecified.     Grayscale and color flow ultrasound evaluation of both kidneys.     Small bilateral renal cysts.  The largest cyst is on the right and measures 1.4 cm.  No hydronephrosis.     Mildly echogenic renal parenchyma compatible with chronic renal disease.     Symmetric kidneys.  Right kidney = 8.3 x 4.2 x 4.8 cm.  Left kidney = 8.4 x 4.5 x 4.5 cm.     Summary:  1. Echogenic renal parenchyma with no sign of obstruction.  This report was finalized on 03/02/2019 11:52 by Dr. Afshin Luna MD.    CT Abdomen Pelvis Without Contrast [[id number]] Collected:  03/02/19 0929     Updated:  03/02/19 0934    Narrative:       EXAMINATION: CT ABDOMEN PELVIS WO CONTRAST-      3/2/2019 9:10 AM CST     HISTORY: Nausea, vomiting, diarrhea; R13.10-Dysphagia, unspecified     In order to have a CT radiation dose as low as reasonably achievable  Automated Exposure Control was utilized for adjustment of the mA and/or  KV according to patient size.     DLP in mGycm= 239.     Noncontrast abdomen/pelvis CT.     No comparison.     Normal heart size.  Clear lung bases.     Normal noncontrast appearance of the liver.  Large hiatal hernia.  The gallbladder is mildly contracted.  Normal pancreas and spleen.  Normal and symmetric adrenal glands and kidneys. Lower pole 1 cm right  renal cyst.  Extensive calcification of the aorta with no aneurysm.  There is no bowel dilation.  No appendicitis or diverticulitis.  No sign of colitis.     No pelvic mass or fluid collection.     High-grade degenerative disc and endplate changes greatest at L1-2,  L2-3, and L5-S1.     Summary:  1. No mass, fluid collection, or inflammatory process.   This report was finalized on 03/02/2019 09:31 by Dr. Afshin Luna MD.    XR Chest PA & Lateral  [[id number]] Collected:  03/01/19 1829     Updated:  03/01/19 1833    Narrative:       EXAMINATION: XR CHEST PA AND LATERAL- 3/1/2019 6:29 PM CST     HISTORY: Upper respiratory infection.     REPORT: Comparison is made with chest x-ray 3/23/2017.     No pulmonary infiltrate or consolidation is identified. Interstitial  markings are somewhat coarse as before. No pneumothorax or effusion is  identified. Heart size is normal. There is ectasia of the thoracic  aorta. There is a moderate-sized hiatal hernia. No acute osseous  abnormalities identified.       Impression:       No acute cardiopulmonary abnormality.  This report was finalized on 03/01/2019 18:30 by Dr. Dillon Guerrero MD.        3/2/2019 2/28/2019 11/5/2018       YES Not Indicated   Escherichia coli (A)       Urine Reflex to Culture   Organism    UA MICROSCOPIC GRP  Component Name  2/28/2019 2/28/2019 11/5/2018 11/5/2018       MODERATE (A) Negative     0-1 Waxy (A)         1+ (A)         6 - 10 (A)     3   6 - 10 (A)     1   3 - 5     1   3+     1+               Intake/Output    Intake/Output Summary (Last 24 hours) at 3/4/2019 1321  Last data filed at 3/4/2019 1135  Gross per 24 hour   Intake 1420 ml   Output 650 ml   Net 770 ml       Scheduled Meds    allopurinol 300 mg Oral Daily   cefdinir 300 mg Oral Q12H   cycloSPORINE 1 drop Both Eyes BID   enoxaparin 30 mg Subcutaneous Q24H   guaiFENesin 1,200 mg Oral Q12H   latanoprost 1 drop Right Eye Nightly   levothyroxine 50 mcg Oral Q AM   pantoprazole 40 mg Oral Q AM   sodium chloride 3 mL Intravenous Q12H   timolol 1 drop Both Eyes Q12H       I have reviewed the patient current medications.     Assessment/Plan     Active Hospital Problems    Diagnosis   • **Acute renal failure superimposed on stage 3 chronic kidney disease (CMS/HCC)   • Hyponatremia   • Asymptomatic bacteriuria     E. Coli per culture 2/28/19      • Diarrhea of presumed infectious origin   • Viral upper respiratory tract infection   •  Hypothyroidism (acquired)     Plan:  1.  Creatinine 3.1 on admission.  Down to 1.36 today.  2.  Discontinue IV fluids  3.  Renal ultrasound no signs of obstruction  4.  Serum osmolality 296 normal.  Urine osmolality and urine sodium pending  5.  Hold Accupril with elevated creatinine  6.  Blood culture no growth comes 2 days  7.  Urine culture obtained [facility] E. Coli.  Omnicef 300 mg twice daily for 3 days.  Started 3/3/19  8.  GI panel not sent.  No recent stool  9.  Lovenox for deep vein thrombosis prophylaxis.  Renal dose  10.  Basic metabolic panel tomorrow to monitor renal status  11.  Home medications reviewed and resumed if appropriate  12.  A1c 6.6, TSH 3.7  13.  CT scan of the abdomen no mass, fluid, or inflammatory process.  14.  Out of bed and ambulate.  Needs to ambulate in hallway.  Reports being weak.  Declines SNF.  Daughter not in room.     The above documentation resulted from a face-to-face encounter by me Cat RODAS, [facility].    Discharge Planning: I expect patient to be discharged to home in 1 day.    CLARK Jane   03/04/19   1:21 PM

## 2019-03-04 NOTE — PLAN OF CARE
Problem: Patient Care Overview  Goal: Plan of Care Review  Outcome: Ongoing (interventions implemented as appropriate)   03/03/19 1812   Coping/Psychosocial   Plan of Care Reviewed With patient   Plan of Care Review   Progress no change   OTHER   Outcome Summary IV fluids decreased to 75 cc/hr. Patient continues to c/o feeling fullness in her abdomen and belching. C/O headache x1 this shift, prn given with relief. Ambulated in the [place] with family several times today and tolerated well. Will continue to monitor. Call light within reach.       Problem: Renal Failure/Kidney Injury, Acute (Adult)  Goal: Signs and Symptoms of Listed Potential Problems Will be Absent, Minimized or Managed (Renal Failure/Kidney Injury, Acute)  Outcome: Ongoing (interventions implemented as appropriate)   03/03/19 1812   Goal/Outcome Evaluation   Problems Assessed (Acute Renal Failure/Kidney Injury) all   Problems Present (ARF/Kidney Injury) fluid imbalance;situational response

## 2019-03-04 NOTE — PLAN OF CARE
Problem: Patient Care Overview  Goal: Plan of Care Review  Outcome: Ongoing (interventions implemented as appropriate)   03/04/19 0340   Coping/Psychosocial   Plan of Care Reviewed With patient   Plan of Care Review   Progress no change   OTHER   Outcome Summary pt c/o abd discomfort on days.protonix started last night.tachy and multiform PVC on tele early shift. pt sleeping most of shift so far. no complaints. cont to monitor       Problem: Renal Failure/Kidney Injury, Acute (Adult)  Goal: Signs and Symptoms of Listed Potential Problems Will be Absent, Minimized or Managed (Renal Failure/Kidney Injury, Acute)  Outcome: Ongoing (interventions implemented as appropriate)

## 2019-03-04 NOTE — PROGRESS NOTES
Discharge Planning Assessment  [facility]     Patient Name: Kamille Saul  MRN: [id number]  Today's Date: 3/4/2019    Admit Date: 3/1/2019    Discharge Needs Assessment     Row Name 03/04/19 1441       Living Environment    Lives With  alone    Current Living Arrangements  home/apartment/condo    Primary Care Provided by  self    Provides Primary Care For  no one    Family Caregiver if Needed  none    Able to Return to Prior Arrangements  yes       Resource/Environmental Concerns    Resource/Environmental Concerns  none    Transportation Concerns  car, none       Transition Planning    Patient/Family Anticipates Transition to  home    Transportation Anticipated  family or friend will provide;car, drives self       Discharge Needs Assessment    Readmission Within the Last 30 Days  no previous admission in last 30 days    Concerns to be Addressed  no discharge needs identified    Equipment Currently Used at Home  none    Anticipated Changes Related to Illness  none    Equipment Needed After Discharge  none    Current Discharge Risk  lives alone        Discharge Plan     Row Name 03/04/19 1442       Plan    Plan  Home    Patient/Family in Agreement with Plan  yes    Plan Comments  Spoke with patient to assess for home needs. Pt lives alone at home and plans same. Pt is independent and denies home needs. Pt has RX coverage.  Will follow.         Destination      No service coordination in this encounter.      Durable Medical Equipment      No service coordination in this encounter.      Dialysis/Infusion      No service coordination in this encounter.      Home Medical Care      No service coordination in this encounter.      Community Resources      No service coordination in this encounter.          Demographic Summary    No documentation.       Functional Status    No documentation.       Psychosocial    No documentation.       Abuse/Neglect    No documentation.       Legal    No documentation.       Substance Abuse     No documentation.       Patient Forms    No documentation.           BHARGAVI Hill

## 2019-03-05 VITALS
OXYGEN SATURATION: 91 % | BODY MASS INDEX: 25.19 KG/M2 | HEART RATE: 83 BPM | RESPIRATION RATE: 18 BRPM | DIASTOLIC BLOOD PRESSURE: 74 MMHG | TEMPERATURE: 98.2 F | WEIGHT: 128.31 LBS | SYSTOLIC BLOOD PRESSURE: 147 MMHG | HEIGHT: 60 IN

## 2019-03-05 LAB
ANION GAP SERPL CALCULATED.3IONS-SCNC: 3 MMOL/L (ref 4–13)
BACTERIA SPEC AEROBE CULT: NORMAL
BUN BLD-MCNC: 12 MG/DL (ref 5–21)
BUN/CREAT SERPL: 9.2 (ref 7–25)
CALCIUM SPEC-SCNC: 7.6 MG/DL (ref 8.4–10.4)
CHLORIDE SERPL-SCNC: 108 MMOL/L (ref 98–110)
CO2 SERPL-SCNC: 24 MMOL/L (ref 24–31)
CREAT BLD-MCNC: 1.31 MG/DL (ref 0.5–1.4)
GFR SERPL CREATININE-BSD FRML MDRD: 38 ML/MIN/1.73
GLUCOSE BLD-MCNC: 84 MG/DL (ref 70–100)
POTASSIUM BLD-SCNC: 4.7 MMOL/L (ref 3.5–5.3)
SODIUM BLD-SCNC: 135 MMOL/L (ref 135–145)

## 2019-03-05 PROCEDURE — 94799 UNLISTED PULMONARY SVC/PX: CPT

## 2019-03-05 PROCEDURE — 80048 BASIC METABOLIC PNL TOTAL CA: CPT | Performed by: NURSE PRACTITIONER

## 2019-03-05 PROCEDURE — 94760 N-INVAS EAR/PLS OXIMETRY 1: CPT

## 2019-03-05 RX ORDER — PANTOPRAZOLE SODIUM 40 MG/1
40 TABLET, DELAYED RELEASE ORAL DAILY
Qty: 30 TABLET | Refills: 0 | Status: SHIPPED | OUTPATIENT
Start: 2019-03-05

## 2019-03-05 RX ORDER — CEFDINIR 300 MG/1
300 CAPSULE ORAL EVERY 12 HOURS SCHEDULED
Qty: 1 CAPSULE | Refills: 0 | Status: SHIPPED | OUTPATIENT
Start: 2019-03-05 | End: 2019-03-06

## 2019-03-05 RX ADMIN — CEFDINIR 300 MG: 300 CAPSULE ORAL at 09:00

## 2019-03-05 RX ADMIN — ALLOPURINOL 300 MG: 300 TABLET ORAL at 09:00

## 2019-03-05 RX ADMIN — CYCLOSPORINE 1 DROP: 0.5 EMULSION OPHTHALMIC at 09:00

## 2019-03-05 RX ADMIN — GUAIFENESIN 1200 MG: 600 TABLET, EXTENDED RELEASE ORAL at 09:00

## 2019-03-05 RX ADMIN — TIMOLOL MALEATE 1 DROP: 5 SOLUTION/ DROPS OPHTHALMIC at 09:02

## 2019-03-05 RX ADMIN — LEVOTHYROXINE SODIUM 50 MCG: 50 TABLET ORAL at 04:34

## 2019-03-05 RX ADMIN — PANTOPRAZOLE SODIUM 40 MG: 40 TABLET, DELAYED RELEASE ORAL at 04:34

## 2019-03-05 NOTE — NURSING NOTE
No c/o of any kind so far this shift. Pt ambulating to bathroom and according to days has been ambulating in the alonzo with family some. Pt ready for d/c in the AM. Cont to monitor

## 2019-03-05 NOTE — PROGRESS NOTES
Continued Stay Note  [facility]     Patient Name: Kamille Saul  MRN: [id number]  Today's Date: 3/5/2019    Admit Date: 3/1/2019    Discharge Plan     Row Name 03/05/19 0958       Plan    Plan  [facility]     Patient/Family in Agreement with Plan  yes    Final Discharge Disposition Code  06 - home with home health care    Final Note  Patient is being discharged home today. Pt has  care ordered, with options in area of residence she prefers [facility] . Called Mel [phone] and provided referral.         Discharge Codes    No documentation.       Expected Discharge Date and Time     Expected Discharge Date Expected Discharge Time    Mar 5, 2019             BHARGAVI Hill

## 2019-03-05 NOTE — DISCHARGE SUMMARY
[facility] Medicine Services  DISCHARGE SUMMARY     Date of Admission: 3/1/2019  Date of Discharge:  3/5/2019  Primary Care Physician: Shelley Tidwell MD    Presenting Problem/History of Present Illness:  Acute renal failure (ARF) (CMS/[facility]) [N17.9]     Discharge Diagnoses:  Active Hospital Problems    Diagnosis   • **Acute renal failure superimposed on stage 3 chronic kidney disease (CMS/[facility])   • Hyponatremia   • Asymptomatic bacteriuria     E. Coli per culture 2/28/19      • Diarrhea of presumed infectious origin   • Viral upper respiratory tract infection   • Hypothyroidism (acquired)     Chief Complaint on Day of Discharge: No complaints.  Feels better.  Still  weak.  History of Present Illness on Day of Discharge:   Lying in bed.  No family in room.  She ate breakfast this morning without nausea or vomiting.  She had a small bowel movement this morning.  Denies chest pain or palpitations.  No abdominal pain.  Ambulated in the hallway yesterday.  Denies dysuria.  Feels better still weak.  Has agreed to home health.    Consults: None    Procedures Performed: None    Pertinent Test Results:   Laboratory Data:    Results from last 7 days   Lab Units 03/02/19  0558 03/01/19  1812   WBC 10*3/mm3 8.77 7.73   HEMOGLOBIN g/dL 11.4* 11.6*   HEMATOCRIT % 32.8* 34.9*   PLATELETS 10*3/mm3 177 177        Results from last 7 days   Lab Units 03/05/19  0453 03/04/19  0545 03/03/19  0540  03/01/19  1812   SODIUM mmol/L 135 136 132*   < > 130*   POTASSIUM mmol/L 4.7 4.1 4.1   < > 4.3   CHLORIDE mmol/L 108 109 108   < > 99   CO2 mmol/L 24.0 20.0* 18.0*   < > 17.0*   BUN mg/dL 12 16 28*   < > 38*   CREATININE mg/dL 1.31 1.36 2.24*   < > 3.14*   CALCIUM mg/dL 7.6* 7.7* 7.8*   < > 9.3   BILIRUBIN mg/dL  --   --  0.3  --  0.4   ALK PHOS U/L  --   --  53  --  84   ALT (SGPT) U/L  --   --  36  --  37   AST (SGOT) U/L  --   --  43  --  51*   GLUCOSE mg/dL 84 86 92   < > 99    < > = values in this  interval not displayed.     Lab Results (all)     Procedure Component Value Units Date/Time    Urine Culture - Urine, Urine, Catheter [[id number]]  (Normal) Collected:  03/03/19 2018    Specimen:  Urine, Catheter Updated:  03/05/19 0728     Urine Culture No growth at 2 days    Basic Metabolic Panel [[id number]]  (Abnormal) Collected:  03/05/19 0453    Specimen:  Blood Updated:  03/05/19 0530     Glucose 84 mg/dL      BUN 12 mg/dL      Creatinine 1.31 mg/dL      Sodium 135 mmol/L      Potassium 4.7 mmol/L      Chloride 108 mmol/L      CO2 24.0 mmol/L      Calcium 7.6 mg/dL      eGFR Non African Amer 38 mL/min/1.73      BUN/Creatinine Ratio 9.2     Anion Gap 3.0 mmol/L     Narrative:       The MDRD GFR formula is only valid for adults with stable renal function between ages 18 and 70.    Blood Culture - Blood, Arm, Left [[id number]] Collected:  03/01/19 1812    Specimen:  Blood from Arm, Left Updated:  03/04/19 1845     Blood Culture No growth at 3 days    Blood Culture - Blood, Arm, Right [[id number]] Collected:  03/01/19 1812    Specimen:  Blood from Arm, Right Updated:  03/04/19 1845     Blood Culture No growth at 3 days    Basic Metabolic Panel [[id number]]  (Abnormal) Collected:  03/04/19 0545    Specimen:  Blood Updated:  03/04/19 0650     Glucose 86 mg/dL      BUN 16 mg/dL      Creatinine 1.36 mg/dL      Sodium 136 mmol/L      Potassium 4.1 mmol/L      Chloride 109 mmol/L      CO2 20.0 mmol/L      Calcium 7.7 mg/dL      eGFR Non African Amer 36 mL/min/1.73      BUN/Creatinine Ratio 11.8     Anion Gap 7.0 mmol/L     Narrative:       The MDRD GFR formula is only valid for adults with stable renal function between ages 18 and 70.    Urinalysis With Culture If Indicated - Urine, Catheter [[id number]]  (Abnormal) Collected:  03/03/19 2018    Specimen:  Urine, Catheter Updated:  03/03/19 2123     Color, UA Yellow     Appearance, UA Clear     pH, UA 5.5     Specific Gravity, UA 1.009     Glucose, UA Negative     Ketones, UA  Negative     Bilirubin, UA Negative     Blood, UA Trace     Protein, UA Negative     Leuk Esterase, UA Trace     Nitrite, UA Negative     Urobilinogen, UA 0.2 E.U./dL    Urinalysis, Microscopic Only - Urine, Catheter [[id number]]  (Abnormal) Collected:  03/03/19 2018    Specimen:  Urine, Catheter Updated:  03/03/19 2123     RBC, UA 0-2 /HPF      WBC, UA 6-12 /HPF      Bacteria, UA None Seen /HPF      Squamous Epithelial Cells, UA None Seen /HPF      Hyaline Casts, UA None Seen /LPF      Methodology Automated Microscopy    Comprehensive Metabolic Panel [[id number]]  (Abnormal) Collected:  03/03/19 0540    Specimen:  Blood Updated:  03/03/19 0628     Glucose 92 mg/dL      BUN 28 mg/dL      Creatinine 2.24 mg/dL      Sodium 132 mmol/L      Potassium 4.1 mmol/L      Chloride 108 mmol/L      CO2 18.0 mmol/L      Calcium 7.8 mg/dL      Total Protein 4.6 g/dL      Albumin 2.60 g/dL      ALT (SGPT) 36 U/L      AST (SGOT) 43 U/L      Alkaline Phosphatase 53 U/L      Total Bilirubin 0.3 mg/dL      eGFR Non African Amer 20 mL/min/1.73      Globulin 2.0 gm/dL      A/G Ratio 1.3 g/dL      BUN/Creatinine Ratio 12.5     Anion Gap 6.0 mmol/L     Narrative:       The MDRD GFR formula is only valid for adults with stable renal function between ages 18 and 70.    Osmolality, Serum [[id number]]  (Normal) Collected:  03/02/19 1204    Specimen:  Blood Updated:  03/02/19 1351     Osmolality 296 mOsm/kg     TSH [[id number]]  (Normal) Collected:  03/02/19 0558    Specimen:  Blood Updated:  03/02/19 0735     TSH 3.700 mIU/mL     Hemoglobin A1c [[id number]] Collected:  03/02/19 0558    Specimen:  Blood Updated:  03/02/19 0726     Hemoglobin A1C 6.6 %     Narrative:       Less than 6.0           Non-Diabetic Range  6.0-7.0                 ADA Therapeutic Target  Greater than 7.0        Action Suggested    Lipid Panel [[id number]]  (Abnormal) Collected:  03/02/19 0558    Specimen:  Blood Updated:  03/02/19 0715     Total Cholesterol 114 mg/dL       Triglycerides 102 mg/dL      HDL Cholesterol 69 mg/dL      LDL Cholesterol  51 mg/dL      LDL/HDL Ratio 0.36    Basic Metabolic Panel [[id number]]  (Abnormal) Collected:  03/02/19 0558    Specimen:  Blood Updated:  03/02/19 0705     Glucose 99 mg/dL      BUN 39 mg/dL      Creatinine 3.06 mg/dL      Sodium 130 mmol/L      Potassium 4.7 mmol/L      Chloride 102 mmol/L      CO2 14.0 mmol/L      Calcium 8.9 mg/dL      eGFR  African Amer -- mL/min/1.73      Comment: <15 Indicative of kidney failure.        eGFR Non African Amer 14 mL/min/1.73      Comment: <15 Indicative of kidney failure.        BUN/Creatinine Ratio 12.7     Anion Gap 14.0 mmol/L     Narrative:       The MDRD GFR formula is only valid for adults with stable renal function between ages 18 and 70.    CBC (No Diff) [[id number]]  (Abnormal) Collected:  03/02/19 0558    Specimen:  Blood Updated:  03/02/19 0651     WBC 8.77 10*3/mm3      RBC 3.49 10*6/mm3      Hemoglobin 11.4 g/dL      Hematocrit 32.8 %      MCV 94.0 fL      MCH 32.7 pg      MCHC 34.8 g/dL      RDW 15.7 %      RDW-SD 53.8 fl      MPV 9.7 fL      Platelets 177 10*3/mm3     Comprehensive Metabolic Panel [[id number]]  (Abnormal) Collected:  03/01/19 1812    Specimen:  Blood Updated:  03/01/19 1852     Glucose 99 mg/dL      BUN 38 mg/dL      Creatinine 3.14 mg/dL      Sodium 130 mmol/L      Potassium 4.3 mmol/L      Chloride 99 mmol/L      CO2 17.0 mmol/L      Calcium 9.3 mg/dL      Total Protein 6.5 g/dL      Albumin 3.90 g/dL      ALT (SGPT) 37 U/L      AST (SGOT) 51 U/L      Alkaline Phosphatase 84 U/L      Total Bilirubin 0.4 mg/dL      eGFR Non African Amer 14 mL/min/1.73      Comment: <15 Indicative of kidney failure.        eGFR   Amer -- mL/min/1.73      Comment: <15 Indicative of kidney failure.        Globulin 2.6 gm/dL      A/G Ratio 1.5 g/dL      BUN/Creatinine Ratio 12.1     Anion Gap 14.0 mmol/L     Narrative:       The MDRD GFR formula is only valid for adults with stable renal  function between ages 18 and 70.    CBC Auto Differential [[id number]]  (Abnormal) Collected:  03/01/19 1812    Specimen:  Blood Updated:  03/01/19 1833     WBC 7.73 10*3/mm3      RBC 3.63 10*6/mm3      Hemoglobin 11.6 g/dL      Hematocrit 34.9 %      MCV 96.1 fL      MCH 32.0 pg      MCHC 33.2 g/dL      RDW 15.9 %      RDW-SD 57.1 fl      MPV 9.6 fL      Platelets 177 10*3/mm3      Neutrophil % 69.8 %      Lymphocyte % 18.8 %      Monocyte % 9.7 %      Eosinophil % 0.8 %      Basophil % 0.3 %      Immature Grans % 0.6 %      Neutrophils, Absolute 5.40 10*3/mm3      Lymphocytes, Absolute 1.45 10*3/mm3      Monocytes, Absolute 0.75 10*3/mm3      Eosinophils, Absolute 0.06 10*3/mm3      Basophils, Absolute 0.02 10*3/mm3      Immature Grans, Absolute 0.05 10*3/mm3      nRBC 0.0 /100 WBC         Culture Data:   Blood Culture   Date Value Ref Range Status   03/01/2019 No growth at 3 days  Preliminary   03/01/2019 No growth at 3 days  Preliminary     Urine Culture   Date Value Ref Range Status   03/03/2019 No growth at 2 days  Final     Imaging Results (all)     Procedure Component Value Units Date/Time    US Renal Bilateral [[id number]] Collected:  03/02/19 1151     Updated:  03/02/19 1155    Narrative:       EXAMINATION: US RENAL BILATERAL-     3/2/2019 11:18 AM CST     HISTORY: acute renal failure; R13.10-Dysphagia, unspecified.     Grayscale and color flow ultrasound evaluation of both kidneys.     Small bilateral renal cysts.  The largest cyst is on the right and measures 1.4 cm.  No hydronephrosis.     Mildly echogenic renal parenchyma compatible with chronic renal disease.     Symmetric kidneys.  Right kidney = 8.3 x 4.2 x 4.8 cm.  Left kidney = 8.4 x 4.5 x 4.5 cm.     Summary:  1. Echogenic renal parenchyma with no sign of obstruction.  This report was finalized on 03/02/2019 11:52 by Dr. Afshin Luna MD.    CT Abdomen Pelvis Without Contrast [[id number]] Collected:  03/02/19 0929     Updated:  03/02/19 0934     Narrative:       EXAMINATION: CT ABDOMEN PELVIS WO CONTRAST-      3/2/2019 9:10 AM CST     HISTORY: Nausea, vomiting, diarrhea; R13.10-Dysphagia, unspecified     In order to have a CT radiation dose as low as reasonably achievable  Automated Exposure Control was utilized for adjustment of the mA and/or  KV according to patient size.     DLP in mGycm= 239.     Noncontrast abdomen/pelvis CT.     No comparison.     Normal heart size.  Clear lung bases.     Normal noncontrast appearance of the liver.  Large hiatal hernia.  The gallbladder is mildly contracted.  Normal pancreas and spleen.  Normal and symmetric adrenal glands and kidneys. Lower pole 1 cm right  renal cyst.  Extensive calcification of the aorta with no aneurysm.  There is no bowel dilation.  No appendicitis or diverticulitis.  No sign of colitis.     No pelvic mass or fluid collection.     High-grade degenerative disc and endplate changes greatest at L1-2,  L2-3, and L5-S1.     Summary:  1. No mass, fluid collection, or inflammatory process.   This report was finalized on 03/02/2019 09:31 by Dr. Afshin Luna MD.    XR Chest PA & Lateral [[id number]] Collected:  03/01/19 1829     Updated:  03/01/19 1833    Narrative:       EXAMINATION: XR CHEST PA AND LATERAL- 3/1/2019 6:29 PM CST     HISTORY: Upper respiratory infection.     REPORT: Comparison is made with chest x-ray 3/23/2017.     No pulmonary infiltrate or consolidation is identified. Interstitial  markings are somewhat coarse as before. No pneumothorax or effusion is  identified. Heart size is normal. There is ectasia of the thoracic  aorta. There is a moderate-sized hiatal hernia. No acute osseous  abnormalities identified.       Impression:       No acute cardiopulmonary abnormality.  This report was finalized on 03/01/2019 18:30 by Dr. Dillon Guerrero MD.        Hospital Course  Patient is a 89 y.o. female presented to [facility] as directed by her primary care provider Dr. Shelley Tidwell  for elevated creatinine.  Patient reported 2 weeks ago she developed cough, yellow sputum production, subjective fevers and diarrhea.  She did not see her primary care provider.  However, after encouragement from her daughter she saw her primary care provider day before admission and lab work was drawn.  She was contacted on the day of admissin and advised to present to [facility] due to abnormal lab work.  Creatinine 2.2, sodium 134.  Baseline creatinine 1.5.  WBC 9.1.  Influenza A-, influenza B negative.  Urinalysis positive for leukocytes and blood.  3+ bacteria with culture pending at that time.    She was admitted to the medical floor with acute kidney injury, recent diarrhea, asymptomatic bacteriuria.  Repeat lab on admission noted creatinine 3.14, sodium 130.  She was placed on IV fluid hydration.  Electrolytes monitored and creatinine slowly trended down to 1.36 on 3/4 and 1.31 on 3/5.  Baseline creatinine 1.3. Sodium 130 on admission and 135 by discharge.  She had no further episodes of diarrhea since admission.  She has had semi-formed stools.  GI panel ordered but not obtained.  She denied any abdominal cramping.  CT scan of the abdomen no mass, fluid collection or inflammatory process.  Chest x-ray no acute cardiopulmonary abnormality.  Renal ultrasound echogenic renal parenchyma with no signs of obstruction.  She denied dysuria.    She has a history of hypertension.  Lisinopril placed on hold upon admission due to acute kidney injury.  Creatinine returned to 1.3 baseline.  She may resume lisinopril at discharge.    Urinalysis outside facility 2/28 positive for E. coli.  Patient did deny dysuria.  She was placed on Omnicef for 3 days.    She complained of being weak.  She was able to ambulate in the hallway.  Patient and daughter requested home health at discharge.  Plans are to return home with family members staying with her.  Prior to admission she lived independently.     On 3/5/19 she is  "medically stable for discharge.  She is tolerating regular diet without nausea, vomiting or diarrhea.  She denies abdominal pain.  Creatinine 1.3, sodium 135.  She has ambulated in the hallway.  Have arranged follow-up with her primary care provider Dr. Tidwell on 3/14/19.    Physical Exam on Discharge:  /74 (BP Location: Right arm, Patient Position: Lying)   Pulse 51   Temp 98.2 °F (36.8 °C) (Oral)   Resp 18   Ht 152.4 cm (60\")   Wt 58.2 kg (128 lb 5 oz)   SpO2 95%   BMI 25.06 kg/m²   Physical Exam  Constitutional: She is oriented to person, place, and time. She appears well-developed and well-nourished.   HENT:   Head: Normocephalic and atraumatic.   Eyes: EOM are normal. Pupils are equal, round, and reactive to light.   Neck: Normal range of motion. Neck supple.   Cardiovascular: Normal rate, regular rhythm, normal heart sounds and intact distal pulses. Exam reveals no gallop and no friction rub.   No murmur heard.  Normal sinus rhythm 78-95 on telemetry   Pulmonary/Chest: Effort normal and breath sounds normal. She has no wheezes. She has no rales.   No oxygen and use   Abdominal: She exhibits no distension. There is no tenderness.   Genitourinary:   Genitourinary Comments: Voiding.   Musculoskeletal: Normal range of motion. She exhibits no edema.   Neurological: She is alert and oriented to person, place, and time.   Skin: Skin is warm and dry. No erythema.   Psychiatric: She has a normal mood and affect. Her behavior is normal. Judgment and thought content normal.     Condition on Discharge: Stable    Discharge Disposition: Home with home health    Discharge Diet:   Diet Instructions     Advance Diet As Tolerated        As tolerated    Activity at Discharge:   Activity Instructions     Activity as Tolerated        As tolerated    Discharge Care Plan / Instructions:   1.  For worsening returning symptoms of diarrhea seek medical attention    Discharge Medications:     Discharge Medications      New " Medications      Instructions Start Date   cefdinir 300 MG capsule  Commonly known as:  OMNICEF   300 mg, Oral, Every 12 Hours Scheduled      pantoprazole 40 MG EC tablet  Commonly known as:  PROTONIX   40 mg, Oral, Daily         Continue These Medications      Instructions Start Date   allopurinol 300 MG tablet  Commonly known as:  ZYLOPRIM   300 mg, Oral, Daily      aspirin 325 MG tablet   325 mg, Oral, Daily      CloNIDine 0.1 MG tablet  Commonly known as:  CATAPRES   0.1 mg, Oral, 3 Times Daily PRN      colchicine 0.6 MG tablet   0.6 mg, Oral, 2 Times Daily PRN      cycloSPORINE 0.05 % ophthalmic emulsion  Commonly known as:  RESTASIS   1 drop, Both Eyes, 2 Times Daily      LUMIGAN 0.01 % ophthalmic drops  Generic drug:  bimatoprost   1 drop, Right Eye, Nightly      MULTIVITAMIN ADULT PO   1 tablet, Oral, Daily      niacin 500 MG tablet   500 mg, Oral, 2 Times Daily With Meals, 2 capsules daily      ondansetron 4 MG tablet  Commonly known as:  ZOFRAN   4 mg, Oral, Every 8 Hours PRN      Potassium Gluconate 550 MG tablet   1 tablet, Oral, Daily      quinapril 40 MG tablet  Commonly known as:  ACCUPRIL   40 mg, Oral, 2 Times Daily      rizatriptan 10 MG tablet  Commonly known as:  MAXALT   10 mg, Oral, Once As Needed, May repeat in 2 hours if needed       SYNTHROID 50 MCG tablet  Generic drug:  levothyroxine   50 mcg, Oral, Daily      timolol 0.5 % ophthalmic solution  Commonly known as:  TIMOPTIC   1 drop, Both Eyes, 2 Times Daily           Follow-up Appointments:   Follow-up Information     Shelley Tidwell MD Follow up on 3/14/2019.    Specialty:  Internal Medicine  Why:  follow up March 14 at 8:30 am  Contact information:  [address] [place] [address] [address]  [facility] [address]  [phone]                 Future Appointments:  No future appointments.    Test Results Pending at Discharge: None    The above documentation resulted from a face-to-face encounter by me Cat RODAS, [facility].    Cat Galicia,  CLARK  03/05/19  9:43 AM    Time:  This discharge process required 35 minutes for completion     Plan discussed with Dr. Araujo, patient, and daughter, Leanne    Time spent in face-to-face evaluation, chart review, planning and education 35 minutes.  Please note that portions of this note may have been completed with a voice recognition program. Efforts were made to edit the dictations, but occasionally words are mistranscribed.

## 2019-03-06 ENCOUNTER — READMISSION MANAGEMENT (OUTPATIENT)
Dept: CALL CENTER | Facility: HOSPITAL | Age: 84
End: 2019-03-06

## 2019-03-06 ENCOUNTER — TELEPHONE (OUTPATIENT)
Dept: INTERNAL MEDICINE | Age: 84
End: 2019-03-06

## 2019-03-06 LAB
BACTERIA SPEC AEROBE CULT: NORMAL
BACTERIA SPEC AEROBE CULT: NORMAL

## 2019-03-06 NOTE — OUTREACH NOTE
Prep Survey      Responses   Facility patient discharged from?  [place]   Is patient eligible?  Yes   Discharge diagnosis  Acute renal failure superimposed on stage 3 chronic    Does the patient have one of the following disease processes/diagnoses(primary or secondary)?  Other   Does the patient have Home health ordered?  Yes   What is the Home health agency?   [facility]   Is there a DME ordered?  No   Prep survey completed?  Yes          Christina Howard RN

## 2019-03-07 ENCOUNTER — READMISSION MANAGEMENT (OUTPATIENT)
Dept: CALL CENTER | Facility: HOSPITAL | Age: 84
End: 2019-03-07

## 2019-03-07 NOTE — OUTREACH NOTE
Medical Week 1 Survey      Responses   Facility patient discharged from?  [place]   Does the patient have one of the following disease processes/diagnoses(primary or secondary)?  Other   Is there a successful TCM telephone encounter documented?  No   Week 1 attempt successful?  Yes   Call start time  0828   Call end time  0832   Is patient permission given to speak with other caregiver?  No   Meds reviewed with patient/caregiver?  Yes   Is the patient having any side effects they believe may be caused by any medication additions or changes?  No   Is the patient taking all medications as directed (includes completed medication regime)?  Yes   Comments regarding appointments  Review her appointment with Dr. Tidwell   Does the patient have a primary care provider?   Yes   Does the patient have an appointment with their PCP within 7 days of discharge?  Yes   Has the patient kept scheduled appointments due by today?  Yes   What is the Home health agency?   [facility]   Has home health visited the patient within 72 hours of discharge?  N/A   Psychosocial issues?  No   Did the patient receive a copy of their discharge instructions?  Yes   Nursing interventions  Reviewed instructions with patient   What is the patient's perception of their health status since discharge?  Improving   Is the patient/caregiver able to teach back signs and symptoms related to disease process for when to call PCP?  Yes   Is the patient/caregiver able to teach back signs and symptoms related to disease process for when to call 911?  Yes   Is the patient/caregiver able to teach back the hierarchy of who to call/visit for symptoms/problems? PCP, Specialist, Home health nurse, Urgent Care, ED, 911  Yes   Week 1 call completed?  Yes   Wrap up additional comments  feels she is improved since she has  been home          Sary Schaffer RN

## 2019-03-10 PROBLEM — N17.9 ACUTE RENAL FAILURE (HCC): Status: ACTIVE | Noted: 2019-03-10

## 2019-03-10 ASSESSMENT — ENCOUNTER SYMPTOMS
EYE DISCHARGE: 0
COUGH: 0
ABDOMINAL DISTENTION: 1
SHORTNESS OF BREATH: 0
EYE REDNESS: 0
DIARRHEA: 1
ABDOMINAL PAIN: 1
BACK PAIN: 0

## 2019-03-14 ENCOUNTER — OFFICE VISIT (OUTPATIENT)
Dept: INTERNAL MEDICINE | Age: 84
End: 2019-03-14
Payer: MEDICARE

## 2019-03-14 ENCOUNTER — READMISSION MANAGEMENT (OUTPATIENT)
Dept: CALL CENTER | Facility: HOSPITAL | Age: 84
End: 2019-03-14

## 2019-03-14 VITALS
BODY MASS INDEX: 23.36 KG/M2 | SYSTOLIC BLOOD PRESSURE: 96 MMHG | HEIGHT: 60 IN | DIASTOLIC BLOOD PRESSURE: 70 MMHG | WEIGHT: 119 LBS | HEART RATE: 75 BPM | OXYGEN SATURATION: 96 %

## 2019-03-14 DIAGNOSIS — N17.9 ACUTE RENAL FAILURE, UNSPECIFIED ACUTE RENAL FAILURE TYPE (HCC): ICD-10-CM

## 2019-03-14 DIAGNOSIS — N18.3 ACUTE RENAL FAILURE SUPERIMPOSED ON STAGE 3 CHRONIC KIDNEY DISEASE, UNSPECIFIED ACUTE RENAL FAILURE TYPE: Primary | ICD-10-CM

## 2019-03-14 DIAGNOSIS — N18.30 CHRONIC KIDNEY DISEASE, STAGE III (MODERATE) (HCC): ICD-10-CM

## 2019-03-14 DIAGNOSIS — N17.9 ACUTE RENAL FAILURE SUPERIMPOSED ON STAGE 3 CHRONIC KIDNEY DISEASE, UNSPECIFIED ACUTE RENAL FAILURE TYPE: Primary | ICD-10-CM

## 2019-03-14 DIAGNOSIS — R19.7 DIARRHEA OF PRESUMED INFECTIOUS ORIGIN: ICD-10-CM

## 2019-03-14 DIAGNOSIS — R82.71 ASYMPTOMATIC BACTERIURIA: ICD-10-CM

## 2019-03-14 PROCEDURE — 1111F DSCHRG MED/CURRENT MED MERGE: CPT | Performed by: INTERNAL MEDICINE

## 2019-03-14 PROCEDURE — 99495 TRANSJ CARE MGMT MOD F2F 14D: CPT | Performed by: INTERNAL MEDICINE

## 2019-03-14 RX ORDER — PANTOPRAZOLE SODIUM 40 MG/1
40 TABLET, DELAYED RELEASE ORAL DAILY
COMMUNITY
End: 2019-06-07 | Stop reason: SDUPTHER

## 2019-03-14 NOTE — OUTREACH NOTE
Medical Week 2 Survey      Responses   Facility patient discharged from?  [place]   Does the patient have one of the following disease processes/diagnoses(primary or secondary)?  Other   Week 2 attempt successful?  No   Unsuccessful attempts  Attempt 1          Jacquelin Paz RN

## 2019-03-18 ENCOUNTER — READMISSION MANAGEMENT (OUTPATIENT)
Dept: CALL CENTER | Facility: HOSPITAL | Age: 84
End: 2019-03-18

## 2019-03-18 NOTE — OUTREACH NOTE
Medical Week 2 Survey      Responses   Facility patient discharged from?  [place]   Does the patient have one of the following disease processes/diagnoses(primary or secondary)?  Other   Week 2 attempt successful?  Yes   Call start time  1052   Call end time  1056   Meds reviewed with patient/caregiver?  Yes   Does the patient have all medications ordered at discharge?  Yes   Is the patient taking all medications as directed (includes completed medication regime)?  Yes   Has the patient kept scheduled appointments due by today?  Yes   Has home health visited the patient within 72 hours of discharge?  Yes   Psychosocial issues?  No   What is the patient's perception of their health status since discharge?  Improving   Week 2 Call Completed?  Yes   Graduated  Yes   Did the patient feel the follow up calls were helpful during their recovery period?  Yes   Was the number of calls appropriate?  Yes   Graduated/Revoked comments  Pt health status back to baseline, all meds obtained and taking without problems          Bouchra Werner RN

## 2019-03-24 PROBLEM — R19.7 DIARRHEA OF PRESUMED INFECTIOUS ORIGIN: Status: ACTIVE | Noted: 2019-03-24

## 2019-03-24 PROBLEM — R82.71 ASYMPTOMATIC BACTERIURIA: Status: ACTIVE | Noted: 2019-03-24

## 2019-03-24 ASSESSMENT — ENCOUNTER SYMPTOMS
EYE DISCHARGE: 0
ABDOMINAL DISTENTION: 0
SHORTNESS OF BREATH: 0
SINUS PRESSURE: 0
ABDOMINAL PAIN: 0
COUGH: 0
EYE REDNESS: 0

## 2019-05-14 ENCOUNTER — OFFICE VISIT (OUTPATIENT)
Dept: INTERNAL MEDICINE | Age: 84
End: 2019-05-14
Payer: MEDICARE

## 2019-05-14 VITALS
TEMPERATURE: 96.7 F | WEIGHT: 109 LBS | BODY MASS INDEX: 21.4 KG/M2 | DIASTOLIC BLOOD PRESSURE: 76 MMHG | HEART RATE: 73 BPM | SYSTOLIC BLOOD PRESSURE: 146 MMHG | HEIGHT: 60 IN

## 2019-05-14 DIAGNOSIS — R30.0 DYSURIA: ICD-10-CM

## 2019-05-14 DIAGNOSIS — R19.7 DIARRHEA, UNSPECIFIED TYPE: ICD-10-CM

## 2019-05-14 DIAGNOSIS — R30.0 DYSURIA: Primary | ICD-10-CM

## 2019-05-14 LAB
ALBUMIN SERPL-MCNC: 4 G/DL (ref 3.5–5.2)
ALP BLD-CCNC: 117 U/L (ref 35–104)
ALT SERPL-CCNC: 42 U/L (ref 5–33)
ANION GAP SERPL CALCULATED.3IONS-SCNC: 14 MMOL/L (ref 7–19)
APPEARANCE FLUID: CLEAR
AST SERPL-CCNC: 46 U/L (ref 5–32)
BASOPHILS ABSOLUTE: 0.1 K/UL (ref 0–0.2)
BASOPHILS RELATIVE PERCENT: 0.9 % (ref 0–1)
BILIRUB SERPL-MCNC: 0.3 MG/DL (ref 0.2–1.2)
BILIRUBIN, POC: NORMAL
BLOOD URINE, POC: NORMAL
BUN BLDV-MCNC: 37 MG/DL (ref 8–23)
CALCIUM SERPL-MCNC: 9.4 MG/DL (ref 8.2–9.6)
CHLORIDE BLD-SCNC: 101 MMOL/L (ref 98–111)
CLARITY, POC: CLEAR
CO2: 20 MMOL/L (ref 22–29)
COLOR, POC: YELLOW
CREAT SERPL-MCNC: 1.9 MG/DL (ref 0.5–0.9)
EOSINOPHILS ABSOLUTE: 0.2 K/UL (ref 0–0.6)
EOSINOPHILS RELATIVE PERCENT: 2.1 % (ref 0–5)
GFR NON-AFRICAN AMERICAN: 25
GLUCOSE BLD-MCNC: 182 MG/DL (ref 74–109)
GLUCOSE URINE, POC: NORMAL
HCT VFR BLD CALC: 39.6 % (ref 37–47)
HEMOGLOBIN: 12.9 G/DL (ref 12–16)
KETONES, POC: NORMAL
LEUKOCYTE EST, POC: NORMAL
LYMPHOCYTES ABSOLUTE: 2.2 K/UL (ref 1.1–4.5)
LYMPHOCYTES RELATIVE PERCENT: 27.2 % (ref 20–40)
MCH RBC QN AUTO: 33.3 PG (ref 27–31)
MCHC RBC AUTO-ENTMCNC: 32.6 G/DL (ref 33–37)
MCV RBC AUTO: 102.3 FL (ref 81–99)
MONOCYTES ABSOLUTE: 0.9 K/UL (ref 0–0.9)
MONOCYTES RELATIVE PERCENT: 10.7 % (ref 0–10)
NEUTROPHILS ABSOLUTE: 4.7 K/UL (ref 1.5–7.5)
NEUTROPHILS RELATIVE PERCENT: 58.2 % (ref 50–65)
NITRITE, POC: NORMAL
PDW BLD-RTO: 13.9 % (ref 11.5–14.5)
PH, POC: 5.5
PLATELET # BLD: 221 K/UL (ref 130–400)
PMV BLD AUTO: 9.4 FL (ref 9.4–12.3)
POTASSIUM SERPL-SCNC: 4.9 MMOL/L (ref 3.5–5)
PROTEIN, POC: 30
RBC # BLD: 3.87 M/UL (ref 4.2–5.4)
SODIUM BLD-SCNC: 135 MMOL/L (ref 136–145)
SPECIFIC GRAVITY, POC: 1.02
TOTAL PROTEIN: 6.8 G/DL (ref 6.6–8.7)
UROBILINOGEN, POC: 0.2
WBC # BLD: 8 K/UL (ref 4.8–10.8)

## 2019-05-14 PROCEDURE — 1090F PRES/ABSN URINE INCON ASSESS: CPT | Performed by: NURSE PRACTITIONER

## 2019-05-14 PROCEDURE — 81002 URINALYSIS NONAUTO W/O SCOPE: CPT | Performed by: NURSE PRACTITIONER

## 2019-05-14 PROCEDURE — G8427 DOCREV CUR MEDS BY ELIG CLIN: HCPCS | Performed by: NURSE PRACTITIONER

## 2019-05-14 PROCEDURE — G8420 CALC BMI NORM PARAMETERS: HCPCS | Performed by: NURSE PRACTITIONER

## 2019-05-14 PROCEDURE — 1123F ACP DISCUSS/DSCN MKR DOCD: CPT | Performed by: NURSE PRACTITIONER

## 2019-05-14 PROCEDURE — 99213 OFFICE O/P EST LOW 20 MIN: CPT | Performed by: NURSE PRACTITIONER

## 2019-05-14 PROCEDURE — 4040F PNEUMOC VAC/ADMIN/RCVD: CPT | Performed by: NURSE PRACTITIONER

## 2019-05-14 PROCEDURE — 1036F TOBACCO NON-USER: CPT | Performed by: NURSE PRACTITIONER

## 2019-05-14 ASSESSMENT — ENCOUNTER SYMPTOMS
RHINORRHEA: 0
VOMITING: 0
PHOTOPHOBIA: 0
SHORTNESS OF BREATH: 0
NAUSEA: 0
COLOR CHANGE: 0
BACK PAIN: 0
COUGH: 0
VOICE CHANGE: 0

## 2019-05-14 NOTE — PROGRESS NOTES
[facility]  [facility]  [address] [place] [address]  Dept: [phone]  Dept Fax: [phone]  Loc: [phone]    Darvin Alfredo is a 80 y.o. female who presents today for her medical conditions/complaints as noted below. Darvin Alfredo is c/o of Diarrhea (Diarrhea continues, fatigue, wants to also rule out a UTI)        HPI:     HPI   Chief Complaint   Patient presents with    Diarrhea     Diarrhea continues, fatigue, wants to also rule out a UTI     Patient presents today for evaluation of possible UTI; evidently she had had diarrhea for several days and daughter is concerned she developed UTI. Patient has issues with recurrent diarrhea; she states it is only in the morning, however. She is not really having UTI symptoms, no fever or dysuria.     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     Chronic kidney disease     Eczema     Fibrocystic breast disease (FCBD)     Fibromyalgia     Hypertensive heart disease     Migraine     Mixed hyperlipidemia     Osteoarthritis     Skin cancer     Dr Reynold Dunlap, rt ant tibial region, 9/07, 1/16 Dr Benjamin Méndez      Past Surgical History:   Procedure Laterality Date    APPENDECTOMY      COLONOSCOPY      HYSTERECTOMY         Vitals 5/14/2019 3/14/2019 3/1/2019 2/28/2019 11/8/2018 7/4/7008   SYSTOLIC 498 96 60 217 253 518   DIASTOLIC 76 70 40 68 80 90   Site Left Upper Arm Left Upper Arm Left Upper Arm Left Upper Arm Left Upper Arm Left Arm   Position Sitting - - Sitting - -   Pulse 73 75 96 78 72 -   Temp 96.7 - - 97.9 - -   Resp - - - - - -   SpO2 - 96 - - 98 -   Weight 109 lb 119 lb 116 lb - 121 lb -   Height 5' 0\" 5' 0\" 5' 0\" - 5' 0\" -   BMI (wt*703/ht~2) 21.29 kg/m2 23.24 kg/m2 22.65 kg/m2 - 23.63 kg/m2 -       Family History   Problem Relation Age of Onset    Other Mother         thrombocytopenia    Other Father         skin cancer       Social History     Tobacco Use    Smoking status: Former Smoker    Smokeless tobacco: Never Used   Substance Use Topics    Alcohol use: Not on file      Current Outpatient Medications   Medication Sig Dispense Refill    pantoprazole (PROTONIX) 40 MG tablet Take 40 mg by mouth daily      ondansetron (ZOFRAN ODT) 4 MG disintegrating tablet Take 1 tablet by mouth every 8 hours as needed for Nausea or Vomiting 30 tablet 0    niacin 500 MG extended release capsule Take 1 capsule by mouth 2 times daily 180 capsule 3    cloNIDine (CATAPRES) 0.1 MG tablet TAKE 1 TABLET DAILY AS NEEDED FOR BLOOD PRESSURE OVER 170/90 90 tablet 3    allopurinol (ZYLOPRIM) 300 MG tablet Take 1 tablet by mouth daily 90 tablet 3    rizatriptan (MAXALT) 10 MG tablet Take 1 tablet by mouth once as needed for Migraine May repeat in 2 hours if needed 90 tablet 3    quinapril (ACCUPRIL) 40 MG tablet Take 1 tablet by mouth 2 times daily 180 tablet 3    levothyroxine (SYNTHROID) 50 MCG tablet Take 1 tablet by mouth Daily 90 tablet 3    cycloSPORINE (RESTASIS) 0.05 % ophthalmic emulsion 1 drop 2 times daily      Multiple Vitamins-Minerals (THERAPEUTIC MULTIVITAMIN-MINERALS) tablet Take 1 tablet by mouth daily      Acetaminophen (TYLENOL ARTHRITIS PAIN PO) Take by mouth      timolol (TIMOPTIC) 0.5 % ophthalmic solution 1 drop 2 times daily      aspirin 325 MG tablet Take 325 mg by mouth daily      bimatoprost (LUMIGAN) 0.01 % SOLN ophthalmic drops Place 1 drop into both eyes nightly      colchicine (COLCRYS) 0.6 MG tablet Take 0.6 mg by mouth 2 times daily as needed for Pain      Glucosamine-Chondroit-Vit C-Mn (GLUCOSAMINE CHONDR 1500 COMPLX) CAPS Take by mouth      Potassium Gluconate 550 MG TABS Take by mouth       No current facility-administered medications for this visit.       Allergies   Allergen Reactions    Biaxin [Clarithromycin]     Vioxx [Rofecoxib]        Health Maintenance   Topic Date Due    Shingles Vaccine (2 of 3) 05/14/2020 (Originally 10/28/2015)  Flu vaccine (Season Ended) 09/01/2019    TSH testing  11/05/2019    Potassium monitoring  05/15/2020    Creatinine monitoring  05/15/2020    DTaP/Tdap/Td vaccine (2 - Td) 12/18/2024    Pneumococcal 65+ years Vaccine  Completed       Subjective:      Review of Systems   Constitutional: Negative for chills and fever. HENT: Negative for ear pain, hearing loss, rhinorrhea and voice change. Eyes: Negative for photophobia and visual disturbance. Respiratory: Negative for cough and shortness of breath. Cardiovascular: Negative for chest pain and palpitations. Gastrointestinal: Negative for nausea and vomiting. Endocrine: Negative. Negative for cold intolerance and heat intolerance. Genitourinary: Negative for difficulty urinating and flank pain. Musculoskeletal: Negative for back pain and neck pain. Skin: Negative for color change and rash. Allergic/Immunologic: Negative for environmental allergies and food allergies. Neurological: Negative for dizziness, speech difficulty and headaches. Hematological: Does not bruise/bleed easily. Psychiatric/Behavioral: Negative for sleep disturbance and suicidal ideas. Objective:     Physical Exam   Constitutional: She is oriented to person, place, and time. She appears well-developed and well-nourished. HENT:   Head: Atraumatic. Right Ear: External ear normal.   Left Ear: External ear normal.   Nose: Nose normal.   Mouth/Throat: Oropharynx is clear and moist.   Eyes: Pupils are equal, round, and reactive to light. Conjunctivae are normal.   Neck: Normal range of motion. Neck supple. Cardiovascular: Normal rate, regular rhythm, S1 normal, S2 normal and normal heart sounds. Pulmonary/Chest: Effort normal and breath sounds normal.   Abdominal: Soft. Bowel sounds are normal.   Musculoskeletal: Normal range of motion. Neurological: She is alert and oriented to person, place, and time. Skin: Skin is warm and dry.    Psychiatric: She has

## 2019-05-15 ENCOUNTER — TELEPHONE (OUTPATIENT)
Dept: INTERNAL MEDICINE | Age: 84
End: 2019-05-15

## 2019-05-15 DIAGNOSIS — R79.89 ELEVATED LIVER FUNCTION TESTS: ICD-10-CM

## 2019-05-15 DIAGNOSIS — R79.89 ELEVATED LIVER FUNCTION TESTS: Primary | ICD-10-CM

## 2019-05-15 LAB
ALBUMIN SERPL-MCNC: 3.6 G/DL (ref 3.5–5.2)
ALP BLD-CCNC: 115 U/L (ref 35–104)
ALT SERPL-CCNC: 38 U/L (ref 5–33)
ANION GAP SERPL CALCULATED.3IONS-SCNC: 14 MMOL/L (ref 7–19)
AST SERPL-CCNC: 47 U/L (ref 5–32)
BILIRUB SERPL-MCNC: 0.3 MG/DL (ref 0.2–1.2)
BUN BLDV-MCNC: 39 MG/DL (ref 8–23)
CALCIUM SERPL-MCNC: 9.1 MG/DL (ref 8.2–9.6)
CHLORIDE BLD-SCNC: 101 MMOL/L (ref 98–111)
CO2: 21 MMOL/L (ref 22–29)
CREAT SERPL-MCNC: 1.8 MG/DL (ref 0.5–0.9)
GFR NON-AFRICAN AMERICAN: 26
GLUCOSE BLD-MCNC: 110 MG/DL (ref 74–109)
POTASSIUM SERPL-SCNC: 5.4 MMOL/L (ref 3.5–5)
SODIUM BLD-SCNC: 136 MMOL/L (ref 136–145)
TOTAL PROTEIN: 6.3 G/DL (ref 6.6–8.7)

## 2019-05-16 LAB — URINE CULTURE, ROUTINE: NORMAL

## 2019-05-20 ENCOUNTER — OFFICE VISIT (OUTPATIENT)
Dept: INTERNAL MEDICINE | Age: 84
End: 2019-05-20
Payer: MEDICARE

## 2019-05-20 VITALS
BODY MASS INDEX: 21.4 KG/M2 | DIASTOLIC BLOOD PRESSURE: 70 MMHG | SYSTOLIC BLOOD PRESSURE: 100 MMHG | WEIGHT: 109 LBS | HEART RATE: 72 BPM | HEIGHT: 60 IN

## 2019-05-20 DIAGNOSIS — N18.30 CHRONIC KIDNEY DISEASE, STAGE III (MODERATE) (HCC): ICD-10-CM

## 2019-05-20 DIAGNOSIS — I11.9 HYPERTENSIVE HEART DISEASE WITHOUT HEART FAILURE: ICD-10-CM

## 2019-05-20 DIAGNOSIS — R19.7 DIARRHEA OF PRESUMED INFECTIOUS ORIGIN: Primary | ICD-10-CM

## 2019-05-20 PROCEDURE — 1123F ACP DISCUSS/DSCN MKR DOCD: CPT | Performed by: INTERNAL MEDICINE

## 2019-05-20 PROCEDURE — 1036F TOBACCO NON-USER: CPT | Performed by: INTERNAL MEDICINE

## 2019-05-20 PROCEDURE — G8420 CALC BMI NORM PARAMETERS: HCPCS | Performed by: INTERNAL MEDICINE

## 2019-05-20 PROCEDURE — 4040F PNEUMOC VAC/ADMIN/RCVD: CPT | Performed by: INTERNAL MEDICINE

## 2019-05-20 PROCEDURE — G8427 DOCREV CUR MEDS BY ELIG CLIN: HCPCS | Performed by: INTERNAL MEDICINE

## 2019-05-20 PROCEDURE — 99214 OFFICE O/P EST MOD 30 MIN: CPT | Performed by: INTERNAL MEDICINE

## 2019-05-20 PROCEDURE — 1090F PRES/ABSN URINE INCON ASSESS: CPT | Performed by: INTERNAL MEDICINE

## 2019-05-20 NOTE — PROGRESS NOTES
Chief Complaint   Patient presents with    3 Month Follow-Up     she says that fingers feel numb, unable to get oxygen level    Chronic Kidney Disease    Hyperlipidemia       HPI: Patient is here today with her family she is not feeling well she's having lots of diarrhea she's having diarrhea and had a UTI and sepsis ended up in the hospital after she left the hospital we saw in the office she was better but sometime around Easter she started having severe diarrhea again. Diarrhea is quite frequent sounds like it's watery to loose not much pain or cramping she's weak she's tired she doesn't feel that well tends to have diarrhea post eating some bloating no upper abdominal pain. No fevers or chills no nausea or vomiting appetite is not good. She's losing some weight. 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     Chronic kidney disease     Eczema     Fibrocystic breast disease (FCBD)     Fibromyalgia     Hypertensive heart disease     Migraine     Mixed hyperlipidemia     Osteoarthritis     Skin cancer     Dr Jalyn Cuenca, rt ant tibial region, 9/07, 1/16 Dr Roberto Roque       Past Surgical History:   Procedure Laterality Date    APPENDECTOMY      COLONOSCOPY      HYSTERECTOMY         Family History   Problem Relation Age of Onset    Other Mother         thrombocytopenia    Other Father         skin cancer       Social History     Socioeconomic History    Marital status:       Spouse name: Not on file    Number of children: Not on file    Years of education: Not on file    Highest education level: Not on file   Occupational History    Not on file   Social Needs    Financial resource strain: Not on file    Food insecurity:     Worry: Not on file     Inability: Not on file    Transportation needs:     Medical: Not on file     Non-medical: Not on file   Tobacco Use    Smoking status: Former Smoker    Smokeless tobacco: Never Used   Substance and Sexual Activity  Alcohol use: Not on file    Drug use: Not on file    Sexual activity: Not on file   Lifestyle    Physical activity:     Days per week: Not on file     Minutes per session: Not on file    Stress: Not on file   Relationships    Social connections:     Talks on phone: Not on file     Gets together: Not on file     Attends Christian service: Not on file     Active member of club or organization: Not on file     Attends meetings of clubs or organizations: Not on file     Relationship status: Not on file    Intimate partner violence:     Fear of current or ex partner: Not on file     Emotionally abused: Not on file     Physically abused: Not on file     Forced sexual activity: Not on file   Other Topics Concern    Not on file   Social History Narrative    Not on file       Allergies   Allergen Reactions    Biaxin [Clarithromycin]     Vioxx [Rofecoxib]        Current Outpatient Medications   Medication Sig Dispense Refill    colestipol (COLESTID) 1 g tablet Take 2 tablets at lunch 60 tablet 3    pantoprazole (PROTONIX) 40 MG tablet Take 40 mg by mouth daily      ondansetron (ZOFRAN ODT) 4 MG disintegrating tablet Take 1 tablet by mouth every 8 hours as needed for Nausea or Vomiting 30 tablet 0    niacin 500 MG extended release capsule Take 1 capsule by mouth 2 times daily 180 capsule 3    cloNIDine (CATAPRES) 0.1 MG tablet TAKE 1 TABLET DAILY AS NEEDED FOR BLOOD PRESSURE OVER 170/90 90 tablet 3    allopurinol (ZYLOPRIM) 300 MG tablet Take 1 tablet by mouth daily 90 tablet 3    rizatriptan (MAXALT) 10 MG tablet Take 1 tablet by mouth once as needed for Migraine May repeat in 2 hours if needed 90 tablet 3    quinapril (ACCUPRIL) 40 MG tablet Take 1 tablet by mouth 2 times daily 180 tablet 3    levothyroxine (SYNTHROID) 50 MCG tablet Take 1 tablet by mouth Daily 90 tablet 3    cycloSPORINE (RESTASIS) 0.05 % ophthalmic emulsion 1 drop 2 times daily      Multiple Vitamins-Minerals (THERAPEUTIC MULTIVITAMIN-MINERALS) tablet Take 1 tablet by mouth daily      Acetaminophen (TYLENOL ARTHRITIS PAIN PO) Take by mouth      timolol (TIMOPTIC) 0.5 % ophthalmic solution 1 drop 2 times daily      aspirin 325 MG tablet Take 325 mg by mouth daily      bimatoprost (LUMIGAN) 0.01 % SOLN ophthalmic drops Place 1 drop into both eyes nightly      colchicine (COLCRYS) 0.6 MG tablet Take 0.6 mg by mouth 2 times daily as needed for Pain      Glucosamine-Chondroit-Vit C-Mn (GLUCOSAMINE CHONDR 1500 COMPLX) CAPS Take by mouth      Potassium Gluconate 550 MG TABS Take by mouth       No current facility-administered medications for this visit. Review of Systems   Constitutional: Positive for appetite change and fatigue. Negative for chills and fever. HENT: Negative for congestion. Eyes: Negative for discharge and redness. Respiratory: Negative for cough and shortness of breath. Cardiovascular: Negative for chest pain, palpitations and leg swelling. Gastrointestinal: Positive for abdominal distention and diarrhea. Negative for constipation and nausea. Genitourinary: Positive for frequency and urgency. Negative for dysuria. Skin: Negative for rash and wound. Neurological: Positive for weakness. Negative for dizziness, light-headedness and headaches. Psychiatric/Behavioral: Negative for dysphoric mood and sleep disturbance. The patient is not nervous/anxious.         /70 (Site: Left Upper Arm)   Pulse 72   Ht 5' (1.524 m)   Wt 109 lb (49.4 kg)   BMI 21.29 kg/m²   BP Readings from Last 7 Encounters:   05/20/19 100/70   05/14/19 (!) 146/76   03/14/19 96/70   03/01/19 (!) 60/40   02/28/19 108/68   11/08/18 112/80   05/08/18 (!) 130/90     Wt Readings from Last 7 Encounters:   05/20/19 109 lb (49.4 kg)   05/14/19 109 lb (49.4 kg)   03/14/19 119 lb (54 kg)   03/01/19 116 lb (52.6 kg)   11/08/18 121 lb (54.9 kg)   05/08/18 117 lb (53.1 kg)     BMI Readings from Last 7 Encounters:   05/20/19 21.29 kg/m²   05/14/19 21.29 kg/m²   03/14/19 23.24 kg/m²   03/01/19 22.65 kg/m²   11/08/18 23.63 kg/m²   05/08/18 22.85 kg/m²     Resp Readings from Last 7 Encounters:   No data found for Resp       Physical Exam tired and weak appearing sclera anicteric heart S1-S2 lungs clear abdomen soft little tympanitic little distended and slightly hyperactive bowel sounds nontender mood tired. Results for orders placed or performed in visit on 05/15/19   Comprehensive Metabolic Panel   Result Value Ref Range    Sodium 136 136 - 145 mmol/L    Potassium 5.4 (H) 3.5 - 5.0 mmol/L    Chloride 101 98 - 111 mmol/L    CO2 21 (L) 22 - 29 mmol/L    Anion Gap 14 7 - 19 mmol/L    Glucose 110 (H) 74 - 109 mg/dL    BUN 39 (H) 8 - 23 mg/dL    CREATININE 1.8 (H) 0.5 - 0.9 mg/dL    GFR Non-African American 26 (A) >60    Calcium 9.1 8.2 - 9.6 mg/dL    Total Protein 6.3 (L) 6.6 - 8.7 g/dL    Alb 3.6 3.5 - 5.2 g/dL    Total Bilirubin 0.3 0.2 - 1.2 mg/dL    Alkaline Phosphatase 115 (H) 35 - 104 U/L    ALT 38 (H) 5 - 33 U/L    AST 47 (H) 5 - 32 U/L       ASSESSMENT/ PLAN:  1. Diarrhea of presumed infectious origin  Check labs to reassess for C. diff gallbladder evaluation may be gallbladder related discussed Colestid consider trial of Colestid if not improving  - CBC; Future  - Comprehensive Metabolic Panel; Future  - TSH without Reflex; Future  - Sedimentation Rate; Future  - C-Reactive Protein; Future  - Clostridium Difficile Toxin/Antigen; Future  - US GALLBLADDER RUQ; Future  - NM HEPATOBILIARY; Future    2. Chronic kidney disease, stage III (moderate) (HCC)  Recent worsening of renal function had acute renal insufficiency during recent hospitalization volume depletion and diarrhea contributing    3.  Hypertensive heart disease without heart failure  Stable blood pressure needs to be followed with diarrhea running lower monitor closely

## 2019-05-21 ENCOUNTER — TRANSCRIBE ORDERS (OUTPATIENT)
Dept: ADMINISTRATIVE | Facility: HOSPITAL | Age: 84
End: 2019-05-21

## 2019-05-21 DIAGNOSIS — R19.7 DIARRHEA OF PRESUMED INFECTIOUS ORIGIN: Primary | ICD-10-CM

## 2019-05-22 DIAGNOSIS — R19.7 DIARRHEA OF PRESUMED INFECTIOUS ORIGIN: ICD-10-CM

## 2019-05-22 LAB
ALBUMIN SERPL-MCNC: 3.4 G/DL (ref 3.5–5.2)
ALP BLD-CCNC: 118 U/L (ref 35–104)
ALT SERPL-CCNC: 37 U/L (ref 5–33)
ANION GAP SERPL CALCULATED.3IONS-SCNC: 14 MMOL/L (ref 7–19)
AST SERPL-CCNC: 66 U/L (ref 5–32)
BILIRUB SERPL-MCNC: 0.3 MG/DL (ref 0.2–1.2)
BUN BLDV-MCNC: 38 MG/DL (ref 8–23)
C DIFFICILE TOXIN, EIA: NORMAL
C-REACTIVE PROTEIN: 0.06 MG/DL (ref 0–0.5)
CALCIUM SERPL-MCNC: 9.4 MG/DL (ref 8.2–9.6)
CHLORIDE BLD-SCNC: 102 MMOL/L (ref 98–111)
CO2: 21 MMOL/L (ref 22–29)
CREAT SERPL-MCNC: 1.9 MG/DL (ref 0.5–0.9)
GFR NON-AFRICAN AMERICAN: 25
GLUCOSE BLD-MCNC: 121 MG/DL (ref 74–109)
HCT VFR BLD CALC: 35.5 % (ref 37–47)
HEMOGLOBIN: 11.5 G/DL (ref 12–16)
MCH RBC QN AUTO: 32.3 PG (ref 27–31)
MCHC RBC AUTO-ENTMCNC: 32.4 G/DL (ref 33–37)
MCV RBC AUTO: 99.7 FL (ref 81–99)
PDW BLD-RTO: 14.1 % (ref 11.5–14.5)
PLATELET # BLD: 157 K/UL (ref 130–400)
PMV BLD AUTO: 9.4 FL (ref 9.4–12.3)
POTASSIUM SERPL-SCNC: 5.2 MMOL/L (ref 3.5–5)
RBC # BLD: 3.56 M/UL (ref 4.2–5.4)
SEDIMENTATION RATE, ERYTHROCYTE: 13 MM/HR (ref 0–25)
SODIUM BLD-SCNC: 137 MMOL/L (ref 136–145)
TOTAL PROTEIN: 6.1 G/DL (ref 6.6–8.7)
TSH SERPL DL<=0.05 MIU/L-ACNC: 3.78 UIU/ML (ref 0.27–4.2)
WBC # BLD: 6 K/UL (ref 4.8–10.8)

## 2019-05-24 RX ORDER — MONTELUKAST SODIUM 4 MG/1
TABLET, CHEWABLE ORAL
Qty: 60 TABLET | Refills: 3 | Status: SHIPPED | OUTPATIENT
Start: 2019-05-24 | End: 2019-06-19 | Stop reason: SDUPTHER

## 2019-06-03 ENCOUNTER — HOSPITAL ENCOUNTER (OUTPATIENT)
Dept: ULTRASOUND IMAGING | Facility: HOSPITAL | Age: 84
Discharge: HOME | End: 2019-06-03
Admitting: INTERNAL MEDICINE

## 2019-06-03 ENCOUNTER — HOSPITAL ENCOUNTER (OUTPATIENT)
Dept: NUCLEAR MEDICINE | Facility: HOSPITAL | Age: 84
Discharge: HOME | End: 2019-06-03

## 2019-06-03 DIAGNOSIS — R19.7 DIARRHEA OF PRESUMED INFECTIOUS ORIGIN: ICD-10-CM

## 2019-06-03 PROCEDURE — 78226 HEPATOBILIARY SYSTEM IMAGING: CPT

## 2019-06-03 PROCEDURE — 76705 ECHO EXAM OF ABDOMEN: CPT

## 2019-06-03 PROCEDURE — 0 TECHNETIUM TC 99M MEBROFENIN KIT: Performed by: INTERNAL MEDICINE

## 2019-06-03 PROCEDURE — A9537 TC99M MEBROFENIN: HCPCS | Performed by: INTERNAL MEDICINE

## 2019-06-03 RX ORDER — KIT FOR THE PREPARATION OF TECHNETIUM TC 99M MEBROFENIN 45 MG/10ML
1 INJECTION, POWDER, LYOPHILIZED, FOR SOLUTION INTRAVENOUS
Status: COMPLETED | OUTPATIENT
Start: 2019-06-03 | End: 2019-06-03

## 2019-06-03 RX ADMIN — MEBROFENIN 1 DOSE: 45 INJECTION, POWDER, LYOPHILIZED, FOR SOLUTION INTRAVENOUS at 08:45

## 2019-06-03 ASSESSMENT — ENCOUNTER SYMPTOMS
ABDOMINAL DISTENTION: 1
SHORTNESS OF BREATH: 0
DIARRHEA: 1
NAUSEA: 0
CONSTIPATION: 0
EYE REDNESS: 0
EYE DISCHARGE: 0
COUGH: 0

## 2019-06-05 ENCOUNTER — OFFICE VISIT (OUTPATIENT)
Dept: INTERNAL MEDICINE | Age: 84
End: 2019-06-05
Payer: MEDICARE

## 2019-06-05 VITALS
SYSTOLIC BLOOD PRESSURE: 98 MMHG | HEART RATE: 72 BPM | DIASTOLIC BLOOD PRESSURE: 64 MMHG | BODY MASS INDEX: 21.2 KG/M2 | OXYGEN SATURATION: 96 % | RESPIRATION RATE: 20 BRPM | HEIGHT: 60 IN | WEIGHT: 108 LBS

## 2019-06-05 DIAGNOSIS — E03.9 HYPOTHYROIDISM, UNSPECIFIED TYPE: ICD-10-CM

## 2019-06-05 DIAGNOSIS — N18.30 CHRONIC KIDNEY DISEASE, STAGE III (MODERATE) (HCC): ICD-10-CM

## 2019-06-05 DIAGNOSIS — R19.7 DIARRHEA DUE TO MALABSORPTION: Primary | ICD-10-CM

## 2019-06-05 DIAGNOSIS — K90.9 DIARRHEA DUE TO MALABSORPTION: Primary | ICD-10-CM

## 2019-06-05 DIAGNOSIS — N18.9 ANEMIA DUE TO CHRONIC KIDNEY DISEASE, UNSPECIFIED CKD STAGE: ICD-10-CM

## 2019-06-05 DIAGNOSIS — D63.1 ANEMIA DUE TO CHRONIC KIDNEY DISEASE, UNSPECIFIED CKD STAGE: ICD-10-CM

## 2019-06-05 PROCEDURE — 1123F ACP DISCUSS/DSCN MKR DOCD: CPT | Performed by: INTERNAL MEDICINE

## 2019-06-05 PROCEDURE — 1036F TOBACCO NON-USER: CPT | Performed by: INTERNAL MEDICINE

## 2019-06-05 PROCEDURE — G8420 CALC BMI NORM PARAMETERS: HCPCS | Performed by: INTERNAL MEDICINE

## 2019-06-05 PROCEDURE — G8427 DOCREV CUR MEDS BY ELIG CLIN: HCPCS | Performed by: INTERNAL MEDICINE

## 2019-06-05 PROCEDURE — 1090F PRES/ABSN URINE INCON ASSESS: CPT | Performed by: INTERNAL MEDICINE

## 2019-06-05 PROCEDURE — 99214 OFFICE O/P EST MOD 30 MIN: CPT | Performed by: INTERNAL MEDICINE

## 2019-06-05 PROCEDURE — 4040F PNEUMOC VAC/ADMIN/RCVD: CPT | Performed by: INTERNAL MEDICINE

## 2019-06-05 NOTE — PROGRESS NOTES
Take 1 tablet by mouth daily 90 tablet 3    colestipol (COLESTID) 1 g tablet Take 2 tablets at lunch 60 tablet 3    ondansetron (ZOFRAN ODT) 4 MG disintegrating tablet Take 1 tablet by mouth every 8 hours as needed for Nausea or Vomiting 30 tablet 0    niacin 500 MG extended release capsule Take 1 capsule by mouth 2 times daily 180 capsule 3    cloNIDine (CATAPRES) 0.1 MG tablet TAKE 1 TABLET DAILY AS NEEDED FOR BLOOD PRESSURE OVER 170/90 90 tablet 3    rizatriptan (MAXALT) 10 MG tablet Take 1 tablet by mouth once as needed for Migraine May repeat in 2 hours if needed 90 tablet 3    quinapril (ACCUPRIL) 40 MG tablet Take 1 tablet by mouth 2 times daily 180 tablet 3    levothyroxine (SYNTHROID) 50 MCG tablet Take 1 tablet by mouth Daily 90 tablet 3    cycloSPORINE (RESTASIS) 0.05 % ophthalmic emulsion 1 drop 2 times daily      Multiple Vitamins-Minerals (THERAPEUTIC MULTIVITAMIN-MINERALS) tablet Take 1 tablet by mouth daily      Acetaminophen (TYLENOL ARTHRITIS PAIN PO) Take by mouth      timolol (TIMOPTIC) 0.5 % ophthalmic solution 1 drop 2 times daily      aspirin 325 MG tablet Take 325 mg by mouth daily      bimatoprost (LUMIGAN) 0.01 % SOLN ophthalmic drops Place 1 drop into both eyes nightly      colchicine (COLCRYS) 0.6 MG tablet Take 0.6 mg by mouth 2 times daily as needed for Pain      Glucosamine-Chondroit-Vit C-Mn (GLUCOSAMINE CHONDR 1500 COMPLX) CAPS Take by mouth      Potassium Gluconate 550 MG TABS Take by mouth       No current facility-administered medications for this visit. Review of Systems   Constitutional: Positive for fatigue. Negative for chills and fever. HENT: Negative for congestion, sore throat and trouble swallowing. Eyes: Negative for discharge and redness. Respiratory: Negative for cough, chest tightness and shortness of breath. Cardiovascular: Negative for chest pain, palpitations and leg swelling.    Gastrointestinal: Positive for abdominal distention, diarrhea and nausea. Genitourinary: Negative for dysuria, frequency and urgency. Musculoskeletal: Positive for arthralgias. Neurological: Negative for dizziness, light-headedness and headaches. Hematological: Negative for adenopathy. Psychiatric/Behavioral: Negative for confusion and dysphoric mood. BP 98/64   Pulse 72   Resp 20   Ht 5' (1.524 m)   Wt 108 lb (49 kg)   SpO2 96%   BMI 21.09 kg/m²   BP Readings from Last 7 Encounters:   06/05/19 98/64   05/20/19 100/70   05/14/19 (!) 146/76   03/14/19 96/70   03/01/19 (!) 60/40   02/28/19 108/68   11/08/18 112/80     Wt Readings from Last 7 Encounters:   06/05/19 108 lb (49 kg)   05/20/19 109 lb (49.4 kg)   05/14/19 109 lb (49.4 kg)   03/14/19 119 lb (54 kg)   03/01/19 116 lb (52.6 kg)   11/08/18 121 lb (54.9 kg)   05/08/18 117 lb (53.1 kg)     BMI Readings from Last 7 Encounters:   06/05/19 21.09 kg/m²   05/20/19 21.29 kg/m²   05/14/19 21.29 kg/m²   03/14/19 23.24 kg/m²   03/01/19 22.65 kg/m²   11/08/18 23.63 kg/m²   05/08/18 22.85 kg/m²     Resp Readings from Last 7 Encounters:   06/05/19 20       Physical Exam mild alopecia neck is supple sclera anicteric no supra clavicular cervical lymphadenopathy heart S1-S2 lungs clear abdomen soft nontender no hepatosplenomegaly extremities without clubbing cyanosis edema patient does not have jaundice.     Results for orders placed or performed in visit on 05/22/19   Clostridium Difficile Toxin/Antigen   Result Value Ref Range    C difficile Toxin, EIA       Result: Negative for Toxigenic C. difficile by EIA  Normal Range: Negative     CBC   Result Value Ref Range    WBC 6.0 4.8 - 10.8 K/uL    RBC 3.56 (L) 4.20 - 5.40 M/uL    Hemoglobin 11.5 (L) 12.0 - 16.0 g/dL    Hematocrit 35.5 (L) 37.0 - 47.0 %    MCV 99.7 (H) 81.0 - 99.0 fL    MCH 32.3 (H) 27.0 - 31.0 pg    MCHC 32.4 (L) 33.0 - 37.0 g/dL    RDW 14.1 11.5 - 14.5 %    Platelets 639 404 - 169 K/uL    MPV 9.4 9.4 - 12.3 fL   Comprehensive Metabolic Panel

## 2019-06-07 DIAGNOSIS — D63.1 ANEMIA IN CHRONIC KIDNEY DISEASE, UNSPECIFIED CKD STAGE: ICD-10-CM

## 2019-06-07 DIAGNOSIS — N18.9 ANEMIA IN CHRONIC KIDNEY DISEASE, UNSPECIFIED CKD STAGE: ICD-10-CM

## 2019-06-07 DIAGNOSIS — M10.9 ARTHRITIS DUE TO GOUT: ICD-10-CM

## 2019-06-07 DIAGNOSIS — E03.9 HYPOTHYROIDISM, UNSPECIFIED TYPE: ICD-10-CM

## 2019-06-07 DIAGNOSIS — N18.9 CHRONIC KIDNEY DISEASE, UNSPECIFIED CKD STAGE: ICD-10-CM

## 2019-06-07 DIAGNOSIS — I11.9 HYPERTENSIVE HEART DISEASE WITHOUT HEART FAILURE: ICD-10-CM

## 2019-06-07 RX ORDER — ALLOPURINOL 300 MG/1
300 TABLET ORAL DAILY
Qty: 90 TABLET | Refills: 3 | Status: SHIPPED | OUTPATIENT
Start: 2019-06-07 | End: 2020-05-14

## 2019-06-07 RX ORDER — PANTOPRAZOLE SODIUM 40 MG/1
40 TABLET, DELAYED RELEASE ORAL DAILY
Qty: 90 TABLET | Refills: 3 | Status: SHIPPED | OUTPATIENT
Start: 2019-06-07 | End: 2021-08-12 | Stop reason: ALTCHOICE

## 2019-06-09 PROBLEM — K90.9 DIARRHEA DUE TO MALABSORPTION: Status: ACTIVE | Noted: 2019-03-24

## 2019-06-09 ASSESSMENT — ENCOUNTER SYMPTOMS
NAUSEA: 1
DIARRHEA: 1
COUGH: 0
TROUBLE SWALLOWING: 0
EYE REDNESS: 0
SHORTNESS OF BREATH: 0
EYE DISCHARGE: 0
ABDOMINAL DISTENTION: 1
CHEST TIGHTNESS: 0
SORE THROAT: 0

## 2019-06-19 RX ORDER — MONTELUKAST SODIUM 4 MG/1
TABLET, CHEWABLE ORAL
Qty: 180 TABLET | Refills: 3 | Status: SHIPPED | OUTPATIENT
Start: 2019-06-19 | End: 2019-07-08

## 2019-06-20 DIAGNOSIS — M10.9 ARTHRITIS DUE TO GOUT: ICD-10-CM

## 2019-06-20 DIAGNOSIS — D63.1 ANEMIA IN CHRONIC KIDNEY DISEASE, UNSPECIFIED CKD STAGE: ICD-10-CM

## 2019-06-20 DIAGNOSIS — I11.9 HYPERTENSIVE HEART DISEASE WITHOUT HEART FAILURE: ICD-10-CM

## 2019-06-20 DIAGNOSIS — E03.9 HYPOTHYROIDISM, UNSPECIFIED TYPE: ICD-10-CM

## 2019-06-20 DIAGNOSIS — N18.9 ANEMIA IN CHRONIC KIDNEY DISEASE, UNSPECIFIED CKD STAGE: ICD-10-CM

## 2019-06-20 DIAGNOSIS — N18.9 CHRONIC KIDNEY DISEASE, UNSPECIFIED CKD STAGE: ICD-10-CM

## 2019-06-20 RX ORDER — QUINAPRIL 40 MG/1
40 TABLET ORAL 2 TIMES DAILY
Qty: 180 TABLET | Refills: 3 | Status: SHIPPED | OUTPATIENT
Start: 2019-06-20 | End: 2019-07-08

## 2019-06-20 RX ORDER — LEVOTHYROXINE SODIUM 0.05 MG/1
50 TABLET ORAL DAILY
Qty: 90 TABLET | Refills: 3 | Status: SHIPPED | OUTPATIENT
Start: 2019-06-20 | End: 2020-05-14

## 2019-06-27 ENCOUNTER — HOSPITAL ENCOUNTER (OUTPATIENT)
Facility: HOSPITAL | Age: 84
Setting detail: OBSERVATION
LOS: 1 days | Discharge: HOME HEALTH | End: 2019-06-29
Attending: FAMILY MEDICINE | Admitting: FAMILY MEDICINE

## 2019-06-27 ENCOUNTER — HOSPITAL ENCOUNTER (OUTPATIENT)
Dept: CT IMAGING | Age: 84
Discharge: HOME | End: 2019-06-27
Payer: MEDICARE

## 2019-06-27 ENCOUNTER — TELEPHONE (OUTPATIENT)
Dept: INTERNAL MEDICINE | Age: 84
End: 2019-06-27

## 2019-06-27 ENCOUNTER — OFFICE VISIT (OUTPATIENT)
Dept: INTERNAL MEDICINE | Age: 84
End: 2019-06-27
Payer: MEDICARE

## 2019-06-27 VITALS
HEART RATE: 75 BPM | OXYGEN SATURATION: 96 % | BODY MASS INDEX: 21.01 KG/M2 | WEIGHT: 107 LBS | SYSTOLIC BLOOD PRESSURE: 90 MMHG | DIASTOLIC BLOOD PRESSURE: 48 MMHG | HEIGHT: 60 IN

## 2019-06-27 DIAGNOSIS — R07.9 CHEST PAIN, UNSPECIFIED TYPE: ICD-10-CM

## 2019-06-27 DIAGNOSIS — N18.30 STAGE 3 CHRONIC KIDNEY DISEASE (HCC): ICD-10-CM

## 2019-06-27 DIAGNOSIS — E03.9 HYPOTHYROIDISM (ACQUIRED): ICD-10-CM

## 2019-06-27 DIAGNOSIS — R82.71 ASYMPTOMATIC BACTERIURIA: ICD-10-CM

## 2019-06-27 DIAGNOSIS — N17.9 ACUTE RENAL FAILURE, UNSPECIFIED ACUTE RENAL FAILURE TYPE (HCC): ICD-10-CM

## 2019-06-27 DIAGNOSIS — R19.7 DIARRHEA OF PRESUMED INFECTIOUS ORIGIN: ICD-10-CM

## 2019-06-27 DIAGNOSIS — D64.9 ANEMIA, UNSPECIFIED TYPE: ICD-10-CM

## 2019-06-27 DIAGNOSIS — Z78.9 IMPAIRED MOBILITY AND ADLS: ICD-10-CM

## 2019-06-27 DIAGNOSIS — R19.7 DIARRHEA DUE TO MALABSORPTION: ICD-10-CM

## 2019-06-27 DIAGNOSIS — Z74.09 IMPAIRED MOBILITY: ICD-10-CM

## 2019-06-27 DIAGNOSIS — E46 MALNUTRITION, UNSPECIFIED TYPE (HCC): ICD-10-CM

## 2019-06-27 DIAGNOSIS — E86.0 DEHYDRATION: ICD-10-CM

## 2019-06-27 DIAGNOSIS — J06.9 VIRAL UPPER RESPIRATORY TRACT INFECTION: ICD-10-CM

## 2019-06-27 DIAGNOSIS — K90.9 DIARRHEA DUE TO MALABSORPTION: ICD-10-CM

## 2019-06-27 DIAGNOSIS — N18.9 ANEMIA DUE TO CHRONIC KIDNEY DISEASE, UNSPECIFIED CKD STAGE: ICD-10-CM

## 2019-06-27 DIAGNOSIS — R22.0 FACIAL SWELLING: ICD-10-CM

## 2019-06-27 DIAGNOSIS — D63.1 ANEMIA DUE TO CHRONIC KIDNEY DISEASE, UNSPECIFIED CKD STAGE: ICD-10-CM

## 2019-06-27 DIAGNOSIS — R10.13 EPIGASTRIC PAIN: Primary | ICD-10-CM

## 2019-06-27 DIAGNOSIS — Z74.09 IMPAIRED MOBILITY AND ADLS: ICD-10-CM

## 2019-06-27 DIAGNOSIS — R74.01 TRANSAMINITIS: ICD-10-CM

## 2019-06-27 DIAGNOSIS — R41.82 ALTERED MENTAL STATUS, UNSPECIFIED ALTERED MENTAL STATUS TYPE: ICD-10-CM

## 2019-06-27 DIAGNOSIS — E87.1 HYPONATREMIA: ICD-10-CM

## 2019-06-27 DIAGNOSIS — R13.10 DYSPHAGIA, UNSPECIFIED TYPE: Primary | ICD-10-CM

## 2019-06-27 LAB
ALBUMIN SERPL-MCNC: 3 G/DL (ref 3.5–5)
ALBUMIN SERPL-MCNC: 3.2 G/DL (ref 3.5–5.2)
ALBUMIN/GLOB SERPL: 1.2 G/DL (ref 1.1–2.5)
ALP BLD-CCNC: 203 U/L (ref 35–104)
ALP SERPL-CCNC: 157 U/L (ref 24–120)
ALT SERPL W P-5'-P-CCNC: 42 U/L (ref 0–54)
ALT SERPL-CCNC: 31 U/L (ref 5–33)
ANION GAP SERPL CALCULATED.3IONS-SCNC: 14 MMOL/L (ref 7–19)
ANION GAP SERPL CALCULATED.3IONS-SCNC: 9 MMOL/L (ref 4–13)
AST SERPL-CCNC: 57 U/L (ref 5–32)
AST SERPL-CCNC: 61 U/L (ref 7–45)
BACTERIA UR QL AUTO: ABNORMAL /HPF
BASOPHILS # BLD AUTO: 0.04 10*3/MM3 (ref 0–0.2)
BASOPHILS NFR BLD AUTO: 0.5 % (ref 0–2)
BILIRUB SERPL-MCNC: 0.5 MG/DL (ref 0.1–1)
BILIRUB SERPL-MCNC: 0.5 MG/DL (ref 0.2–1.2)
BILIRUB UR QL STRIP: NEGATIVE
BUN BLD-MCNC: 43 MG/DL (ref 5–21)
BUN BLDV-MCNC: 44 MG/DL (ref 8–23)
BUN/CREAT SERPL: 20.7 (ref 7–25)
C-REACTIVE PROTEIN: 0.06 MG/DL (ref 0–0.5)
CALCIUM SERPL-MCNC: 8.7 MG/DL (ref 8.2–9.6)
CALCIUM SPEC-SCNC: 8.4 MG/DL (ref 8.4–10.4)
CHLORIDE BLD-SCNC: 97 MMOL/L (ref 98–111)
CHLORIDE SERPL-SCNC: 100 MMOL/L (ref 98–110)
CLARITY UR: CLEAR
CO2 SERPL-SCNC: 22 MMOL/L (ref 24–31)
CO2: 20 MMOL/L (ref 22–29)
COLOR UR: YELLOW
CREAT BLD-MCNC: 2.08 MG/DL (ref 0.5–1.4)
CREAT SERPL-MCNC: 2.3 MG/DL (ref 0.5–0.9)
DEPRECATED RDW RBC AUTO: 54.1 FL (ref 40–54)
EOSINOPHIL # BLD AUTO: 0.1 10*3/MM3 (ref 0–0.7)
EOSINOPHIL NFR BLD AUTO: 1.3 % (ref 0–4)
ERYTHROCYTE [DISTWIDTH] IN BLOOD BY AUTOMATED COUNT: 16.6 % (ref 12–15)
FERRITIN SERPL-MCNC: 413 NG/ML (ref 11.1–264)
GFR NON-AFRICAN AMERICAN: 20
GFR SERPL CREATININE-BSD FRML MDRD: 22 ML/MIN/1.73
GLOBULIN UR ELPH-MCNC: 2.6 GM/DL
GLUCOSE BLD-MCNC: 112 MG/DL (ref 70–100)
GLUCOSE BLD-MCNC: 124 MG/DL (ref 74–109)
GLUCOSE UR STRIP-MCNC: NEGATIVE MG/DL
HCT VFR BLD AUTO: 27.6 % (ref 37–47)
HCT VFR BLD CALC: 30.9 % (ref 37–47)
HEMOGLOBIN: 10.7 G/DL (ref 12–16)
HGB BLD-MCNC: 9.8 G/DL (ref 12–16)
HGB UR QL STRIP.AUTO: NEGATIVE
HYALINE CASTS UR QL AUTO: ABNORMAL /LPF
IMM GRANULOCYTES # BLD AUTO: 0.13 10*3/MM3 (ref 0–0.05)
IMM GRANULOCYTES NFR BLD AUTO: 1.7 % (ref 0–5)
IRON 24H UR-MRATE: 78 MCG/DL (ref 42–180)
IRON SATN MFR SERPL: 41 % (ref 20–45)
KETONES UR QL STRIP: NEGATIVE
LEUKOCYTE ESTERASE UR QL STRIP.AUTO: ABNORMAL
LYMPHOCYTES # BLD AUTO: 2.08 10*3/MM3 (ref 0.72–4.86)
LYMPHOCYTES NFR BLD AUTO: 27.5 % (ref 15–45)
MAGNESIUM SERPL-MCNC: 1.6 MG/DL (ref 1.4–2.2)
MCH RBC QN AUTO: 32.6 PG (ref 28–32)
MCH RBC QN AUTO: 33.5 PG (ref 27–31)
MCHC RBC AUTO-ENTMCNC: 34.6 G/DL (ref 33–37)
MCHC RBC AUTO-ENTMCNC: 35.5 G/DL (ref 33–36)
MCV RBC AUTO: 91.7 FL (ref 82–98)
MCV RBC AUTO: 96.9 FL (ref 81–99)
MONOCYTES # BLD AUTO: 0.99 10*3/MM3 (ref 0.19–1.3)
MONOCYTES NFR BLD AUTO: 13.1 % (ref 4–12)
NEUTROPHILS # BLD AUTO: 4.21 10*3/MM3 (ref 1.87–8.4)
NEUTROPHILS NFR BLD AUTO: 55.9 % (ref 39–78)
NITRITE UR QL STRIP: NEGATIVE
NRBC BLD AUTO-RTO: 0 /100 WBC (ref 0–0.2)
PDW BLD-RTO: 17.2 % (ref 11.5–14.5)
PH UR STRIP.AUTO: <=5 [PH] (ref 5–8)
PLATELET # BLD AUTO: 125 10*3/MM3 (ref 130–400)
PLATELET # BLD: 142 K/UL (ref 130–400)
PMV BLD AUTO: 10 FL (ref 9.4–12.3)
PMV BLD AUTO: 9.4 FL (ref 6–12)
POTASSIUM BLD-SCNC: 3.9 MMOL/L (ref 3.5–5.3)
POTASSIUM SERPL-SCNC: 4.6 MMOL/L (ref 3.5–5)
PROT SERPL-MCNC: 5.6 G/DL (ref 6.3–8.7)
PROT UR QL STRIP: NEGATIVE
RBC # BLD AUTO: 3.01 10*6/MM3 (ref 4.2–5.4)
RBC # BLD: 3.19 M/UL (ref 4.2–5.4)
RBC # UR: ABNORMAL /HPF
REF LAB TEST METHOD: ABNORMAL
SEDIMENTATION RATE, ERYTHROCYTE: 4 MM/HR (ref 0–25)
SODIUM BLD-SCNC: 131 MMOL/L (ref 135–145)
SODIUM BLD-SCNC: 131 MMOL/L (ref 136–145)
SP GR UR STRIP: 1.01 (ref 1–1.03)
SQUAMOUS #/AREA URNS HPF: ABNORMAL /HPF
TIBC SERPL-MCNC: 190 MCG/DL (ref 225–420)
TOTAL PROTEIN: 5.7 G/DL (ref 6.6–8.7)
TROPONIN I SERPL-MCNC: 0.02 NG/ML (ref 0–0.03)
TSH SERPL DL<=0.05 MIU/L-ACNC: 5.79 UIU/ML (ref 0.27–4.2)
UROBILINOGEN UR QL STRIP: ABNORMAL
VIT B12 BLD-MCNC: >1000 PG/ML (ref 239–931)
WBC # BLD: 7.8 K/UL (ref 4.8–10.8)
WBC NRBC COR # BLD: 7.55 10*3/MM3 (ref 4.8–10.8)
WBC UR QL AUTO: ABNORMAL /HPF

## 2019-06-27 PROCEDURE — 84484 ASSAY OF TROPONIN QUANT: CPT | Performed by: NURSE PRACTITIONER

## 2019-06-27 PROCEDURE — 96360 HYDRATION IV INFUSION INIT: CPT

## 2019-06-27 PROCEDURE — 83036 HEMOGLOBIN GLYCOSYLATED A1C: CPT | Performed by: NURSE PRACTITIONER

## 2019-06-27 PROCEDURE — 83540 ASSAY OF IRON: CPT | Performed by: NURSE PRACTITIONER

## 2019-06-27 PROCEDURE — 85025 COMPLETE CBC W/AUTO DIFF WBC: CPT | Performed by: NURSE PRACTITIONER

## 2019-06-27 PROCEDURE — G8420 CALC BMI NORM PARAMETERS: HCPCS | Performed by: INTERNAL MEDICINE

## 2019-06-27 PROCEDURE — 99214 OFFICE O/P EST MOD 30 MIN: CPT | Performed by: INTERNAL MEDICINE

## 2019-06-27 PROCEDURE — G8427 DOCREV CUR MEDS BY ELIG CLIN: HCPCS | Performed by: INTERNAL MEDICINE

## 2019-06-27 PROCEDURE — 1123F ACP DISCUSS/DSCN MKR DOCD: CPT | Performed by: INTERNAL MEDICINE

## 2019-06-27 PROCEDURE — 25010000002 ENOXAPARIN PER 10 MG: Performed by: NURSE PRACTITIONER

## 2019-06-27 PROCEDURE — 82728 ASSAY OF FERRITIN: CPT | Performed by: NURSE PRACTITIONER

## 2019-06-27 PROCEDURE — 81001 URINALYSIS AUTO W/SCOPE: CPT | Performed by: FAMILY MEDICINE

## 2019-06-27 PROCEDURE — 83735 ASSAY OF MAGNESIUM: CPT | Performed by: NURSE PRACTITIONER

## 2019-06-27 PROCEDURE — 83550 IRON BINDING TEST: CPT | Performed by: NURSE PRACTITIONER

## 2019-06-27 PROCEDURE — 96361 HYDRATE IV INFUSION ADD-ON: CPT

## 2019-06-27 PROCEDURE — 87086 URINE CULTURE/COLONY COUNT: CPT | Performed by: FAMILY MEDICINE

## 2019-06-27 PROCEDURE — 1036F TOBACCO NON-USER: CPT | Performed by: INTERNAL MEDICINE

## 2019-06-27 PROCEDURE — 4040F PNEUMOC VAC/ADMIN/RCVD: CPT | Performed by: INTERNAL MEDICINE

## 2019-06-27 PROCEDURE — 82607 VITAMIN B-12: CPT | Performed by: NURSE PRACTITIONER

## 2019-06-27 PROCEDURE — 80053 COMPREHEN METABOLIC PANEL: CPT | Performed by: NURSE PRACTITIONER

## 2019-06-27 PROCEDURE — 1090F PRES/ABSN URINE INCON ASSESS: CPT | Performed by: INTERNAL MEDICINE

## 2019-06-27 PROCEDURE — 71250 CT THORAX DX C-: CPT

## 2019-06-27 PROCEDURE — 96372 THER/PROPH/DIAG INJ SC/IM: CPT

## 2019-06-27 RX ORDER — PANTOPRAZOLE SODIUM 40 MG/1
40 TABLET, DELAYED RELEASE ORAL DAILY
Status: DISCONTINUED | OUTPATIENT
Start: 2019-06-28 | End: 2019-06-29 | Stop reason: HOSPADM

## 2019-06-27 RX ORDER — PLANT STANOL ESTER 450 MG
550 TABLET ORAL DAILY
Status: DISCONTINUED | OUTPATIENT
Start: 2019-06-28 | End: 2019-06-29 | Stop reason: HOSPADM

## 2019-06-27 RX ORDER — ACETAMINOPHEN 325 MG/1
650 TABLET ORAL EVERY 4 HOURS PRN
Status: DISCONTINUED | OUTPATIENT
Start: 2019-06-27 | End: 2019-06-29 | Stop reason: HOSPADM

## 2019-06-27 RX ORDER — ALLOPURINOL 300 MG/1
300 TABLET ORAL DAILY
Status: DISCONTINUED | OUTPATIENT
Start: 2019-06-28 | End: 2019-06-29 | Stop reason: HOSPADM

## 2019-06-27 RX ORDER — CYCLOSPORINE 0.5 MG/ML
1 EMULSION OPHTHALMIC 2 TIMES DAILY
Status: DISCONTINUED | OUTPATIENT
Start: 2019-06-27 | End: 2019-06-29 | Stop reason: HOSPADM

## 2019-06-27 RX ORDER — ONDANSETRON 2 MG/ML
4 INJECTION INTRAMUSCULAR; INTRAVENOUS EVERY 6 HOURS PRN
Status: DISCONTINUED | OUTPATIENT
Start: 2019-06-27 | End: 2019-06-29 | Stop reason: HOSPADM

## 2019-06-27 RX ORDER — SODIUM CHLORIDE 9 MG/ML
75 INJECTION, SOLUTION INTRAVENOUS CONTINUOUS
Status: DISCONTINUED | OUTPATIENT
Start: 2019-06-27 | End: 2019-06-29 | Stop reason: HOSPADM

## 2019-06-27 RX ORDER — LATANOPROST 50 UG/ML
1 SOLUTION/ DROPS OPHTHALMIC NIGHTLY
Status: DISCONTINUED | OUTPATIENT
Start: 2019-06-27 | End: 2019-06-29 | Stop reason: HOSPADM

## 2019-06-27 RX ORDER — LISINOPRIL 10 MG/1
10 TABLET ORAL
Status: DISCONTINUED | OUTPATIENT
Start: 2019-06-28 | End: 2019-06-29 | Stop reason: HOSPADM

## 2019-06-27 RX ORDER — SUMATRIPTAN 50 MG/1
50 TABLET, FILM COATED ORAL ONCE
Status: COMPLETED | OUTPATIENT
Start: 2019-06-27 | End: 2019-06-27

## 2019-06-27 RX ORDER — LEVOTHYROXINE SODIUM 0.05 MG/1
50 TABLET ORAL DAILY
Status: DISCONTINUED | OUTPATIENT
Start: 2019-06-28 | End: 2019-06-29 | Stop reason: HOSPADM

## 2019-06-27 RX ORDER — L.ACID,PARA/B.BIFIDUM/S.THERM 8B CELL
1 CAPSULE ORAL DAILY
Status: DISCONTINUED | OUTPATIENT
Start: 2019-06-28 | End: 2019-06-29 | Stop reason: HOSPADM

## 2019-06-27 RX ORDER — ONDANSETRON 4 MG/1
4 TABLET, FILM COATED ORAL EVERY 6 HOURS PRN
Status: DISCONTINUED | OUTPATIENT
Start: 2019-06-27 | End: 2019-06-29 | Stop reason: HOSPADM

## 2019-06-27 RX ORDER — SODIUM CHLORIDE 0.9 % (FLUSH) 0.9 %
3 SYRINGE (ML) INJECTION EVERY 12 HOURS SCHEDULED
Status: DISCONTINUED | OUTPATIENT
Start: 2019-06-27 | End: 2019-06-29 | Stop reason: HOSPADM

## 2019-06-27 RX ORDER — SODIUM CHLORIDE 0.9 % (FLUSH) 0.9 %
3-10 SYRINGE (ML) INJECTION AS NEEDED
Status: DISCONTINUED | OUTPATIENT
Start: 2019-06-27 | End: 2019-06-29 | Stop reason: HOSPADM

## 2019-06-27 RX ORDER — TIMOLOL MALEATE 5 MG/ML
1 SOLUTION/ DROPS OPHTHALMIC 2 TIMES DAILY
Status: DISCONTINUED | OUTPATIENT
Start: 2019-06-27 | End: 2019-06-29 | Stop reason: HOSPADM

## 2019-06-27 RX ORDER — ACETAMINOPHEN 500 MG
500 TABLET ORAL EVERY 6 HOURS PRN
Status: DISCONTINUED | OUTPATIENT
Start: 2019-06-27 | End: 2019-06-27 | Stop reason: SDUPTHER

## 2019-06-27 RX ADMIN — SUMATRIPTAN SUCCINATE 50 MG: 50 TABLET ORAL at 21:01

## 2019-06-27 RX ADMIN — ACETAMINOPHEN 650 MG: 325 TABLET, FILM COATED ORAL at 18:57

## 2019-06-27 RX ADMIN — SODIUM CHLORIDE 125 ML/HR: 9 INJECTION, SOLUTION INTRAVENOUS at 18:33

## 2019-06-27 RX ADMIN — TIMOLOL MALEATE 1 DROP: 5 SOLUTION/ DROPS OPHTHALMIC at 21:02

## 2019-06-27 RX ADMIN — ENOXAPARIN SODIUM 30 MG: 30 INJECTION SUBCUTANEOUS at 21:09

## 2019-06-27 RX ADMIN — LATANOPROST 1 DROP: 50 SOLUTION OPHTHALMIC at 21:01

## 2019-06-27 RX ADMIN — CYCLOSPORINE 1 DROP: 0.5 EMULSION OPHTHALMIC at 21:01

## 2019-06-27 NOTE — PROGRESS NOTES
Chief Complaint   Patient presents with    1 Month Follow-Up     diarrhea -- it had stopped, but started 3 days ago, just in mornings    Fatigue     wants to sleep all the time,  usually active    Edema     face puffy, abd pain. worried about kidneys       HPI: The patient come in for a problem visit. Been working with her for over 6 months with some GI symptoms. Initially she had terrible diarrhea back in the winter she was admitted to the hospital she had acute kidney failure at that time she improved and diarrhea went away. Around [facility] her diarrhea returned. We saw her in the office after a lot of review and discussion we have felt that she probably has a stomach ulcer or she possibly could have an esophageal or lung cancer or other process that we are not seeing with visual imaging. I discussed her CAT scan gallbladder HIDA scan and gallbladder ultrasound with Dr. Arnulfo Dennis last visit. The CAT scan was done at [facility] in March. The abdominal ultrasound was just done the gallbladder HIDA scan is unusual as they could not get the gallbladder to visualize he said the material passed into the small bowel so it wasn't that it was not working but they could not get it to visualize. Patient feels terrible she now describes more of an epigastric discomfort sometimes when she swallowing she wants to gag she is volume depleted not eating much losing weight week more fragile. Family are with her today Colestid has helped her diarrhea. She is having some vague chest pain epigastric but also some left upper chest pain at times.     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     Chronic kidney disease     Eczema     Fibrocystic breast disease (FCBD)     Fibromyalgia     Hypertensive heart disease     Migraine     Mixed hyperlipidemia     Osteoarthritis     Skin cancer     Dr Dagoberto Hinds, rt ant tibial region, 9/07, 1/16 Dr Annalee Tadeo       Past Surgical History:   Procedure tablets at lunch 180 tablet 3    allopurinol (ZYLOPRIM) 300 MG tablet Take 1 tablet by mouth daily 90 tablet 3    pantoprazole (PROTONIX) 40 MG tablet Take 1 tablet by mouth daily 90 tablet 3    ondansetron (ZOFRAN ODT) 4 MG disintegrating tablet Take 1 tablet by mouth every 8 hours as needed for Nausea or Vomiting 30 tablet 0    niacin 500 MG extended release capsule Take 1 capsule by mouth 2 times daily 180 capsule 3    cloNIDine (CATAPRES) 0.1 MG tablet TAKE 1 TABLET DAILY AS NEEDED FOR BLOOD PRESSURE OVER 170/90 90 tablet 3    rizatriptan (MAXALT) 10 MG tablet Take 1 tablet by mouth once as needed for Migraine May repeat in 2 hours if needed 90 tablet 3    cycloSPORINE (RESTASIS) 0.05 % ophthalmic emulsion 1 drop 2 times daily      Multiple Vitamins-Minerals (THERAPEUTIC MULTIVITAMIN-MINERALS) tablet Take 1 tablet by mouth daily      Acetaminophen (TYLENOL ARTHRITIS PAIN PO) Take by mouth      timolol (TIMOPTIC) 0.5 % ophthalmic solution 1 drop 2 times daily      aspirin 325 MG tablet Take 325 mg by mouth daily      bimatoprost (LUMIGAN) 0.01 % SOLN ophthalmic drops Place 1 drop into both eyes nightly      colchicine (COLCRYS) 0.6 MG tablet Take 0.6 mg by mouth 2 times daily as needed for Pain      Glucosamine-Chondroit-Vit C-Mn (GLUCOSAMINE CHONDR 1500 COMPLX) CAPS Take by mouth      Potassium Gluconate 550 MG TABS Take by mouth       No current facility-administered medications for this visit. Review of Systems   Constitutional: Positive for fatigue. Negative for chills and fever. HENT: Negative for congestion and sinus pressure. Eyes: Negative for discharge and redness. Respiratory: Positive for shortness of breath. Negative for cough. Cardiovascular: Positive for chest pain. Negative for palpitations and leg swelling. Gastrointestinal: Positive for abdominal distention, abdominal pain and diarrhea. Genitourinary: Negative for dysuria.    Musculoskeletal: Positive for arthralgias and gait problem. Negative for back pain. Skin: Negative for rash and wound. Neurological: Positive for weakness and light-headedness. Negative for dizziness. Psychiatric/Behavioral: Negative for dysphoric mood and sleep disturbance. The patient is not nervous/anxious. BP (!) 90/48 (Site: Right Upper Arm)   Pulse 75   Ht 5' (1.524 m)   Wt 107 lb (48.5 kg)   SpO2 96%   BMI 20.90 kg/m²   BP Readings from Last 7 Encounters:   06/27/19 (!) 90/48   06/05/19 98/64   05/20/19 100/70   05/14/19 (!) 146/76   03/14/19 96/70   03/01/19 (!) 60/40   02/28/19 108/68     Wt Readings from Last 7 Encounters:   06/27/19 107 lb (48.5 kg)   06/05/19 108 lb (49 kg)   05/20/19 109 lb (49.4 kg)   05/14/19 109 lb (49.4 kg)   03/14/19 119 lb (54 kg)   03/01/19 116 lb (52.6 kg)   11/08/18 121 lb (54.9 kg)     BMI Readings from Last 7 Encounters:   06/27/19 20.90 kg/m²   06/05/19 21.09 kg/m²   05/20/19 21.29 kg/m²   05/14/19 21.29 kg/m²   03/14/19 23.24 kg/m²   03/01/19 22.65 kg/m²   11/08/18 23.63 kg/m²     Resp Readings from Last 7 Encounters:   06/05/19 20       Physical Exam patient appears much more fragile and weak than her baseline she has alopecia sclera anicteric heart S1-S2 lungs clear abdomen without palpable mass she's got some mild epigastric tenderness extremities without edema.     Results for orders placed or performed in visit on 05/22/19   Clostridium Difficile Toxin/Antigen   Result Value Ref Range    C difficile Toxin, EIA       Result: Negative for Toxigenic C. difficile by EIA  Normal Range: Negative     CBC   Result Value Ref Range    WBC 6.0 4.8 - 10.8 K/uL    RBC 3.56 (L) 4.20 - 5.40 M/uL    Hemoglobin 11.5 (L) 12.0 - 16.0 g/dL    Hematocrit 35.5 (L) 37.0 - 47.0 %    MCV 99.7 (H) 81.0 - 99.0 fL    MCH 32.3 (H) 27.0 - 31.0 pg    MCHC 32.4 (L) 33.0 - 37.0 g/dL    RDW 14.1 11.5 - 14.5 %    Platelets 035 678 - 339 K/uL    MPV 9.4 9.4 - 12.3 fL   Comprehensive Metabolic Panel   Result Value Ref Range    Sodium 137 136 - 145 mmol/L    Potassium 5.2 (H) 3.5 - 5.0 mmol/L    Chloride 102 98 - 111 mmol/L    CO2 21 (L) 22 - 29 mmol/L    Anion Gap 14 7 - 19 mmol/L    Glucose 121 (H) 74 - 109 mg/dL    BUN 38 (H) 8 - 23 mg/dL    CREATININE 1.9 (H) 0.5 - 0.9 mg/dL    GFR Non-African American 25 (A) >60    Calcium 9.4 8.2 - 9.6 mg/dL    Total Protein 6.1 (L) 6.6 - 8.7 g/dL    Alb 3.4 (L) 3.5 - 5.2 g/dL    Total Bilirubin 0.3 0.2 - 1.2 mg/dL    Alkaline Phosphatase 118 (H) 35 - 104 U/L    ALT 37 (H) 5 - 33 U/L    AST 66 (H) 5 - 32 U/L   TSH without Reflex   Result Value Ref Range    TSH 3.780 0.270 - 4.200 uIU/mL   Sedimentation Rate   Result Value Ref Range    Sed Rate 13 0 - 25 mm/Hr   C-Reactive Protein   Result Value Ref Range    CRP 0.06 0.00 - 0.50 mg/dL       ASSESSMENT/ PLAN:  1. Epigastric pain  Patient was taking a lot of Excedrin we had her stop this but she still taking a full strength aspirin she has to stop the aspirin. I'm very worried she either has esophageal or stomach cancer or a peptic ulcer was some estradiol and obstruction patient needs to take the medication and stop all NSAIDs all aspirin I called the hospitalist she needs an endoscopy she needs it while in the hospital we called the hospitalist for admission she's also having some vague chest pain needs to be sure this is not cardiac. She needs an echocardiogram.    2. Diarrhea due to malabsorption  Diarrhea is better but and ulcer or upper GI process that we cannot visualize so far could be causing this she has elevated liver enzymes elevated alkaline phosphatase anemia she needs admission to the hospital- CBC; Future  - Comprehensive Metabolic Panel; Future  - TSH without Reflex; Future  - Sedimentation Rate; Future  - C-Reactive Protein; Future    3. Facial swelling  Had a chest CT today I'm worried that SVC syndrome chest CT really was nonrevealing.  - CT CHEST WO CONTRAST; Future  - ECHO Complete 2D W Doppler W Color; Future    4.  Chest pain, unspecified type    - CT CHEST WO CONTRAST; Future  - ECHO Complete 2D W Doppler W Color; Future    5. Acute renal failure, unspecified acute renal failure type [facility])  She needs volume repletion and evaluation in the hospital due to her advanced age and other medical problems    6. Anemia due to chronic kidney disease, unspecified CKD stage  I'm worried she is developed an ulcer. Patient needs evaluation for ongoing vague abdominal symptoms of queasiness nausea and gagging when she eats.  She's also had terrible diarrhea which is finally better she needs an endoscopy upper endoscopy and thorough evaluation she will be admitted to the hospitalist I spoke with hospitalist while she was here

## 2019-06-28 ENCOUNTER — APPOINTMENT (OUTPATIENT)
Dept: CARDIOLOGY | Facility: HOSPITAL | Age: 84
End: 2019-06-28

## 2019-06-28 ENCOUNTER — TELEPHONE (OUTPATIENT)
Dept: INTERNAL MEDICINE | Age: 84
End: 2019-06-28

## 2019-06-28 DIAGNOSIS — D50.0 IRON DEFICIENCY ANEMIA DUE TO CHRONIC BLOOD LOSS: Primary | ICD-10-CM

## 2019-06-28 LAB
ANION GAP SERPL CALCULATED.3IONS-SCNC: 8 MMOL/L (ref 4–13)
BH CV ECHO MEAS - AO MAX PG (FULL): 3.2 MMHG
BH CV ECHO MEAS - AO MAX PG: 8.4 MMHG
BH CV ECHO MEAS - AO MEAN PG (FULL): 2 MMHG
BH CV ECHO MEAS - AO MEAN PG: 4 MMHG
BH CV ECHO MEAS - AO ROOT AREA (BSA CORRECTED): 2.4
BH CV ECHO MEAS - AO ROOT AREA: 9.1 CM^2
BH CV ECHO MEAS - AO ROOT DIAM: 3.4 CM
BH CV ECHO MEAS - AO V2 MAX: 145 CM/SEC
BH CV ECHO MEAS - AO V2 MEAN: 95.6 CM/SEC
BH CV ECHO MEAS - AO V2 VTI: 28.2 CM
BH CV ECHO MEAS - AVA(I,A): 2.6 CM^2
BH CV ECHO MEAS - AVA(I,D): 2.6 CM^2
BH CV ECHO MEAS - AVA(V,A): 2.5 CM^2
BH CV ECHO MEAS - AVA(V,D): 2.5 CM^2
BH CV ECHO MEAS - BSA(HAYCOCK): 1.4 M^2
BH CV ECHO MEAS - BSA: 1.4 M^2
BH CV ECHO MEAS - BZI_BMI: 20.1 KILOGRAMS/M^2
BH CV ECHO MEAS - BZI_METRIC_HEIGHT: 152.4 CM
BH CV ECHO MEAS - BZI_METRIC_WEIGHT: 46.7 KG
BH CV ECHO MEAS - EDV(CUBED): 64.5 ML
BH CV ECHO MEAS - EDV(MOD-SP4): 32.4 ML
BH CV ECHO MEAS - EDV(TEICH): 70.4 ML
BH CV ECHO MEAS - EF(CUBED): 74.9 %
BH CV ECHO MEAS - EF(MOD-SP4): 67 %
BH CV ECHO MEAS - EF(TEICH): 67.3 %
BH CV ECHO MEAS - ESV(CUBED): 16.2 ML
BH CV ECHO MEAS - ESV(MOD-SP4): 10.7 ML
BH CV ECHO MEAS - ESV(TEICH): 23 ML
BH CV ECHO MEAS - FS: 36.9 %
BH CV ECHO MEAS - IVS/LVPW: 0.97
BH CV ECHO MEAS - IVSD: 1 CM
BH CV ECHO MEAS - LA DIMENSION: 3 CM
BH CV ECHO MEAS - LA/AO: 0.88
BH CV ECHO MEAS - LAT PEAK E' VEL: 4.1 CM/SEC
BH CV ECHO MEAS - LV DIASTOLIC VOL/BSA (35-75): 23 ML/M^2
BH CV ECHO MEAS - LV MASS(C)D: 135.8 GRAMS
BH CV ECHO MEAS - LV MASS(C)DI: 96.5 GRAMS/M^2
BH CV ECHO MEAS - LV MAX PG: 5.2 MMHG
BH CV ECHO MEAS - LV MEAN PG: 2 MMHG
BH CV ECHO MEAS - LV SYSTOLIC VOL/BSA (12-30): 7.6 ML/M^2
BH CV ECHO MEAS - LV V1 MAX: 114 CM/SEC
BH CV ECHO MEAS - LV V1 MEAN: 73.1 CM/SEC
BH CV ECHO MEAS - LV V1 VTI: 23.3 CM
BH CV ECHO MEAS - LVIDD: 4 CM
BH CV ECHO MEAS - LVIDS: 2.5 CM
BH CV ECHO MEAS - LVLD AP4: 6.2 CM
BH CV ECHO MEAS - LVLS AP4: 5.1 CM
BH CV ECHO MEAS - LVOT AREA (M): 3.1 CM^2
BH CV ECHO MEAS - LVOT AREA: 3.1 CM^2
BH CV ECHO MEAS - LVOT DIAM: 2 CM
BH CV ECHO MEAS - LVPWD: 1.1 CM
BH CV ECHO MEAS - MED PEAK E' VEL: 7.9 CM/SEC
BH CV ECHO MEAS - MV A MAX VEL: 125 CM/SEC
BH CV ECHO MEAS - MV DEC SLOPE: 326 CM/SEC^2
BH CV ECHO MEAS - MV DEC TIME: 0.32 SEC
BH CV ECHO MEAS - MV E MAX VEL: 86.8 CM/SEC
BH CV ECHO MEAS - MV E/A: 0.69
BH CV ECHO MEAS - MV P1/2T MAX VEL: 111 CM/SEC
BH CV ECHO MEAS - MV P1/2T: 99.7 MSEC
BH CV ECHO MEAS - MVA P1/2T LCG: 2 CM^2
BH CV ECHO MEAS - MVA(P1/2T): 2.2 CM^2
BH CV ECHO MEAS - RAP SYSTOLE: 5 MMHG
BH CV ECHO MEAS - RVSP: 33.9 MMHG
BH CV ECHO MEAS - SI(AO): 181.9 ML/M^2
BH CV ECHO MEAS - SI(CUBED): 34.3 ML/M^2
BH CV ECHO MEAS - SI(LVOT): 52 ML/M^2
BH CV ECHO MEAS - SI(MOD-SP4): 15.4 ML/M^2
BH CV ECHO MEAS - SI(TEICH): 33.7 ML/M^2
BH CV ECHO MEAS - SV(AO): 256 ML
BH CV ECHO MEAS - SV(CUBED): 48.3 ML
BH CV ECHO MEAS - SV(LVOT): 73.2 ML
BH CV ECHO MEAS - SV(MOD-SP4): 21.7 ML
BH CV ECHO MEAS - SV(TEICH): 47.4 ML
BH CV ECHO MEAS - TR MAX VEL: 269 CM/SEC
BH CV ECHO MEASUREMENTS AVERAGE E/E' RATIO: 14.47
BUN BLD-MCNC: 43 MG/DL (ref 5–21)
BUN/CREAT SERPL: 22.6 (ref 7–25)
CALCIUM SPEC-SCNC: 8.3 MG/DL (ref 8.4–10.4)
CHLORIDE SERPL-SCNC: 106 MMOL/L (ref 98–110)
CO2 SERPL-SCNC: 20 MMOL/L (ref 24–31)
CREAT BLD-MCNC: 1.9 MG/DL (ref 0.5–1.4)
DEPRECATED RDW RBC AUTO: 53.2 FL (ref 40–54)
ERYTHROCYTE [DISTWIDTH] IN BLOOD BY AUTOMATED COUNT: 16.3 % (ref 12–15)
GFR SERPL CREATININE-BSD FRML MDRD: 25 ML/MIN/1.73
GLUCOSE BLD-MCNC: 94 MG/DL (ref 70–100)
HBA1C MFR BLD: 6.9 %
HCT VFR BLD AUTO: 27.4 % (ref 37–47)
HGB BLD-MCNC: 9.8 G/DL (ref 12–16)
LEFT ATRIUM VOLUME INDEX: 19.1 ML/M2
MAXIMAL PREDICTED HEART RATE: 128 BPM
MCH RBC QN AUTO: 32.3 PG (ref 28–32)
MCHC RBC AUTO-ENTMCNC: 35.8 G/DL (ref 33–36)
MCV RBC AUTO: 90.4 FL (ref 82–98)
PLATELET # BLD AUTO: 137 10*3/MM3 (ref 130–400)
PMV BLD AUTO: 10.2 FL (ref 6–12)
POTASSIUM BLD-SCNC: 4.2 MMOL/L (ref 3.5–5.3)
PREALB SERPL-MCNC: 21 MG/DL (ref 18–36)
RBC # BLD AUTO: 3.03 10*6/MM3 (ref 4.2–5.4)
SODIUM BLD-SCNC: 134 MMOL/L (ref 135–145)
STRESS TARGET HR: 109 BPM
TROPONIN I SERPL-MCNC: 0.03 NG/ML (ref 0–0.03)
TROPONIN I SERPL-MCNC: 0.04 NG/ML (ref 0–0.03)
WBC NRBC COR # BLD: 7.09 10*3/MM3 (ref 4.8–10.8)

## 2019-06-28 PROCEDURE — 94799 UNLISTED PULMONARY SVC/PX: CPT

## 2019-06-28 PROCEDURE — 84134 ASSAY OF PREALBUMIN: CPT | Performed by: NURSE PRACTITIONER

## 2019-06-28 PROCEDURE — G0378 HOSPITAL OBSERVATION PER HR: HCPCS

## 2019-06-28 PROCEDURE — 96372 THER/PROPH/DIAG INJ SC/IM: CPT

## 2019-06-28 PROCEDURE — 92610 EVALUATE SWALLOWING FUNCTION: CPT

## 2019-06-28 PROCEDURE — 97165 OT EVAL LOW COMPLEX 30 MIN: CPT

## 2019-06-28 PROCEDURE — 80048 BASIC METABOLIC PNL TOTAL CA: CPT | Performed by: NURSE PRACTITIONER

## 2019-06-28 PROCEDURE — 93306 TTE W/DOPPLER COMPLETE: CPT

## 2019-06-28 PROCEDURE — 85027 COMPLETE CBC AUTOMATED: CPT | Performed by: NURSE PRACTITIONER

## 2019-06-28 PROCEDURE — 25010000002 ENOXAPARIN PER 10 MG: Performed by: NURSE PRACTITIONER

## 2019-06-28 PROCEDURE — 96361 HYDRATE IV INFUSION ADD-ON: CPT

## 2019-06-28 PROCEDURE — 97161 PT EVAL LOW COMPLEX 20 MIN: CPT | Performed by: PHYSICAL THERAPIST

## 2019-06-28 PROCEDURE — 94760 N-INVAS EAR/PLS OXIMETRY 1: CPT

## 2019-06-28 PROCEDURE — 93306 TTE W/DOPPLER COMPLETE: CPT | Performed by: INTERNAL MEDICINE

## 2019-06-28 PROCEDURE — 84484 ASSAY OF TROPONIN QUANT: CPT | Performed by: NURSE PRACTITIONER

## 2019-06-28 RX ADMIN — LATANOPROST 1 DROP: 50 SOLUTION OPHTHALMIC at 20:37

## 2019-06-28 RX ADMIN — TIMOLOL MALEATE 1 DROP: 5 SOLUTION/ DROPS OPHTHALMIC at 10:20

## 2019-06-28 RX ADMIN — LEVOTHYROXINE SODIUM 50 MCG: 50 TABLET ORAL at 10:20

## 2019-06-28 RX ADMIN — ALLOPURINOL 300 MG: 300 TABLET ORAL at 10:20

## 2019-06-28 RX ADMIN — SODIUM CHLORIDE, PRESERVATIVE FREE 3 ML: 5 INJECTION INTRAVENOUS at 20:38

## 2019-06-28 RX ADMIN — LISINOPRIL 10 MG: 10 TABLET ORAL at 10:20

## 2019-06-28 RX ADMIN — ENOXAPARIN SODIUM 30 MG: 30 INJECTION SUBCUTANEOUS at 20:37

## 2019-06-28 RX ADMIN — CYCLOSPORINE 1 DROP: 0.5 EMULSION OPHTHALMIC at 10:20

## 2019-06-28 RX ADMIN — CYCLOSPORINE 1 DROP: 0.5 EMULSION OPHTHALMIC at 20:37

## 2019-06-28 RX ADMIN — PANTOPRAZOLE SODIUM 40 MG: 40 TABLET, DELAYED RELEASE ORAL at 10:20

## 2019-06-28 RX ADMIN — Medication 1 CAPSULE: at 10:20

## 2019-06-28 RX ADMIN — TIMOLOL MALEATE 1 DROP: 5 SOLUTION/ DROPS OPHTHALMIC at 20:37

## 2019-06-29 VITALS
WEIGHT: 106.5 LBS | HEART RATE: 79 BPM | OXYGEN SATURATION: 98 % | DIASTOLIC BLOOD PRESSURE: 69 MMHG | SYSTOLIC BLOOD PRESSURE: 131 MMHG | HEIGHT: 60 IN | RESPIRATION RATE: 16 BRPM | BODY MASS INDEX: 20.91 KG/M2 | TEMPERATURE: 97.8 F

## 2019-06-29 LAB
ALBUMIN SERPL-MCNC: 2.6 G/DL (ref 3.5–5)
ALBUMIN/GLOB SERPL: 1 G/DL (ref 1.1–2.5)
ALP SERPL-CCNC: 155 U/L (ref 24–120)
ALT SERPL W P-5'-P-CCNC: 41 U/L (ref 0–54)
ANION GAP SERPL CALCULATED.3IONS-SCNC: 8 MMOL/L (ref 4–13)
ARTICHOKE IGE QN: 41 MG/DL (ref 0–99)
AST SERPL-CCNC: 51 U/L (ref 7–45)
BACTERIA SPEC AEROBE CULT: ABNORMAL
BACTERIA SPEC AEROBE CULT: ABNORMAL
BASOPHILS # BLD AUTO: 0.03 10*3/MM3 (ref 0–0.2)
BASOPHILS NFR BLD AUTO: 0.5 % (ref 0–2)
BILIRUB SERPL-MCNC: 0.6 MG/DL (ref 0.1–1)
BUN BLD-MCNC: 31 MG/DL (ref 5–21)
BUN/CREAT SERPL: 21.2 (ref 7–25)
CALCIUM SPEC-SCNC: 7.8 MG/DL (ref 8.4–10.4)
CHLORIDE SERPL-SCNC: 109 MMOL/L (ref 98–110)
CHOLEST SERPL-MCNC: 54 MG/DL (ref 130–200)
CO2 SERPL-SCNC: 20 MMOL/L (ref 24–31)
CREAT BLD-MCNC: 1.46 MG/DL (ref 0.5–1.4)
DEPRECATED RDW RBC AUTO: 54.9 FL (ref 40–54)
EOSINOPHIL # BLD AUTO: 0.18 10*3/MM3 (ref 0–0.7)
EOSINOPHIL NFR BLD AUTO: 3.2 % (ref 0–4)
ERYTHROCYTE [DISTWIDTH] IN BLOOD BY AUTOMATED COUNT: 16.5 % (ref 12–15)
GFR SERPL CREATININE-BSD FRML MDRD: 34 ML/MIN/1.73
GLOBULIN UR ELPH-MCNC: 2.5 GM/DL
GLUCOSE BLD-MCNC: 86 MG/DL (ref 70–100)
HCT VFR BLD AUTO: 27.2 % (ref 37–47)
HDLC SERPL-MCNC: 18 MG/DL
HGB BLD-MCNC: 9.5 G/DL (ref 12–16)
IMM GRANULOCYTES # BLD AUTO: 0.05 10*3/MM3 (ref 0–0.05)
IMM GRANULOCYTES NFR BLD AUTO: 0.9 % (ref 0–5)
LDLC/HDLC SERPL: 1.1 {RATIO}
LYMPHOCYTES # BLD AUTO: 1.98 10*3/MM3 (ref 0.72–4.86)
LYMPHOCYTES NFR BLD AUTO: 35.6 % (ref 15–45)
MCH RBC QN AUTO: 32.6 PG (ref 28–32)
MCHC RBC AUTO-ENTMCNC: 34.9 G/DL (ref 33–36)
MCV RBC AUTO: 93.5 FL (ref 82–98)
MONOCYTES # BLD AUTO: 0.72 10*3/MM3 (ref 0.19–1.3)
MONOCYTES NFR BLD AUTO: 12.9 % (ref 4–12)
NEUTROPHILS # BLD AUTO: 2.6 10*3/MM3 (ref 1.87–8.4)
NEUTROPHILS NFR BLD AUTO: 46.9 % (ref 39–78)
NRBC BLD AUTO-RTO: 0 /100 WBC (ref 0–0.2)
PLATELET # BLD AUTO: 131 10*3/MM3 (ref 130–400)
PMV BLD AUTO: 10.5 FL (ref 6–12)
POTASSIUM BLD-SCNC: 3.7 MMOL/L (ref 3.5–5.3)
PROT SERPL-MCNC: 5.1 G/DL (ref 6.3–8.7)
RBC # BLD AUTO: 2.91 10*6/MM3 (ref 4.2–5.4)
SODIUM BLD-SCNC: 137 MMOL/L (ref 135–145)
T4 FREE SERPL-MCNC: 0.93 NG/DL (ref 0.78–2.19)
TRIGL SERPL-MCNC: 81 MG/DL (ref 0–149)
WBC NRBC COR # BLD: 5.56 10*3/MM3 (ref 4.8–10.8)

## 2019-06-29 PROCEDURE — 85025 COMPLETE CBC W/AUTO DIFF WBC: CPT | Performed by: FAMILY MEDICINE

## 2019-06-29 PROCEDURE — 84439 ASSAY OF FREE THYROXINE: CPT | Performed by: FAMILY MEDICINE

## 2019-06-29 PROCEDURE — 80053 COMPREHEN METABOLIC PANEL: CPT | Performed by: FAMILY MEDICINE

## 2019-06-29 PROCEDURE — G0378 HOSPITAL OBSERVATION PER HR: HCPCS

## 2019-06-29 PROCEDURE — 80061 LIPID PANEL: CPT | Performed by: FAMILY MEDICINE

## 2019-06-29 PROCEDURE — 97110 THERAPEUTIC EXERCISES: CPT

## 2019-06-29 PROCEDURE — 97116 GAIT TRAINING THERAPY: CPT

## 2019-06-29 RX ORDER — BRIMONIDINE TARTRATE AND TIMOLOL MALEATE 2; 5 MG/ML; MG/ML
1 SOLUTION OPHTHALMIC EVERY 12 HOURS
COMMUNITY

## 2019-06-29 RX ORDER — MONTELUKAST SODIUM 4 MG/1
2 TABLET, CHEWABLE ORAL
COMMUNITY
End: 2019-06-29 | Stop reason: HOSPADM

## 2019-06-29 RX ORDER — TOPIRAMATE 25 MG/1
25 TABLET ORAL 2 TIMES DAILY
COMMUNITY
End: 2019-06-29 | Stop reason: HOSPADM

## 2019-06-29 RX ORDER — L.ACID,PARA/B.BIFIDUM/S.THERM 8B CELL
1 CAPSULE ORAL DAILY
Qty: 30 CAPSULE | Refills: 2 | Status: SHIPPED | OUTPATIENT
Start: 2019-06-30

## 2019-06-29 RX ORDER — LISINOPRIL 10 MG/1
10 TABLET ORAL
Qty: 30 TABLET | Refills: 2 | Status: SHIPPED | OUTPATIENT
Start: 2019-06-30

## 2019-06-29 RX ADMIN — Medication 550 MG: at 08:41

## 2019-06-29 RX ADMIN — LEVOTHYROXINE SODIUM 50 MCG: 50 TABLET ORAL at 08:41

## 2019-06-29 RX ADMIN — LISINOPRIL 10 MG: 10 TABLET ORAL at 08:40

## 2019-06-29 RX ADMIN — PANTOPRAZOLE SODIUM 40 MG: 40 TABLET, DELAYED RELEASE ORAL at 08:40

## 2019-06-29 RX ADMIN — SODIUM CHLORIDE, PRESERVATIVE FREE 3 ML: 5 INJECTION INTRAVENOUS at 08:42

## 2019-06-29 RX ADMIN — Medication 1 CAPSULE: at 08:41

## 2019-06-29 RX ADMIN — ALLOPURINOL 300 MG: 300 TABLET ORAL at 08:40

## 2019-06-29 RX ADMIN — CYCLOSPORINE 1 DROP: 0.5 EMULSION OPHTHALMIC at 08:41

## 2019-06-29 RX ADMIN — TIMOLOL MALEATE 1 DROP: 5 SOLUTION/ DROPS OPHTHALMIC at 08:41

## 2019-06-30 ASSESSMENT — ENCOUNTER SYMPTOMS
BACK PAIN: 0
ABDOMINAL PAIN: 1
COUGH: 0
SINUS PRESSURE: 0
DIARRHEA: 1
ABDOMINAL DISTENTION: 1
EYE REDNESS: 0
SHORTNESS OF BREATH: 1
EYE DISCHARGE: 0

## 2019-06-30 NOTE — TELEPHONE ENCOUNTER
Call grandriaz and let her know I [facility] I am REALLY sorry they did not due endoscopy when I admitted her I had expressed to them we felt she needed this and full evaluation of her symptoms   -- she needs to stay off aspirin and all nsaids --she is a little more anemic and her exam and findings are worrisome for sometihing in stomach/ esophagus-such as an ulcer or may have a caner or other process we have not diagnosed  -- stay on the protonix but actually have her take it twice a day until I can get her in with GI and then decrease to once a day when GI says it is ok--- we will refer to GI and see if they can proceed with an endoscopy or they may want to get a less invase test such as an upper GI first -- I put stat referal to Dr Maday Patrick (or one of his partners if he cant see soon)  and can you call and get her in with GI this week --please call GI or otherwise it may never get scheduled and they have had a long time of trying to deal with this - [facility] really should have addressed this while she was there

## 2019-07-01 ENCOUNTER — TELEPHONE (OUTPATIENT)
Dept: INTERNAL MEDICINE | Age: 84
End: 2019-07-01

## 2019-07-02 ENCOUNTER — TELEPHONE (OUTPATIENT)
Dept: INTERNAL MEDICINE | Age: 84
End: 2019-07-02

## 2019-07-02 ENCOUNTER — TRANSCRIBE ORDERS (OUTPATIENT)
Dept: ADMINISTRATIVE | Facility: HOSPITAL | Age: 84
End: 2019-07-02

## 2019-07-02 DIAGNOSIS — R13.10 DYSPHAGIA, UNSPECIFIED TYPE: Primary | ICD-10-CM

## 2019-07-02 NOTE — TELEPHONE ENCOUNTER
Per Dr. Radha Bonilla, set her up for an upper GI at [facility].  I already made her an appt with GI. Let family know she wants to go ahead and get upper gi.

## 2019-07-05 ENCOUNTER — HOSPITAL ENCOUNTER (OUTPATIENT)
Dept: GENERAL RADIOLOGY | Facility: HOSPITAL | Age: 84
End: 2019-07-05

## 2019-07-08 ENCOUNTER — TELEPHONE (OUTPATIENT)
Dept: INTERNAL MEDICINE | Age: 84
End: 2019-07-08

## 2019-07-08 DIAGNOSIS — N18.9 ANEMIA DUE TO CHRONIC KIDNEY DISEASE, UNSPECIFIED CKD STAGE: Primary | ICD-10-CM

## 2019-07-08 DIAGNOSIS — D63.1 ANEMIA DUE TO CHRONIC KIDNEY DISEASE, UNSPECIFIED CKD STAGE: Primary | ICD-10-CM

## 2019-07-08 RX ORDER — LISINOPRIL 10 MG/1
10 TABLET ORAL DAILY
Qty: 90 TABLET | Refills: 3
Start: 2019-07-08 | End: 2019-07-19

## 2019-07-08 RX ORDER — L.ACID,PARA/B.BIFIDUM/S.THERM 8B CELL
1 CAPSULE ORAL DAILY
Qty: 90 CAPSULE | Refills: 3
Start: 2019-07-08 | End: 2019-07-19 | Stop reason: SDUPTHER

## 2019-07-08 NOTE — TELEPHONE ENCOUNTER
I spoke with Lorena Gabriel on Tuesday and told her that we were ordering an upper GI on her to have done before she sees the GI doctor and that after she sees GI they would order the endoscopy. She took her to [facility] and told them she was to have an endoscopy so they ended up canceling the upper GI. She has a follow up with you on 07/15/19 and is seeing GI on 07/17/19. Any further orders?

## 2019-07-08 NOTE — TELEPHONE ENCOUNTER
I called her home number and I spoke with her daughter from out of town who is there. We reveiwed all meds. Please cancel her accupril with [facility] (call and cancel). Hospital put her on lisinopril instead and some recent medicine confusion. Please cancel 7/15 apt with me and change apt with me to be on Friday 7/19 in mid day (during block spots) and call daughter Jez Belt with that appointment [phone]  She feels better and I put in order for some labs she is going to get tomorrow they know to come for those labs.

## 2019-07-09 DIAGNOSIS — N18.9 ANEMIA DUE TO CHRONIC KIDNEY DISEASE, UNSPECIFIED CKD STAGE: Primary | ICD-10-CM

## 2019-07-09 DIAGNOSIS — D63.1 ANEMIA DUE TO CHRONIC KIDNEY DISEASE, UNSPECIFIED CKD STAGE: Primary | ICD-10-CM

## 2019-07-09 DIAGNOSIS — D63.1 ANEMIA DUE TO CHRONIC KIDNEY DISEASE, UNSPECIFIED CKD STAGE: ICD-10-CM

## 2019-07-09 DIAGNOSIS — N18.9 ANEMIA DUE TO CHRONIC KIDNEY DISEASE, UNSPECIFIED CKD STAGE: ICD-10-CM

## 2019-07-09 LAB
ALBUMIN SERPL-MCNC: 3.8 G/DL (ref 3.5–5.2)
ALP BLD-CCNC: 159 U/L (ref 35–104)
ALT SERPL-CCNC: 28 U/L (ref 5–33)
ANION GAP SERPL CALCULATED.3IONS-SCNC: 12 MMOL/L (ref 7–19)
AST SERPL-CCNC: 35 U/L (ref 5–32)
BILIRUB SERPL-MCNC: 0.3 MG/DL (ref 0.2–1.2)
BUN BLDV-MCNC: 15 MG/DL (ref 8–23)
CALCIUM SERPL-MCNC: 8.7 MG/DL (ref 8.2–9.6)
CHLORIDE BLD-SCNC: 103 MMOL/L (ref 98–111)
CO2: 29 MMOL/L (ref 22–29)
CREAT SERPL-MCNC: 1 MG/DL (ref 0.5–0.9)
FERRITIN: 376.7 NG/ML (ref 13–150)
GFR NON-AFRICAN AMERICAN: 52
GLUCOSE BLD-MCNC: 88 MG/DL (ref 74–109)
HCT VFR BLD CALC: 28.7 % (ref 37–47)
HEMOGLOBIN: 9 G/DL (ref 12–16)
IRON: 72 UG/DL (ref 37–145)
MCH RBC QN AUTO: 32.1 PG (ref 27–31)
MCHC RBC AUTO-ENTMCNC: 31.4 G/DL (ref 33–37)
MCV RBC AUTO: 102.5 FL (ref 81–99)
PDW BLD-RTO: 17.2 % (ref 11.5–14.5)
PLATELET # BLD: 339 K/UL (ref 130–400)
PMV BLD AUTO: 9.3 FL (ref 9.4–12.3)
POTASSIUM SERPL-SCNC: 3.9 MMOL/L (ref 3.5–5)
RBC # BLD: 2.8 M/UL (ref 4.2–5.4)
SODIUM BLD-SCNC: 144 MMOL/L (ref 136–145)
TOTAL PROTEIN: 6.6 G/DL (ref 6.6–8.7)
WBC # BLD: 6.5 K/UL (ref 4.8–10.8)

## 2019-07-10 DIAGNOSIS — D63.1 ANEMIA DUE TO CHRONIC KIDNEY DISEASE, UNSPECIFIED CKD STAGE: ICD-10-CM

## 2019-07-10 DIAGNOSIS — N18.9 ANEMIA DUE TO CHRONIC KIDNEY DISEASE, UNSPECIFIED CKD STAGE: ICD-10-CM

## 2019-07-10 LAB
FOLATE: 16.5 NG/ML (ref 4.8–37.3)
VITAMIN B-12: 1278 PG/ML (ref 211–946)

## 2019-07-19 ENCOUNTER — OFFICE VISIT (OUTPATIENT)
Dept: INTERNAL MEDICINE | Age: 84
End: 2019-07-19
Payer: MEDICARE

## 2019-07-19 VITALS
OXYGEN SATURATION: 92 % | BODY MASS INDEX: 21.55 KG/M2 | HEART RATE: 85 BPM | SYSTOLIC BLOOD PRESSURE: 170 MMHG | WEIGHT: 109.8 LBS | DIASTOLIC BLOOD PRESSURE: 80 MMHG | HEIGHT: 60 IN

## 2019-07-19 DIAGNOSIS — K90.9 DIARRHEA DUE TO MALABSORPTION: Primary | ICD-10-CM

## 2019-07-19 DIAGNOSIS — D63.1 ANEMIA DUE TO CHRONIC KIDNEY DISEASE, UNSPECIFIED CKD STAGE: ICD-10-CM

## 2019-07-19 DIAGNOSIS — M10.9 ARTHRITIS DUE TO GOUT: ICD-10-CM

## 2019-07-19 DIAGNOSIS — N18.9 CHRONIC KIDNEY DISEASE, UNSPECIFIED CKD STAGE: ICD-10-CM

## 2019-07-19 DIAGNOSIS — N18.9 ANEMIA DUE TO CHRONIC KIDNEY DISEASE, UNSPECIFIED CKD STAGE: ICD-10-CM

## 2019-07-19 DIAGNOSIS — N18.9 ANEMIA IN CHRONIC KIDNEY DISEASE, UNSPECIFIED CKD STAGE: ICD-10-CM

## 2019-07-19 DIAGNOSIS — D63.1 ANEMIA IN CHRONIC KIDNEY DISEASE, UNSPECIFIED CKD STAGE: ICD-10-CM

## 2019-07-19 DIAGNOSIS — R19.7 DIARRHEA DUE TO MALABSORPTION: Primary | ICD-10-CM

## 2019-07-19 DIAGNOSIS — E03.9 HYPOTHYROIDISM, UNSPECIFIED TYPE: ICD-10-CM

## 2019-07-19 DIAGNOSIS — I11.9 HYPERTENSIVE HEART DISEASE WITHOUT HEART FAILURE: ICD-10-CM

## 2019-07-19 DIAGNOSIS — N18.30 CHRONIC KIDNEY DISEASE, STAGE III (MODERATE) (HCC): ICD-10-CM

## 2019-07-19 DIAGNOSIS — R13.19 ESOPHAGEAL DYSPHAGIA: ICD-10-CM

## 2019-07-19 PROBLEM — N17.9 ACUTE RENAL FAILURE (HCC): Status: RESOLVED | Noted: 2019-03-10 | Resolved: 2019-07-19

## 2019-07-19 PROCEDURE — 1090F PRES/ABSN URINE INCON ASSESS: CPT | Performed by: INTERNAL MEDICINE

## 2019-07-19 PROCEDURE — G8427 DOCREV CUR MEDS BY ELIG CLIN: HCPCS | Performed by: INTERNAL MEDICINE

## 2019-07-19 PROCEDURE — 4040F PNEUMOC VAC/ADMIN/RCVD: CPT | Performed by: INTERNAL MEDICINE

## 2019-07-19 PROCEDURE — G8420 CALC BMI NORM PARAMETERS: HCPCS | Performed by: INTERNAL MEDICINE

## 2019-07-19 PROCEDURE — 1123F ACP DISCUSS/DSCN MKR DOCD: CPT | Performed by: INTERNAL MEDICINE

## 2019-07-19 PROCEDURE — 99214 OFFICE O/P EST MOD 30 MIN: CPT | Performed by: INTERNAL MEDICINE

## 2019-07-19 PROCEDURE — 1036F TOBACCO NON-USER: CPT | Performed by: INTERNAL MEDICINE

## 2019-07-19 RX ORDER — QUINAPRIL 40 MG/1
40 TABLET ORAL 2 TIMES DAILY
Qty: 180 TABLET | Refills: 3
Start: 2019-07-19 | End: 2019-09-13 | Stop reason: SDUPTHER

## 2019-07-19 RX ORDER — L.ACID,PARA/B.BIFIDUM/S.THERM 8B CELL
1 CAPSULE ORAL DAILY
Qty: 90 CAPSULE | Refills: 3 | Status: SHIPPED | OUTPATIENT
Start: 2019-07-19 | End: 2021-08-12 | Stop reason: ALTCHOICE

## 2019-07-19 ASSESSMENT — ENCOUNTER SYMPTOMS
COUGH: 0
SHORTNESS OF BREATH: 0
EYE REDNESS: 0
SINUS PRESSURE: 0
EYE DISCHARGE: 0
ABDOMINAL DISTENTION: 0
ABDOMINAL PAIN: 0

## 2019-07-22 ENCOUNTER — TELEPHONE (OUTPATIENT)
Dept: INTERNAL MEDICINE | Age: 84
End: 2019-07-22

## 2019-09-13 ENCOUNTER — OFFICE VISIT (OUTPATIENT)
Dept: INTERNAL MEDICINE | Age: 84
End: 2019-09-13
Payer: MEDICARE

## 2019-09-13 VITALS
SYSTOLIC BLOOD PRESSURE: 142 MMHG | HEIGHT: 60 IN | WEIGHT: 107.6 LBS | BODY MASS INDEX: 21.13 KG/M2 | HEART RATE: 75 BPM | DIASTOLIC BLOOD PRESSURE: 80 MMHG | OXYGEN SATURATION: 99 %

## 2019-09-13 DIAGNOSIS — E03.9 HYPOTHYROIDISM, UNSPECIFIED TYPE: ICD-10-CM

## 2019-09-13 DIAGNOSIS — M10.9 ARTHRITIS DUE TO GOUT: ICD-10-CM

## 2019-09-13 DIAGNOSIS — N18.9 ANEMIA IN CHRONIC KIDNEY DISEASE, UNSPECIFIED CKD STAGE: ICD-10-CM

## 2019-09-13 DIAGNOSIS — E78.2 MIXED HYPERLIPIDEMIA: ICD-10-CM

## 2019-09-13 DIAGNOSIS — I11.9 HYPERTENSIVE HEART DISEASE WITHOUT HEART FAILURE: ICD-10-CM

## 2019-09-13 DIAGNOSIS — N18.9 CHRONIC KIDNEY DISEASE, UNSPECIFIED CKD STAGE: ICD-10-CM

## 2019-09-13 DIAGNOSIS — D50.0 IRON DEFICIENCY ANEMIA DUE TO CHRONIC BLOOD LOSS: Primary | ICD-10-CM

## 2019-09-13 DIAGNOSIS — K27.9 PUD (PEPTIC ULCER DISEASE): ICD-10-CM

## 2019-09-13 DIAGNOSIS — D63.1 ANEMIA IN CHRONIC KIDNEY DISEASE, UNSPECIFIED CKD STAGE: ICD-10-CM

## 2019-09-13 DIAGNOSIS — R21 RASH AND NONSPECIFIC SKIN ERUPTION: ICD-10-CM

## 2019-09-13 PROCEDURE — 90653 IIV ADJUVANT VACCINE IM: CPT | Performed by: INTERNAL MEDICINE

## 2019-09-13 PROCEDURE — G8420 CALC BMI NORM PARAMETERS: HCPCS | Performed by: INTERNAL MEDICINE

## 2019-09-13 PROCEDURE — G8427 DOCREV CUR MEDS BY ELIG CLIN: HCPCS | Performed by: INTERNAL MEDICINE

## 2019-09-13 PROCEDURE — 99214 OFFICE O/P EST MOD 30 MIN: CPT | Performed by: INTERNAL MEDICINE

## 2019-09-13 PROCEDURE — 1090F PRES/ABSN URINE INCON ASSESS: CPT | Performed by: INTERNAL MEDICINE

## 2019-09-13 PROCEDURE — G0008 ADMIN INFLUENZA VIRUS VAC: HCPCS | Performed by: INTERNAL MEDICINE

## 2019-09-13 PROCEDURE — 4040F PNEUMOC VAC/ADMIN/RCVD: CPT | Performed by: INTERNAL MEDICINE

## 2019-09-13 PROCEDURE — 1123F ACP DISCUSS/DSCN MKR DOCD: CPT | Performed by: INTERNAL MEDICINE

## 2019-09-13 PROCEDURE — 1036F TOBACCO NON-USER: CPT | Performed by: INTERNAL MEDICINE

## 2019-09-13 RX ORDER — QUINAPRIL 40 MG/1
40 TABLET ORAL 2 TIMES DAILY
Qty: 180 TABLET | Refills: 3 | Status: SHIPPED | OUTPATIENT
Start: 2019-09-13 | End: 2020-08-10

## 2019-09-13 RX ORDER — GLY/DIMETH/PETROLAT,WHT/WATER
CREAM (GRAM) TOPICAL
Qty: 1 BOTTLE | Refills: 5 | Status: SHIPPED | OUTPATIENT
Start: 2019-09-13 | End: 2021-08-12 | Stop reason: ALTCHOICE

## 2019-09-29 PROBLEM — K27.9 PUD (PEPTIC ULCER DISEASE): Status: ACTIVE | Noted: 2019-09-29

## 2019-09-29 ASSESSMENT — ENCOUNTER SYMPTOMS
EYE DISCHARGE: 0
ABDOMINAL DISTENTION: 0
EYE REDNESS: 0
ABDOMINAL PAIN: 0
SHORTNESS OF BREATH: 0
SINUS PRESSURE: 0
COUGH: 0

## 2019-12-11 ENCOUNTER — TELEPHONE (OUTPATIENT)
Dept: INTERNAL MEDICINE | Age: 84
End: 2019-12-11

## 2020-05-14 RX ORDER — LEVOTHYROXINE SODIUM 0.05 MG/1
TABLET ORAL
Qty: 90 TABLET | Refills: 3 | Status: SHIPPED | OUTPATIENT
Start: 2020-05-14 | End: 2021-04-15

## 2020-05-14 RX ORDER — ALLOPURINOL 300 MG/1
TABLET ORAL
Qty: 90 TABLET | Refills: 3 | Status: SHIPPED | OUTPATIENT
Start: 2020-05-14 | End: 2021-04-15

## 2020-07-23 NOTE — H&P
History is obtained from the patient, endorsement from ER physician and ER notes, and staff.     The patient was seen at 5:58 a.m. on 3/24/2017.     CHIEF COMPLAINT: The patient is not sure why she is here.     HISTORY OF PRESENT ILLNESS: The patient is a 89-year-old white female who denies having any medical problems. Apparently starting around 3:30 p.m. she came in with altered mental status. CT scan of her head  was negative. She is not a tPA candidate, if this were a stroke, because she presented outside the time window. Apparently, she had behavioral changes with confusion, disorientation and memory loss. By the time she was admitted she was doing better. Urinalysis was not yet back, but did come back eventually and did have a UTI for which we are treating with Rocephin. Family is not at bedside to give corroborative information.     In terms of other symptoms, the patient complains of headache. Denies any fever, vision changes, chest pain, shortness of breath, GI symptoms, blood in the stool or other neurological symptoms. Her NIH was 1 and she did pass her TOR-BSST study.    PAST MEDICAL HISTORY:  Negative per patient but records indicate she had squamous cell carcinoma, hypertension, fibromyalgia and arthritis.     PAST SURGICAL HISTORY:  1.  Appendectomy.   2.  Cataract extraction.  3.  Hysterectomy.   4.  Skin graft.  5.  Skin lesion excision.   6.  Tonsillectomy.     SOCIAL HISTORY: She denies any alcohol, drug or tobacco abuse.     FAMILY HISTORY: Patient denies any heart disease or strokes in the family.     ALLERGIES: BIAXIN.     MEDICATIONS:  1.  Allopurinol.  2.  Amlodipine.  3.  Quinapril.  4.  Lumigan.  5.  Synthroid.  6.  Atenolol.   7.  Topiramate.    REVIEW OF SYSTEMS: Otherwise complete review of systems is reviewed and negative except as mentioned in the history of present illness.     PHYSICAL EXAMINATION:  VITAL SIGNS: Temperature 98.1, pulse 83, respiratory rate 18, blood pressure  How Many Skin Cancers Have You Had?: one 141/56, O2 saturation 98%.  GENERAL:  Patient is awake, alert, oriented  in no distress.   HEENT: No scleral icterus. Normal pinnae and hearing. Moist oral mucosa.   NECK: No nuchal rigidity. No JVD or retractions.   HEART: S1, S2. No murmur, gallop, or rub.   LUNGS: Clear to auscultation bilaterally, normal respiratory effort.   ABDOMEN: Soft, nontender, nondistended. Positive bowel sounds. No mass, hepatosplenomegaly, rebound or hernia.   EXTREMITIES: No edema.   MUSCULOSKELETAL: Normal strength and tone in all 4 extremities.   DERMATOLOGIC:  No rash, diaphoresis, or jaundice.   NEUROLOGIC: Normal cranial nerves. Olfactory nerve not assessed. Normal symmetric reflexes bilaterally in brachioradialis, biceps and patella reflexes.   PSYCHIATRIC: The patient knows who she is, where she is. She knows that it is March but she does not know the year. She is clearly not oriented to circumstance either.     DIAGNOSTIC DATA: Sodium 140, potassium 17, chloride 106, bicarbonate 21, BUN and creatinine 30 and 1.52 (baseline is around 1.44 to 1.59 previously). AST and ALT are unremarkable. White count 8.87, hemoglobin 11.5, platelets were 238,000 with a normal differential. Urinalysis did show large leukocyte esterase, 21-30 WBCs and positive nitrites. Culture is pending. Chest x-ray was negative. Head CT showed no acute intracranial hemorrhage, midline shift or hydrocephalus but age-appropriate atrophy was seen. She had areas of diminished density in the periventricular subcortical white matter bilaterally nonspecific, however, likely represents chronic small vessel ischemic change.    ASSESSMENT AND PLAN:  1.  Altered mental status - most likely due to her urinary tract infection. We will continue IV Rocephin and follow up urine culture. For completeness sake, we will obtain an MRI of her brain.   2.  Hypertension. We will allow permissive hypertension in the event that this is a stroke. Continue neurological checks, IV  What Is The Reason For Today's Visit?: History of Non-Melanoma Skin Cancer hydralazine as needed.   3.  Chronic kidney disease. The patient's creatinine is baseline. Continue to monitor.   4.  History of hypothyroidism. Will check TSH.     cc:               Yuridia Goldberg M.D.  /[id number]  D:  03/24/2017 07:03:13(Eastern Time)  T:  03/24/2017 07:23:09(Eastern Time)  Voice ID:  [id number]/Document ID:  [id number]

## 2020-08-10 RX ORDER — QUINAPRIL 40 MG/1
TABLET ORAL
Qty: 180 TABLET | Refills: 3 | Status: SHIPPED | OUTPATIENT
Start: 2020-08-10 | End: 2021-07-16

## 2020-10-08 ENCOUNTER — PROCEDURE (OUTPATIENT)
Dept: URBAN - METROPOLITAN AREA CLINIC 31 | Facility: CLINIC | Age: 85
End: 2020-10-08
Payer: MEDICARE

## 2020-10-08 PROCEDURE — 67028 INJECTION EYE DRUG: CPT | Performed by: OPHTHALMOLOGY

## 2020-10-08 ASSESSMENT — INTRAOCULAR PRESSURE
OS: 12
OD: 15

## 2020-11-05 ENCOUNTER — OFFICE VISIT (OUTPATIENT)
Dept: URBAN - METROPOLITAN AREA CLINIC 31 | Facility: CLINIC | Age: 85
End: 2020-11-05
Payer: MEDICARE

## 2020-11-05 PROCEDURE — 67028 INJECTION EYE DRUG: CPT | Performed by: OPHTHALMOLOGY

## 2020-11-05 ASSESSMENT — INTRAOCULAR PRESSURE
OS: 13
OD: 15

## 2020-12-03 ENCOUNTER — OFFICE VISIT (OUTPATIENT)
Dept: URBAN - METROPOLITAN AREA CLINIC 31 | Facility: CLINIC | Age: 85
End: 2020-12-03
Payer: MEDICARE

## 2020-12-03 PROCEDURE — 92134 CPTRZ OPH DX IMG PST SGM RTA: CPT | Performed by: OPHTHALMOLOGY

## 2020-12-03 PROCEDURE — 67028 INJECTION EYE DRUG: CPT | Performed by: OPHTHALMOLOGY

## 2020-12-03 PROCEDURE — 92014 COMPRE OPH EXAM EST PT 1/>: CPT | Performed by: OPHTHALMOLOGY

## 2020-12-03 ASSESSMENT — INTRAOCULAR PRESSURE
OS: 12
OD: 14

## 2020-12-03 NOTE — IMPRESSION/PLAN
Impression: Exdtve age-rel mclr degn, right eye, with actv chrdl neovas: H35.3211 OD. Status: Symptomatic.
-s/p Avastin last 08/31/17
-s/p Eylea last 11/05/2020
-s/p Beovu last 03/19/2020
worse at ~5 wks Plan: Exam and OCT reveal PED with recurrence of SRF at 5 weeks with treatment of Eylea. Vision has worsened today. Will taper visits back to 4 weeks given the decline in vision and recommend switching back to Beovu at next visit given patients good response in the past.  R/B/A's discussed. Patient elects to proceed. Intravitreal Eylea injection performed into the right eye without complication.  

RTC: 4 weeks Straight Beovu #1/1 OD w/ OCT OU

## 2020-12-03 NOTE — IMPRESSION/PLAN
Impression: Presence of pseudophakia: Z96. 1.OU Plan: Patient is using new MRx. Discussed that fit may need to be adjusted so that she can use the correct part of the glasses for distance vision.

## 2020-12-03 NOTE — IMPRESSION/PLAN
Impression: Nexdtve age-related mclr degn, left eye, intermed dry stage: H35.3122. OS. Status: Chronic. Plan: Patient notes mild distortion in vision. Exam and OCT demonstrate stable drusen and RPE changes confirming AMD is still dry. The patient was advised to continue AREDS 2 vitamin supplements; the risk of smoking was emphasized with the patient. The patient was also advised to continue to use an [address] to monitor the vision and to call immediately with any changes. Will closely monitor.

## 2020-12-31 ENCOUNTER — PROCEDURE (OUTPATIENT)
Dept: URBAN - METROPOLITAN AREA CLINIC 31 | Facility: CLINIC | Age: 85
End: 2020-12-31
Payer: MEDICARE

## 2020-12-31 PROCEDURE — 67028 INJECTION EYE DRUG: CPT | Performed by: OPHTHALMOLOGY

## 2020-12-31 ASSESSMENT — INTRAOCULAR PRESSURE
OS: 14
OD: 15

## 2021-01-28 ENCOUNTER — OFFICE VISIT (OUTPATIENT)
Dept: URBAN - METROPOLITAN AREA CLINIC 31 | Facility: CLINIC | Age: 86
End: 2021-01-28
Payer: MEDICARE

## 2021-01-28 DIAGNOSIS — Z96.1 PRESENCE OF PSEUDOPHAKIA: ICD-10-CM

## 2021-01-28 PROCEDURE — 92134 CPTRZ OPH DX IMG PST SGM RTA: CPT | Performed by: OPHTHALMOLOGY

## 2021-01-28 PROCEDURE — 92012 INTRM OPH EXAM EST PATIENT: CPT | Performed by: OPHTHALMOLOGY

## 2021-01-28 PROCEDURE — 67028 INJECTION EYE DRUG: CPT | Performed by: OPHTHALMOLOGY

## 2021-01-28 ASSESSMENT — INTRAOCULAR PRESSURE
OD: 13
OS: 14

## 2021-01-28 NOTE — IMPRESSION/PLAN
Impression: Exdtve age-rel mclr degn, right eye, with actv chrdl neovas: H35.3211 OD. Status: Symptomatic.
-s/p Avastin last 08/31/17
-s/p Eylea last 11/05/2020
-s/p Beovu last 12/31/2020
worse at ~5 wks Plan: Exam and OCT reveal PED with atrophy; there has been no improvement in vision after trial of Beovu. Will taper visits back to 4 weeks and continue with Eylea injections. R/B/A's discussed. Patient elects to proceed. Intravitreal Eylea injection performed into the right eye without complication.  

RTC: 4 weeks Straight Eylea OD #1/3 OD w/ OCT OU

## 2021-01-28 NOTE — IMPRESSION/PLAN
Impression: Nexdtve age-related mclr degn, left eye, intermed dry stage: H35.3122. OS. Status: Chronic. Plan: Patient notes mild distortion in vision. Exam and OCT demonstrate stable drusen and RPE changes confirming AMD is still dry. The patient was advised to continue AREDS 2 vitamin supplements; the risk of smoking was emphasized with the patient. The patient was also advised to continue to use an [address] to monitor the vision and to call immediately with any changes. Will closely monitor.

## 2021-01-30 NOTE — PROGRESS NOTES
Procedure   Procedures    Preprocedure diagnosis: An ulceration of the scalp    Post procedure diagnosis: Same    Procedure: Punch biopsy    Details:  Patient consent was obtained.  The skin was cleansed with alcohol.  The skin was infiltrated with 1 mL of 1% lidocaine with 1-100,000 epinephrine.  After approximately 10 minutes, a 2 millimeter punch biopsy was taken by placing circular motion on the biopsy tool, the skin was elevated and clipped with iris scissors.  The specimen was sent in formalin.  The skin was not closed and will heal by secondary intention.  Antibiotic ointment was placed over the biopsy site.  The patient tolerated the procedure with minimal discomfort.    Tyree Flanagan MD   01/18/17  10:36 AM     English

## 2021-02-20 ENCOUNTER — IMMUNIZATION (OUTPATIENT)
Age: 86
End: 2021-02-20
Payer: MEDICARE

## 2021-02-20 PROCEDURE — 0001A COVID-19, PFIZER VACCINE 30MCG/0.3ML DOSE: CPT | Performed by: FAMILY MEDICINE

## 2021-02-20 PROCEDURE — 91300 COVID-19, PFIZER VACCINE 30MCG/0.3ML DOSE: CPT | Performed by: FAMILY MEDICINE

## 2021-02-25 ENCOUNTER — PROCEDURE (OUTPATIENT)
Dept: URBAN - METROPOLITAN AREA CLINIC 31 | Facility: CLINIC | Age: 86
End: 2021-02-25
Payer: MEDICARE

## 2021-02-25 PROCEDURE — 67028 INJECTION EYE DRUG: CPT | Performed by: OPHTHALMOLOGY

## 2021-02-25 PROCEDURE — 92134 CPTRZ OPH DX IMG PST SGM RTA: CPT | Performed by: OPHTHALMOLOGY

## 2021-02-25 ASSESSMENT — INTRAOCULAR PRESSURE
OD: 13
OS: 12

## 2021-03-13 ENCOUNTER — IMMUNIZATION (OUTPATIENT)
Age: 86
End: 2021-03-13
Payer: MEDICARE

## 2021-03-13 PROCEDURE — 0002A PR IMM ADMN SARSCOV2 30MCG/0.3ML DIL RECON 2ND DOSE: CPT | Performed by: FAMILY MEDICINE

## 2021-03-13 PROCEDURE — 91300 COVID-19, PFIZER VACCINE 30MCG/0.3ML DOSE: CPT | Performed by: FAMILY MEDICINE

## 2021-03-25 ENCOUNTER — OFFICE VISIT (OUTPATIENT)
Dept: URBAN - METROPOLITAN AREA CLINIC 31 | Facility: CLINIC | Age: 86
End: 2021-03-25
Payer: MEDICARE

## 2021-03-25 PROCEDURE — 92134 CPTRZ OPH DX IMG PST SGM RTA: CPT | Performed by: OPHTHALMOLOGY

## 2021-03-25 PROCEDURE — 67028 INJECTION EYE DRUG: CPT | Performed by: OPHTHALMOLOGY

## 2021-03-25 ASSESSMENT — INTRAOCULAR PRESSURE
OS: 11
OD: 14

## 2021-04-15 DIAGNOSIS — E03.9 HYPOTHYROIDISM, UNSPECIFIED TYPE: ICD-10-CM

## 2021-04-15 DIAGNOSIS — N18.9 ANEMIA IN CHRONIC KIDNEY DISEASE, UNSPECIFIED CKD STAGE: ICD-10-CM

## 2021-04-15 DIAGNOSIS — D63.1 ANEMIA IN CHRONIC KIDNEY DISEASE, UNSPECIFIED CKD STAGE: ICD-10-CM

## 2021-04-15 DIAGNOSIS — I11.9 HYPERTENSIVE HEART DISEASE WITHOUT HEART FAILURE: ICD-10-CM

## 2021-04-15 DIAGNOSIS — N18.9 CHRONIC KIDNEY DISEASE, UNSPECIFIED CKD STAGE: ICD-10-CM

## 2021-04-15 DIAGNOSIS — M10.9 ARTHRITIS DUE TO GOUT: ICD-10-CM

## 2021-04-15 RX ORDER — LEVOTHYROXINE SODIUM 0.05 MG/1
TABLET ORAL
Qty: 30 TABLET | Refills: 1 | Status: SHIPPED | OUTPATIENT
Start: 2021-04-15 | End: 2021-04-23 | Stop reason: SDUPTHER

## 2021-04-15 RX ORDER — ALLOPURINOL 300 MG/1
TABLET ORAL
Qty: 30 TABLET | Refills: 1 | Status: SHIPPED | OUTPATIENT
Start: 2021-04-15 | End: 2021-04-23 | Stop reason: SDUPTHER

## 2021-04-23 DIAGNOSIS — E03.9 HYPOTHYROIDISM, UNSPECIFIED TYPE: ICD-10-CM

## 2021-04-23 DIAGNOSIS — D63.1 ANEMIA IN CHRONIC KIDNEY DISEASE, UNSPECIFIED CKD STAGE: ICD-10-CM

## 2021-04-23 DIAGNOSIS — N18.9 ANEMIA IN CHRONIC KIDNEY DISEASE, UNSPECIFIED CKD STAGE: ICD-10-CM

## 2021-04-23 DIAGNOSIS — M10.9 ARTHRITIS DUE TO GOUT: ICD-10-CM

## 2021-04-23 DIAGNOSIS — I11.9 HYPERTENSIVE HEART DISEASE WITHOUT HEART FAILURE: ICD-10-CM

## 2021-04-23 DIAGNOSIS — N18.9 CHRONIC KIDNEY DISEASE, UNSPECIFIED CKD STAGE: ICD-10-CM

## 2021-04-23 RX ORDER — LEVOTHYROXINE SODIUM 0.05 MG/1
TABLET ORAL
Qty: 90 TABLET | Refills: 3 | Status: SHIPPED | OUTPATIENT
Start: 2021-04-23 | End: 2022-07-08

## 2021-04-23 RX ORDER — ALLOPURINOL 300 MG/1
TABLET ORAL
Qty: 90 TABLET | Refills: 3 | Status: SHIPPED | OUTPATIENT
Start: 2021-04-23 | End: 2022-07-08

## 2021-05-06 ENCOUNTER — PROCEDURE (OUTPATIENT)
Dept: URBAN - METROPOLITAN AREA CLINIC 31 | Facility: CLINIC | Age: 86
End: 2021-05-06
Payer: MEDICARE

## 2021-05-06 PROCEDURE — 67028 INJECTION EYE DRUG: CPT | Performed by: OPHTHALMOLOGY

## 2021-05-06 PROCEDURE — 92134 CPTRZ OPH DX IMG PST SGM RTA: CPT | Performed by: OPHTHALMOLOGY

## 2021-05-06 ASSESSMENT — INTRAOCULAR PRESSURE
OD: 14
OS: 15

## 2021-05-19 PROBLEM — R41.82 ALTERED MENTAL STATUS: Status: ACTIVE | Noted: 2017-03-24

## 2021-05-25 DIAGNOSIS — N18.31 STAGE 3A CHRONIC KIDNEY DISEASE (HCC): ICD-10-CM

## 2021-05-25 DIAGNOSIS — I11.9 HYPERTENSIVE HEART DISEASE WITHOUT HEART FAILURE: ICD-10-CM

## 2021-05-25 DIAGNOSIS — E03.9 HYPOTHYROIDISM, UNSPECIFIED TYPE: ICD-10-CM

## 2021-05-25 DIAGNOSIS — N18.9 ANEMIA DUE TO CHRONIC KIDNEY DISEASE, UNSPECIFIED CKD STAGE: Primary | ICD-10-CM

## 2021-05-25 DIAGNOSIS — D63.1 ANEMIA DUE TO CHRONIC KIDNEY DISEASE, UNSPECIFIED CKD STAGE: Primary | ICD-10-CM

## 2021-05-25 DIAGNOSIS — E78.2 MIXED HYPERLIPIDEMIA: ICD-10-CM

## 2021-06-03 ENCOUNTER — OFFICE VISIT (OUTPATIENT)
Dept: URBAN - METROPOLITAN AREA CLINIC 31 | Facility: CLINIC | Age: 86
End: 2021-06-03
Payer: MEDICARE

## 2021-06-03 DIAGNOSIS — H35.3122 NEXDTVE AGE-RELATED MCLR DEGN, LEFT EYE, INTERMED DRY STAGE: Primary | ICD-10-CM

## 2021-06-03 PROCEDURE — 67028 INJECTION EYE DRUG: CPT | Performed by: OPHTHALMOLOGY

## 2021-06-03 PROCEDURE — 92134 CPTRZ OPH DX IMG PST SGM RTA: CPT | Performed by: OPHTHALMOLOGY

## 2021-06-03 PROCEDURE — 92012 INTRM OPH EXAM EST PATIENT: CPT | Performed by: OPHTHALMOLOGY

## 2021-06-03 ASSESSMENT — INTRAOCULAR PRESSURE
OD: 17
OS: 12

## 2021-06-03 NOTE — IMPRESSION/PLAN
Impression: Exdtve age-rel mclr degn, right eye, with actv chrdl neovas: H35.3211 OD. Status: Symptomatic.
-s/p Avastin last 08/31/17
-s/p Eylea last 05/06/2021
-s/p Beovu last 12/31/2020
worse at ~5 wks Plan: Exam and OCT reveal PED with atrophy; there has been no improvement in vision after trial of Beovu. Will taper visits back to 4 weeks and continue with Eylea injections. R/B/A's discussed. Patient elects to proceed. Intravitreal Eylea injection performed into the right eye without complication.  

RTC: 4 weeks OCT OU; STRAIGHT EYLEA OD #1/3

## 2021-06-03 NOTE — IMPRESSION/PLAN
Impression: Nexdtve age-related mclr degn, left eye, intermed dry stage: H35.3122. OS. Status: Chronic. Plan: Patient notes mild distortion in vision. Exam and OCT demonstrate stable drusen and RPE changes confirming AMD is still dry. The patient was advised to continue AREDS 2 vitamin supplements; the risk of smoking was emphasized with the patient. The patient was also advised to continue to use an [address] to monitor the vision and to call immediately with any changes. Will closely monitor.

## 2021-07-01 ENCOUNTER — PROCEDURE (OUTPATIENT)
Dept: URBAN - METROPOLITAN AREA CLINIC 31 | Facility: CLINIC | Age: 86
End: 2021-07-01
Payer: MEDICARE

## 2021-07-01 PROCEDURE — 92134 CPTRZ OPH DX IMG PST SGM RTA: CPT | Performed by: OPHTHALMOLOGY

## 2021-07-01 PROCEDURE — 67028 INJECTION EYE DRUG: CPT | Performed by: OPHTHALMOLOGY

## 2021-07-01 ASSESSMENT — INTRAOCULAR PRESSURE
OD: 7
OS: 8

## 2021-07-15 DIAGNOSIS — N18.9 ANEMIA IN CHRONIC KIDNEY DISEASE, UNSPECIFIED CKD STAGE: ICD-10-CM

## 2021-07-15 DIAGNOSIS — E03.9 HYPOTHYROIDISM, UNSPECIFIED TYPE: ICD-10-CM

## 2021-07-15 DIAGNOSIS — M10.9 ARTHRITIS DUE TO GOUT: ICD-10-CM

## 2021-07-15 DIAGNOSIS — D63.1 ANEMIA IN CHRONIC KIDNEY DISEASE, UNSPECIFIED CKD STAGE: ICD-10-CM

## 2021-07-15 DIAGNOSIS — I11.9 HYPERTENSIVE HEART DISEASE WITHOUT HEART FAILURE: ICD-10-CM

## 2021-07-15 DIAGNOSIS — N18.9 CHRONIC KIDNEY DISEASE, UNSPECIFIED CKD STAGE: ICD-10-CM

## 2021-07-16 RX ORDER — QUINAPRIL 40 MG/1
TABLET ORAL
Qty: 180 TABLET | Refills: 3 | Status: SHIPPED | OUTPATIENT
Start: 2021-07-16 | End: 2022-09-29

## 2021-07-29 ENCOUNTER — PROCEDURE (OUTPATIENT)
Dept: URBAN - METROPOLITAN AREA CLINIC 31 | Facility: CLINIC | Age: 86
End: 2021-07-29
Payer: MEDICARE

## 2021-07-29 DIAGNOSIS — H35.3211 EXUDATIVE AGE-RELATED MACULAR DEGENERATION, RIGHT EYE, WITH ACTIVE CHOROIDAL NEOVASCULARIZATION: Primary | ICD-10-CM

## 2021-07-29 PROCEDURE — 92134 CPTRZ OPH DX IMG PST SGM RTA: CPT | Performed by: OPHTHALMOLOGY

## 2021-07-29 PROCEDURE — 67028 INJECTION EYE DRUG: CPT | Performed by: OPHTHALMOLOGY

## 2021-07-29 ASSESSMENT — INTRAOCULAR PRESSURE
OD: 15
OS: 16

## 2021-08-02 DIAGNOSIS — E78.2 MIXED HYPERLIPIDEMIA: ICD-10-CM

## 2021-08-02 DIAGNOSIS — I11.9 HYPERTENSIVE HEART DISEASE WITHOUT HEART FAILURE: ICD-10-CM

## 2021-08-02 DIAGNOSIS — E03.9 HYPOTHYROIDISM, UNSPECIFIED TYPE: ICD-10-CM

## 2021-08-02 DIAGNOSIS — N18.31 STAGE 3A CHRONIC KIDNEY DISEASE (HCC): ICD-10-CM

## 2021-08-02 DIAGNOSIS — N18.9 ANEMIA DUE TO CHRONIC KIDNEY DISEASE, UNSPECIFIED CKD STAGE: ICD-10-CM

## 2021-08-02 DIAGNOSIS — D63.1 ANEMIA DUE TO CHRONIC KIDNEY DISEASE, UNSPECIFIED CKD STAGE: ICD-10-CM

## 2021-08-02 LAB
ALBUMIN SERPL-MCNC: 4.4 G/DL (ref 3.5–5.2)
ALP BLD-CCNC: 75 U/L (ref 35–104)
ALT SERPL-CCNC: 11 U/L (ref 5–33)
ANION GAP SERPL CALCULATED.3IONS-SCNC: 12 MMOL/L (ref 7–19)
AST SERPL-CCNC: 20 U/L (ref 5–32)
BASOPHILS ABSOLUTE: 0.1 K/UL (ref 0–0.2)
BASOPHILS RELATIVE PERCENT: 1.1 % (ref 0–1)
BILIRUB SERPL-MCNC: 0.3 MG/DL (ref 0.2–1.2)
BUN BLDV-MCNC: 26 MG/DL (ref 8–23)
CALCIUM SERPL-MCNC: 10.5 MG/DL (ref 8.2–9.6)
CHLORIDE BLD-SCNC: 96 MMOL/L (ref 98–111)
CHOLESTEROL, FASTING: 241 MG/DL (ref 160–199)
CO2: 29 MMOL/L (ref 22–29)
CREAT SERPL-MCNC: 1.5 MG/DL (ref 0.5–0.9)
EOSINOPHILS ABSOLUTE: 0.4 K/UL (ref 0–0.6)
EOSINOPHILS RELATIVE PERCENT: 4.4 % (ref 0–5)
FERRITIN: 58.4 NG/ML (ref 13–150)
FOLATE: >20 NG/ML (ref 4.8–37.3)
GFR AFRICAN AMERICAN: 39
GFR NON-AFRICAN AMERICAN: 32
GLUCOSE BLD-MCNC: 97 MG/DL (ref 74–109)
HCT VFR BLD CALC: 40.5 % (ref 37–47)
HDLC SERPL-MCNC: 55 MG/DL (ref 65–121)
HEMOGLOBIN: 12.9 G/DL (ref 12–16)
IMMATURE GRANULOCYTES #: 0.1 K/UL
IRON SATURATION: 26 % (ref 14–50)
IRON: 78 UG/DL (ref 37–145)
LDL CHOLESTEROL CALCULATED: 139 MG/DL
LYMPHOCYTES ABSOLUTE: 1.6 K/UL (ref 1.1–4.5)
LYMPHOCYTES RELATIVE PERCENT: 19.3 % (ref 20–40)
MCH RBC QN AUTO: 31 PG (ref 27–31)
MCHC RBC AUTO-ENTMCNC: 31.9 G/DL (ref 33–37)
MCV RBC AUTO: 97.4 FL (ref 81–99)
MONOCYTES ABSOLUTE: 0.9 K/UL (ref 0–0.9)
MONOCYTES RELATIVE PERCENT: 11.4 % (ref 0–10)
NEUTROPHILS ABSOLUTE: 5.1 K/UL (ref 1.5–7.5)
NEUTROPHILS RELATIVE PERCENT: 62.9 % (ref 50–65)
PDW BLD-RTO: 14.1 % (ref 11.5–14.5)
PLATELET # BLD: 323 K/UL (ref 130–400)
PMV BLD AUTO: 9.3 FL (ref 9.4–12.3)
POTASSIUM SERPL-SCNC: 4.7 MMOL/L (ref 3.5–5)
RBC # BLD: 4.16 M/UL (ref 4.2–5.4)
SODIUM BLD-SCNC: 137 MMOL/L (ref 136–145)
TOTAL IRON BINDING CAPACITY: 301 UG/DL (ref 250–400)
TOTAL PROTEIN: 7.6 G/DL (ref 6.6–8.7)
TRIGLYCERIDE, FASTING: 236 MG/DL (ref 0–149)
TSH SERPL DL<=0.05 MIU/L-ACNC: 3.49 UIU/ML (ref 0.27–4.2)
VITAMIN B-12: 959 PG/ML (ref 211–946)
WBC # BLD: 8.1 K/UL (ref 4.8–10.8)

## 2021-08-12 ENCOUNTER — OFFICE VISIT (OUTPATIENT)
Dept: INTERNAL MEDICINE | Age: 86
End: 2021-08-12
Payer: MEDICARE

## 2021-08-12 VITALS
SYSTOLIC BLOOD PRESSURE: 118 MMHG | WEIGHT: 117 LBS | BODY MASS INDEX: 22.97 KG/M2 | DIASTOLIC BLOOD PRESSURE: 78 MMHG | HEART RATE: 80 BPM | HEIGHT: 60 IN | OXYGEN SATURATION: 95 %

## 2021-08-12 DIAGNOSIS — I10 ESSENTIAL HYPERTENSION: ICD-10-CM

## 2021-08-12 DIAGNOSIS — E78.2 MIXED HYPERLIPIDEMIA: ICD-10-CM

## 2021-08-12 DIAGNOSIS — Z00.00 ROUTINE GENERAL MEDICAL EXAMINATION AT A HEALTH CARE FACILITY: ICD-10-CM

## 2021-08-12 DIAGNOSIS — I11.9 HYPERTENSIVE HEART DISEASE WITHOUT HEART FAILURE: ICD-10-CM

## 2021-08-12 DIAGNOSIS — N18.9 ANEMIA IN CHRONIC KIDNEY DISEASE, UNSPECIFIED CKD STAGE: ICD-10-CM

## 2021-08-12 DIAGNOSIS — Z00.00 MEDICARE ANNUAL WELLNESS VISIT, SUBSEQUENT: Primary | ICD-10-CM

## 2021-08-12 DIAGNOSIS — E03.9 HYPOTHYROIDISM, UNSPECIFIED TYPE: ICD-10-CM

## 2021-08-12 DIAGNOSIS — D63.1 ANEMIA IN CHRONIC KIDNEY DISEASE, UNSPECIFIED CKD STAGE: ICD-10-CM

## 2021-08-12 DIAGNOSIS — K27.9 PUD (PEPTIC ULCER DISEASE): ICD-10-CM

## 2021-08-12 DIAGNOSIS — N18.9 CHRONIC KIDNEY DISEASE, UNSPECIFIED CKD STAGE: ICD-10-CM

## 2021-08-12 DIAGNOSIS — M10.9 ARTHRITIS DUE TO GOUT: ICD-10-CM

## 2021-08-12 DIAGNOSIS — N18.32 STAGE 3B CHRONIC KIDNEY DISEASE (HCC): ICD-10-CM

## 2021-08-12 PROCEDURE — G8427 DOCREV CUR MEDS BY ELIG CLIN: HCPCS | Performed by: INTERNAL MEDICINE

## 2021-08-12 PROCEDURE — 1123F ACP DISCUSS/DSCN MKR DOCD: CPT | Performed by: INTERNAL MEDICINE

## 2021-08-12 PROCEDURE — 4040F PNEUMOC VAC/ADMIN/RCVD: CPT | Performed by: INTERNAL MEDICINE

## 2021-08-12 PROCEDURE — 99213 OFFICE O/P EST LOW 20 MIN: CPT | Performed by: INTERNAL MEDICINE

## 2021-08-12 PROCEDURE — 1090F PRES/ABSN URINE INCON ASSESS: CPT | Performed by: INTERNAL MEDICINE

## 2021-08-12 PROCEDURE — 4004F PT TOBACCO SCREEN RCVD TLK: CPT | Performed by: INTERNAL MEDICINE

## 2021-08-12 PROCEDURE — G8420 CALC BMI NORM PARAMETERS: HCPCS | Performed by: INTERNAL MEDICINE

## 2021-08-12 PROCEDURE — G0439 PPPS, SUBSEQ VISIT: HCPCS | Performed by: INTERNAL MEDICINE

## 2021-08-12 RX ORDER — CLONIDINE HYDROCHLORIDE 0.1 MG/1
0.1 TABLET ORAL DAILY PRN
Qty: 90 TABLET | Refills: 3 | Status: SHIPPED | OUTPATIENT
Start: 2021-08-12 | End: 2022-07-12

## 2021-08-12 SDOH — ECONOMIC STABILITY: FOOD INSECURITY: WITHIN THE PAST 12 MONTHS, YOU WORRIED THAT YOUR FOOD WOULD RUN OUT BEFORE YOU GOT MONEY TO BUY MORE.: NEVER TRUE

## 2021-08-12 SDOH — ECONOMIC STABILITY: FOOD INSECURITY: WITHIN THE PAST 12 MONTHS, THE FOOD YOU BOUGHT JUST DIDN'T LAST AND YOU DIDN'T HAVE MONEY TO GET MORE.: NEVER TRUE

## 2021-08-12 ASSESSMENT — PATIENT HEALTH QUESTIONNAIRE - PHQ9
SUM OF ALL RESPONSES TO PHQ QUESTIONS 1-9: 0
2. FEELING DOWN, DEPRESSED OR HOPELESS: 0
SUM OF ALL RESPONSES TO PHQ QUESTIONS 1-9: 0
SUM OF ALL RESPONSES TO PHQ9 QUESTIONS 1 & 2: 0
SUM OF ALL RESPONSES TO PHQ QUESTIONS 1-9: 0
1. LITTLE INTEREST OR PLEASURE IN DOING THINGS: 0

## 2021-08-12 ASSESSMENT — ENCOUNTER SYMPTOMS
SINUS PRESSURE: 0
ABDOMINAL DISTENTION: 0
COUGH: 0
EYE REDNESS: 0
EYE DISCHARGE: 0
SHORTNESS OF BREATH: 0
ABDOMINAL PAIN: 0

## 2021-08-12 ASSESSMENT — SOCIAL DETERMINANTS OF HEALTH (SDOH): HOW HARD IS IT FOR YOU TO PAY FOR THE VERY BASICS LIKE FOOD, HOUSING, MEDICAL CARE, AND HEATING?: SOMEWHAT HARD

## 2021-08-12 ASSESSMENT — LIFESTYLE VARIABLES: HOW OFTEN DO YOU HAVE A DRINK CONTAINING ALCOHOL: 0

## 2021-08-12 NOTE — PROGRESS NOTES
Chief Complaint   Patient presents with    Medicare AWV    Hypertension    Hypothyroidism       HPI: Patient is here today for Medicare annual wellness visit I have not seen her in about 2 years due to the pandemic. She is 80 she is doing amazingly well she was having diarrhea and nausea before but we found she was taking a lot of aspirin and NSAIDs these were discontinued she is done so much better not having nausea and not having abdominal pain very rare if she does. She feels okay has not had as many headaches no chest pressure no chest pain no dyspnea. 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     Chronic kidney disease     Eczema     Fibrocystic breast disease (FCBD)     Fibromyalgia     Hypertensive heart disease     Migraine     Mixed hyperlipidemia     Osteoarthritis     Skin cancer     Dr Do, rt ant tibial region, 9/07, 1/16 Dr Dorinda Tran       Past Surgical History:   Procedure Laterality Date    APPENDECTOMY      COLONOSCOPY      HYSTERECTOMY         Family History   Problem Relation Age of Onset    Other Mother         thrombocytopenia    Other Father         skin cancer       Social History     Socioeconomic History    Marital status:       Spouse name: Not on file    Number of children: Not on file    Years of education: Not on file    Highest education level: Not on file   Occupational History    Not on file   Tobacco Use    Smoking status: Former Smoker    Smokeless tobacco: Never Used   Substance and Sexual Activity    Alcohol use: Not on file    Drug use: Not on file    Sexual activity: Not on file   Other Topics Concern    Not on file   Social History Narrative    Not on file     Social Determinants of Health     Financial Resource Strain: Medium Risk    Difficulty of Paying Living Expenses: Somewhat hard   Food Insecurity: No Food Insecurity    Worried About Running Out of Food in the Last Year: Never true   [employer] pain, palpitations and leg swelling. Gastrointestinal: Negative for abdominal distention and abdominal pain. Genitourinary: Negative for dysuria, frequency and urgency. Musculoskeletal: Positive for arthralgias. Skin: Negative for rash and wound. Neurological: Negative for dizziness and light-headedness. Psychiatric/Behavioral: Negative for dysphoric mood and sleep disturbance. The patient is not nervous/anxious. /78   Pulse 80   Ht 5' (1.524 m)   Wt 117 lb (53.1 kg)   SpO2 95%   BMI 22.85 kg/m²   BP Readings from Last 7 Encounters:   08/12/21 118/78   09/13/19 (!) 142/80   07/19/19 (!) 170/80   06/27/19 (!) 90/48   06/05/19 98/64   05/20/19 100/70   05/14/19 (!) 146/76     Wt Readings from Last 7 Encounters:   08/12/21 117 lb (53.1 kg)   09/13/19 107 lb 9.6 oz (48.8 kg)   07/19/19 109 lb 12.8 oz (49.8 kg)   06/27/19 107 lb (48.5 kg)   06/05/19 108 lb (49 kg)   05/20/19 109 lb (49.4 kg)   05/14/19 109 lb (49.4 kg)     BMI Readings from Last 7 Encounters:   08/12/21 22.85 kg/m²   09/13/19 21.01 kg/m²   07/19/19 21.44 kg/m²   06/27/19 20.90 kg/m²   06/05/19 21.09 kg/m²   05/20/19 21.29 kg/m²   05/14/19 21.29 kg/m²     Resp Readings from Last 7 Encounters:   06/05/19 20       Physical Exam  Constitutional:       General: She is not in acute distress. Appearance: Normal appearance. She is well-developed. HENT:      Right Ear: External ear normal. Tympanic membrane is not injected. Left Ear: External ear normal. Tympanic membrane is not injected. Mouth/Throat:      Pharynx: No oropharyngeal exudate. Eyes:      General: No scleral icterus. Conjunctiva/sclera: Conjunctivae normal.   Neck:      Thyroid: No thyroid mass or thyromegaly. Vascular: No carotid bruit. Cardiovascular:      Rate and Rhythm: Normal rate and regular rhythm. Heart sounds: S1 normal and S2 normal. No murmur heard. No S3 or S4 sounds.     Pulmonary:      Effort: Pulmonary effort is normal. No respiratory distress. Breath sounds: Normal breath sounds. No wheezing or rales. Abdominal:      General: Bowel sounds are normal. There is no distension. Palpations: Abdomen is soft. There is no mass. Tenderness: There is no abdominal tenderness. Musculoskeletal:      Cervical back: Neck supple. Right lower leg: No edema. Left lower leg: No edema. Comments: Chronic arthritis changes    Lymphadenopathy:      Cervical: No cervical adenopathy. Upper Body:      Right upper body: No supraclavicular adenopathy. Left upper body: No supraclavicular adenopathy. Skin:     Findings: Bruising present. No rash. Comments: Thinning of hair   Neurological:      Mental Status: She is alert and oriented to person, place, and time. Cranial Nerves: No cranial nerve deficit.    Psychiatric:         Mood and Affect: Mood normal.         Results for orders placed or performed in visit on 08/02/21   Comprehensive Metabolic Panel   Result Value Ref Range    Sodium 137 136 - 145 mmol/L    Potassium 4.7 3.5 - 5.0 mmol/L    Chloride 96 (L) 98 - 111 mmol/L    CO2 29 22 - 29 mmol/L    Anion Gap 12 7 - 19 mmol/L    Glucose 97 74 - 109 mg/dL    BUN 26 (H) 8 - 23 mg/dL    CREATININE 1.5 (H) 0.5 - 0.9 mg/dL    GFR Non- 32 (A) >60    GFR  39 (L) >59    Calcium 10.5 (H) 8.2 - 9.6 mg/dL    Total Protein 7.6 6.6 - 8.7 g/dL    Albumin 4.4 3.5 - 5.2 g/dL    Total Bilirubin 0.3 0.2 - 1.2 mg/dL    Alkaline Phosphatase 75 35 - 104 U/L    ALT 11 5 - 33 U/L    AST 20 5 - 32 U/L   CBC Auto Differential   Result Value Ref Range    WBC 8.1 4.8 - 10.8 K/uL    RBC 4.16 (L) 4.20 - 5.40 M/uL    Hemoglobin 12.9 12.0 - 16.0 g/dL    Hematocrit 40.5 37.0 - 47.0 %    MCV 97.4 81.0 - 99.0 fL    MCH 31.0 27.0 - 31.0 pg    MCHC 31.9 (L) 33.0 - 37.0 g/dL    RDW 14.1 11.5 - 14.5 %    Platelets 821 453 - 833 K/uL    MPV 9.3 (L) 9.4 - 12.3 fL    Neutrophils % 62.9 50.0 - 65.0 % Lymphocytes % 19.3 (L) 20.0 - 40.0 %    Monocytes % 11.4 (H) 0.0 - 10.0 %    Eosinophils % 4.4 0.0 - 5.0 %    Basophils % 1.1 (H) 0.0 - 1.0 %    Neutrophils Absolute 5.1 1.5 - 7.5 K/uL    Immature Granulocytes # 0.1 K/uL    Lymphocytes Absolute 1.6 1.1 - 4.5 K/uL    Monocytes Absolute 0.90 0.00 - 0.90 K/uL    Eosinophils Absolute 0.40 0.00 - 0.60 K/uL    Basophils Absolute 0.10 0.00 - 0.20 K/uL   Ferritin   Result Value Ref Range    Ferritin 58.4 13.0 - 150.0 ng/mL   Iron and TIBC   Result Value Ref Range    Iron 78 37 - 145 ug/dL    TIBC 301 250 - 400 ug/dL    Iron Saturation 26 14 - 50 %   Folate   Result Value Ref Range    Folate >20.0 4.8 - 37.3 ng/mL   Vitamin B12   Result Value Ref Range    Vitamin B-12 959 (H) 211 - 946 pg/mL   TSH without Reflex   Result Value Ref Range    TSH 3.490 0.270 - 4.200 uIU/mL   Lipid, Fasting   Result Value Ref Range    Cholesterol, Fasting 241 (H) 160 - 199 mg/dL    Triglyceride, Fasting 236 (H) 0 - 149 mg/dL    HDL 55 (L) 65 - 121 mg/dL    LDL Calculated 139 <100 mg/dL       ASSESSMENT/ PLAN:  1. Medicare annual wellness visit, subsequent  Chart, medications, labs, vaccines reviewed. Keep up to date with routine care and follow up. Call with any problems or complaints. Keep up to date with routine screening recomendations and vaccines. 2. Hypothyroidism, unspecified type  Stable thyroid     3. Chronic kidney disease, unspecified CKD stage  Reviewed and interpreted labs stable ckd 3 - better than at times    4. Hypertensive heart disease without heart failure  Continue the current medical regimen with Accupril and as needed clonidine  - cloNIDine (CATAPRES) 0.1 MG tablet; Take 1 tablet by mouth daily as needed for High Blood Pressure  Dispense: 90 tablet; Refill: 3    5. 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  Anemia in chronic kidney disease, unspecified CKD stage  Anemia is resolved reviewed and interpreted labs remain off aspirin and NSAIDs      7. Stage 3b chronic kidney disease ([facility] [address]  I reviewed her labs stable in this range for several years we will follow better than at times in the past    8. Mixed hyperlipidemia  Continue with her current medical regimen and diet her labs are stable she is 89 and half doing well    9. PUD (peptic ulcer disease)  This is resolved since she went off NSAIDs and aspirn--- no longer on a PPI --if recurrent nausea abdominal pain will have to reassess    10. Essential hypertension  Patient's blood pressure was good here she says it will be good and then sometimes at home quite high as it was 200/185 that does not sound like an accurate reading her daughters get a look at the memory on her machine and they will let me know her daughter said sometimes the background numbers show up but it makes it harder to read we think the blood pressure is likely okay I did refill as needed clonidine. Medicare Annual Wellness Visit  Name: Audrey Patel Date: 2021   MRN: [id number] Sex: Female   Age: 80 y.o. Ethnicity: Non- / Non    : 3/22/1927 Race: White (non-)      [place] is here for Medicare AWV, Hypertension, and Hypothyroidism    Screenings for behavioral, psychosocial and functional/safety risks, and cognitive dysfunction are all negative except as indicated below. These results, as well as other patient data from the [address] form, are documented in Flowsheets linked to this Encounter. Allergies   Allergen Reactions    Biaxin [Clarithromycin]     Vioxx [Rofecoxib]        Prior to Visit Medications    Medication Sig Taking?  Authorizing Provider   cloNIDine (CATAPRES) 0.1 MG tablet Take 1 tablet by mouth daily as needed for High Blood Pressure Yes Nirmala Cabrera MD   quinapril (ACCUPRIL) 40 MG tablet TAKE 1 TABLET TWO TIMES DAILY  Nirmala Cabrera MD   allopurinol (ZYLOPRIM) 300 MG tablet TAKE 1 TABLET DAILY  Nirmala Cabrera MD   levothyroxine (SYNTHROID) 50 MCG tablet TAKE 1 TABLET DAILY  Lata Brar MD   cycloSPORINE (RESTASIS) 0.05 % ophthalmic emulsion 1 drop 2 times daily  Historical Provider, MD   Multiple Vitamins-Minerals (THERAPEUTIC MULTIVITAMIN-MINERALS) tablet Take 1 tablet by mouth daily  Historical Provider, MD   Acetaminophen (TYLENOL ARTHRITIS PAIN PO) Take by mouth  Historical Provider, MD   timolol (TIMOPTIC) 0.5 % ophthalmic solution 1 drop 2 times daily  Historical Provider, MD   bimatoprost (LUMIGAN) 0.01 % SOLN ophthalmic drops Place 1 drop into both eyes nightly  Historical Provider, MD       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     Chronic kidney disease     Eczema     Fibrocystic breast disease (FCBD)     Fibromyalgia     Hypertensive heart disease     Migraine     Mixed hyperlipidemia     Osteoarthritis     Skin cancer     Dr Jj Whitney, rt ant tibial region, 9/07, 1/16 Dr Magda Latham       Past Surgical History:   Procedure Laterality Date    APPENDECTOMY      COLONOSCOPY      HYSTERECTOMY         Family History   Problem Relation Age of Onset    Other Mother         thrombocytopenia    Other Father         skin cancer       CareTeam (Including outside providers/suppliers regularly involved in providing care):   Patient Care Team:  Lata Brar MD as PCP - General (Internal Medicine)  Lata Brar MD as PCP - [facility] Empaneled Provider  Amy Soni MD as Consulting Physician (Ophthalmology)    Wt Readings from Last 3 Encounters:   08/12/21 117 lb (53.1 kg)   09/13/19 107 lb 9.6 oz (48.8 kg)   07/19/19 109 lb 12.8 oz (49.8 kg)     Vitals:    08/12/21 1131   BP: 118/78   Pulse: 80   SpO2: 95%   Weight: 117 lb (53.1 kg)   Height: 5' (1.524 m)     Body mass index is 22.85 kg/m². Based upon direct observation of the patient, evaluation of cognition reveals no issues   Patient's complete Health Risk Assessment and screening values have been reviewed and are found in Flowsheets.  The following problems were reviewed today and where indicated follow up appointments were made and/or referrals ordered. Positive Risk Factor Screenings with Interventions:          General Health and ACP:  General  In general, how would you say your health is?: Good  In the past 7 days, have you experienced any of the following? New or Increased Pain, New or Increased Fatigue, Loneliness, Social Isolation, Stress or Anger?: None of These  Do you get the social and emotional support that you need?: Yes  Do you have a Living Will?: Yes  Advance Directives     Power of [facility] Will ACP-Advance Directive ACP-Power of     Not on File Not on File Not on File Not on File      General Health Risk Interventions:  · Patient's daughter has moved back to [place] patient has supportive and close family has a living well daughter and family power of     Health Habits/Nutrition:  Health Habits/Nutrition  Do you exercise for at least 20 minutes 2-3 times per week?: (!) No  Have you lost any weight without trying in the past 3 months?: No  Do you eat only one meal per day?: No  Have you seen the dentist within the past year?: Yes  Body mass index: 22.85  Health Habits/Nutrition Interventions:  · She walks her dog 4 times a day in the field next to her house active. Hearing/Vision:  No exam data present  Hearing/Vision  Do you or your family notice any trouble with your hearing that hasn't been managed with hearing aids?: (!) Yes  Do you have difficulty driving, watching TV, or doing any of your daily activities because of your eyesight?: (!) Yes  Have you had an eye exam within the past year?: (!) No  Hearing/Vision Interventions:  · Keep up with eye exam     ADL:  ADLs  In the past 7 days, did you need help from others to perform any of the following everyday activities?  Eating, dressing, grooming, bathing, toileting, or walking/balance?: None  In the past 7 days, did you need help from others to take care of any of the following? Laundry, housekeeping, banking/finances, shopping, telephone use, food preparation, transportation, or taking medications?: (!) Banking/Finances, Shopping, Transportation  ADL Interventions:  · She has help with ADLs including from her daughter who is moved back to [facility]. Very helpful supportive family    Personalized Preventive Plan   Current Health Maintenance Status  Immunization History   Administered Date(s) Administered    COVID-19, [place], PF, 30mcg/0.3mL 02/20/2021, 03/13/2021    Influenza, High Dose (Fluzone 65 yrs and older) 09/23/2018    Influenza, Triv, inactivated, subunit, adjuvanted, IM (Fluad 65 yrs and older) 09/13/2019    Pneumococcal Conjugate 13-valent ([id number]) 12/18/2014    Pneumococcal Polysaccharide ([id number]) 11/15/2004, 05/08/2017    Td, unspecified formulation 11/13/2007    Tdap (Boostrix, Adacel) 12/18/2014    Zoster Live (Zostavax) 09/02/2015        Health Maintenance   Topic Date Due    Shingles Vaccine (2 of 3) 10/28/2015    Annual Wellness Visit (AWV)  Never done    Flu vaccine (1) 09/01/2021    TSH testing  08/02/2022    Potassium monitoring  08/02/2022    Creatinine monitoring  08/02/2022    DTaP/Tdap/Td vaccine (2 - Td or Tdap) 12/18/2024    Pneumococcal 65+ years Vaccine  Completed    COVID-19 Vaccine  Completed    Hepatitis A vaccine  Aged Out    Hepatitis B vaccine  Aged Out    Hib vaccine  Aged Out    Meningococcal (ACWY) vaccine  Aged Out     Recommendations for [employer] Due: see orders and patient instructions/AVS.  . Recommended screening schedule for the next 5-10 years is provided to the patient in written form: see Patient Instructions/AVS.    Christophe Nguyen was seen today for medicare awv, hypertension and hypothyroidism. Diagnoses and all orders for this visit:    Medicare annual wellness visit, subsequent    Hypothyroidism, unspecified type  -     cloNIDine (CATAPRES) 0.1 MG tablet;  Take 1 tablet by mouth daily as needed for High Blood Pressure    Chronic kidney disease, unspecified CKD stage  -     cloNIDine (CATAPRES) 0.1 MG tablet; Take 1 tablet by mouth daily as needed for High Blood Pressure    Hypertensive heart disease without heart failure  -     cloNIDine (CATAPRES) 0.1 MG tablet; Take 1 tablet by mouth daily as needed for High Blood Pressure    Arthritis due to gout  -     cloNIDine (CATAPRES) 0.1 MG tablet; Take 1 tablet by mouth daily as needed for High Blood Pressure    Anemia in chronic kidney disease, unspecified CKD stage  -     cloNIDine (CATAPRES) 0.1 MG tablet;  Take 1 tablet by mouth daily as needed for High Blood Pressure    Stage 3b chronic kidney disease ([facility] [address]    Mixed hyperlipidemia    PUD (peptic ulcer disease)    Essential hypertension

## 2021-08-13 NOTE — PATIENT INSTRUCTIONS
Personalized Preventive Plan for [place] - 8/12/2021  Medicare offers a range of preventive health benefits. Some of the tests and screenings are paid in full while other may be subject to a deductible, co-insurance, and/or copay. Some of these benefits include a comprehensive review of your medical history including lifestyle, illnesses that may run in your family, and various assessments and screenings as appropriate. After reviewing your medical record and screening and assessments performed today your provider may have ordered immunizations, labs, imaging, and/or referrals for you. A list of these orders (if applicable) as well as your Preventive Care list are included within your After Visit Summary for your review. Other Preventive Recommendations:    · A preventive eye exam performed by an eye specialist is recommended every 1-2 years to screen for glaucoma; cataracts, macular degeneration, and other eye disorders. · A preventive dental visit is recommended every 6 months. · Try to get at least 150 minutes of exercise per week or 10,000 steps per day on a pedometer . · Order or download the FREE \"Exercise & Physical Activity: Your Everyday Guide\" from [employer]. Call 3-528.811.7365 or search [employer] online. · You need 3534-0468 mg of calcium and 1821-6180 IU of vitamin D per day. It is possible to meet your calcium requirement with diet alone, but a vitamin D supplement is usually necessary to meet this goal.  · When exposed to the sun, use a sunscreen that protects against both UVA and UVB radiation with an SPF of 30 or greater. Reapply every 2 to 3 hours or after sweating, drying off with a towel, or swimming. · Always wear a seat belt when traveling in a car. Always wear a helmet when riding a bicycle or motorcycle.

## 2021-08-26 ENCOUNTER — PROCEDURE (OUTPATIENT)
Dept: URBAN - METROPOLITAN AREA CLINIC 31 | Facility: CLINIC | Age: 86
End: 2021-08-26
Payer: MEDICARE

## 2021-08-26 PROCEDURE — 92134 CPTRZ OPH DX IMG PST SGM RTA: CPT | Performed by: OPHTHALMOLOGY

## 2021-08-26 PROCEDURE — 67028 INJECTION EYE DRUG: CPT | Performed by: OPHTHALMOLOGY

## 2021-08-26 ASSESSMENT — INTRAOCULAR PRESSURE
OD: 12
OS: 10

## 2021-09-23 ENCOUNTER — OFFICE VISIT (OUTPATIENT)
Dept: URBAN - METROPOLITAN AREA CLINIC 31 | Facility: CLINIC | Age: 86
End: 2021-09-23
Payer: MEDICARE

## 2021-09-23 PROCEDURE — 67028 INJECTION EYE DRUG: CPT | Performed by: OPHTHALMOLOGY

## 2021-09-23 PROCEDURE — 92134 CPTRZ OPH DX IMG PST SGM RTA: CPT | Performed by: OPHTHALMOLOGY

## 2021-09-23 PROCEDURE — 92012 INTRM OPH EXAM EST PATIENT: CPT | Performed by: OPHTHALMOLOGY

## 2021-09-23 ASSESSMENT — INTRAOCULAR PRESSURE
OS: 10
OD: 14

## 2021-09-23 NOTE — IMPRESSION/PLAN
Impression: Nexdtve age-related mclr degn, left eye, intermed dry stage: H35.3122. OS. Status: Chronic. Plan: Patient notes mild distortion in vision. Exam and OCT demonstrate stable drusen and RPE changes confirming AMD is still dry. The patient was advised to continue AREDS 2 vitamin supplements; the risk of smoking was emphasized with the patient. The patient was also advised to continue to use an [address] to monitor the vision and to call immediately with any changes. Will closely monitor.

## 2021-09-23 NOTE — IMPRESSION/PLAN
Impression: Exdtve age-rel mclr degn, right eye, with actv chrdl neovas: H35.3211 OD. Status: Symptomatic.
-s/p Avastin last 08/31/17
-s/p Eylea last 08/26/2021
-s/p Beovu last 12/31/2020
worse at ~5 wks Plan: Exam and OCT reveal PED with atrophy; there has been no improvement in vision after trial of Beovu. Will taper visits back to 4 weeks and continue with Eylea injections. R/B/A's discussed. Patient elects to proceed. Intravitreal Eylea injection performed into the right eye without complication.  

RTC: 4 weeks OCT OU; STRAIGHT EYLEA OD #1/3

## 2021-10-21 ENCOUNTER — PROCEDURE (OUTPATIENT)
Dept: URBAN - METROPOLITAN AREA CLINIC 31 | Facility: CLINIC | Age: 86
End: 2021-10-21
Payer: MEDICARE

## 2021-10-21 PROCEDURE — 67028 INJECTION EYE DRUG: CPT | Performed by: OPHTHALMOLOGY

## 2021-10-21 PROCEDURE — 92134 CPTRZ OPH DX IMG PST SGM RTA: CPT | Performed by: OPHTHALMOLOGY

## 2021-10-21 ASSESSMENT — INTRAOCULAR PRESSURE
OS: 11
OD: 14

## 2021-11-18 ENCOUNTER — PROCEDURE (OUTPATIENT)
Dept: URBAN - METROPOLITAN AREA CLINIC 31 | Facility: CLINIC | Age: 86
End: 2021-11-18
Payer: MEDICARE

## 2021-11-18 PROCEDURE — 67028 INJECTION EYE DRUG: CPT | Performed by: OPHTHALMOLOGY

## 2021-11-18 PROCEDURE — 92134 CPTRZ OPH DX IMG PST SGM RTA: CPT | Performed by: OPHTHALMOLOGY

## 2021-11-18 ASSESSMENT — INTRAOCULAR PRESSURE
OD: 18
OS: 14

## 2021-12-16 ENCOUNTER — PROCEDURE (OUTPATIENT)
Dept: URBAN - METROPOLITAN AREA CLINIC 31 | Facility: CLINIC | Age: 86
End: 2021-12-16
Payer: MEDICARE

## 2021-12-16 PROCEDURE — 67028 INJECTION EYE DRUG: CPT | Performed by: OPHTHALMOLOGY

## 2021-12-16 PROCEDURE — 92134 CPTRZ OPH DX IMG PST SGM RTA: CPT | Performed by: OPHTHALMOLOGY

## 2021-12-16 ASSESSMENT — INTRAOCULAR PRESSURE
OD: 16
OS: 14

## 2022-01-13 ENCOUNTER — OFFICE VISIT (OUTPATIENT)
Dept: URBAN - METROPOLITAN AREA CLINIC 31 | Facility: CLINIC | Age: 87
End: 2022-01-13
Payer: MEDICARE

## 2022-01-13 DIAGNOSIS — H35.372 PUCKERING OF MACULA, LEFT EYE: ICD-10-CM

## 2022-01-13 PROCEDURE — 67028 INJECTION EYE DRUG: CPT | Performed by: OPHTHALMOLOGY

## 2022-01-13 PROCEDURE — 92134 CPTRZ OPH DX IMG PST SGM RTA: CPT | Performed by: OPHTHALMOLOGY

## 2022-01-13 PROCEDURE — 92012 INTRM OPH EXAM EST PATIENT: CPT | Performed by: OPHTHALMOLOGY

## 2022-01-13 ASSESSMENT — INTRAOCULAR PRESSURE
OS: 13
OD: 17

## 2022-01-13 NOTE — IMPRESSION/PLAN
Impression: Exdtve age-rel mclr degn, right eye, with actv chrdl neovas: H35.3211 OD. Status: Symptomatic.
-s/p Avastin last 08/31/17
-s/p Eylea last 12/16/2021
-s/p Beovu last 12/31/2020
worse at ~5 wks Plan: Exam and OCT reveal PED with atrophy. Will continue with Eylea injections at 4-5 wk tx interval.  R/B/A's discussed. Patient elects to proceed. Intravitreal Eylea injection performed into the right eye without complication.  

RTC: 4 weeks OCT OU; STRAIGHT EYLEA OD #1/3 Eyeglasses

## 2022-01-13 NOTE — IMPRESSION/PLAN
Impression: Nexdtve age-related mclr degn, left eye, intermed dry stage: H35.3122. OS. Status: Chronic. Plan: Patient notes mild distortion in vision. Exam and OCT demonstrate stable drusen and RPE changes confirming AMD is still dry. The patient was advised to continue AREDS 2 vitamin supplements; the risk of smoking was emphasized with the patient. The patient was also advised to continue to use an [address] to monitor the vision and to call immediately with any changes. Will closely monitor.

## 2022-02-10 ENCOUNTER — PROCEDURE (OUTPATIENT)
Dept: URBAN - METROPOLITAN AREA CLINIC 31 | Facility: CLINIC | Age: 87
End: 2022-02-10
Payer: MEDICARE

## 2022-02-10 PROCEDURE — 67028 INJECTION EYE DRUG: CPT | Performed by: OPHTHALMOLOGY

## 2022-02-10 PROCEDURE — 92134 CPTRZ OPH DX IMG PST SGM RTA: CPT | Performed by: OPHTHALMOLOGY

## 2022-02-10 ASSESSMENT — INTRAOCULAR PRESSURE
OD: 16
OS: 15

## 2022-03-10 ENCOUNTER — PROCEDURE (OUTPATIENT)
Dept: URBAN - METROPOLITAN AREA CLINIC 31 | Facility: CLINIC | Age: 87
End: 2022-03-10
Payer: MEDICARE

## 2022-03-10 PROCEDURE — 67028 INJECTION EYE DRUG: CPT | Performed by: OPHTHALMOLOGY

## 2022-03-10 PROCEDURE — 92134 CPTRZ OPH DX IMG PST SGM RTA: CPT | Performed by: OPHTHALMOLOGY

## 2022-03-10 ASSESSMENT — INTRAOCULAR PRESSURE
OD: 16
OS: 12

## 2022-04-07 ENCOUNTER — OFFICE VISIT (OUTPATIENT)
Dept: URBAN - METROPOLITAN AREA CLINIC 31 | Facility: CLINIC | Age: 87
End: 2022-04-07
Payer: MEDICARE

## 2022-04-07 PROCEDURE — 67028 INJECTION EYE DRUG: CPT | Performed by: OPHTHALMOLOGY

## 2022-04-07 PROCEDURE — 92134 CPTRZ OPH DX IMG PST SGM RTA: CPT | Performed by: OPHTHALMOLOGY

## 2022-04-07 ASSESSMENT — INTRAOCULAR PRESSURE
OS: 14
OD: 18

## 2022-04-19 ENCOUNTER — OFFICE VISIT (OUTPATIENT)
Dept: INTERNAL MEDICINE | Age: 87
End: 2022-04-19
Payer: MEDICARE

## 2022-04-19 VITALS
HEIGHT: 60 IN | DIASTOLIC BLOOD PRESSURE: 85 MMHG | BODY MASS INDEX: 22.78 KG/M2 | OXYGEN SATURATION: 95 % | WEIGHT: 116 LBS | SYSTOLIC BLOOD PRESSURE: 160 MMHG | HEART RATE: 76 BPM

## 2022-04-19 DIAGNOSIS — N18.9 CHRONIC KIDNEY DISEASE, UNSPECIFIED CKD STAGE: ICD-10-CM

## 2022-04-19 DIAGNOSIS — G44.52 NEW DAILY PERSISTENT HEADACHE: ICD-10-CM

## 2022-04-19 DIAGNOSIS — M10.9 ARTHRITIS DUE TO GOUT: ICD-10-CM

## 2022-04-19 DIAGNOSIS — I11.9 HYPERTENSIVE HEART DISEASE WITHOUT HEART FAILURE: ICD-10-CM

## 2022-04-19 DIAGNOSIS — G44.52 NEW DAILY PERSISTENT HEADACHE: Primary | ICD-10-CM

## 2022-04-19 DIAGNOSIS — N18.9 ANEMIA IN CHRONIC KIDNEY DISEASE, UNSPECIFIED CKD STAGE: ICD-10-CM

## 2022-04-19 DIAGNOSIS — E03.9 HYPOTHYROIDISM, UNSPECIFIED TYPE: ICD-10-CM

## 2022-04-19 DIAGNOSIS — N18.32 STAGE 3B CHRONIC KIDNEY DISEASE (HCC): ICD-10-CM

## 2022-04-19 DIAGNOSIS — D63.1 ANEMIA IN CHRONIC KIDNEY DISEASE, UNSPECIFIED CKD STAGE: ICD-10-CM

## 2022-04-19 LAB
ALBUMIN SERPL-MCNC: 4.6 G/DL (ref 3.5–5.2)
ALP BLD-CCNC: 69 U/L (ref 35–104)
ALT SERPL-CCNC: 13 U/L (ref 5–33)
ANION GAP SERPL CALCULATED.3IONS-SCNC: 12 MMOL/L (ref 7–19)
AST SERPL-CCNC: 21 U/L (ref 5–32)
BILIRUB SERPL-MCNC: <0.2 MG/DL (ref 0.2–1.2)
BUN BLDV-MCNC: 35 MG/DL (ref 8–23)
C-REACTIVE PROTEIN: <0.3 MG/DL (ref 0–0.5)
CALCIUM SERPL-MCNC: 10.2 MG/DL (ref 8.2–9.6)
CHLORIDE BLD-SCNC: 102 MMOL/L (ref 98–111)
CO2: 26 MMOL/L (ref 22–29)
CREAT SERPL-MCNC: 1.5 MG/DL (ref 0.5–0.9)
GFR AFRICAN AMERICAN: 39
GFR NON-AFRICAN AMERICAN: 32
GLUCOSE BLD-MCNC: 107 MG/DL (ref 74–109)
HCT VFR BLD CALC: 40.1 % (ref 37–47)
HEMOGLOBIN: 12.5 G/DL (ref 12–16)
MCH RBC QN AUTO: 31.3 PG (ref 27–31)
MCHC RBC AUTO-ENTMCNC: 31.2 G/DL (ref 33–37)
MCV RBC AUTO: 100.3 FL (ref 81–99)
PDW BLD-RTO: 13.8 % (ref 11.5–14.5)
PLATELET # BLD: 279 K/UL (ref 130–400)
PMV BLD AUTO: 9.8 FL (ref 9.4–12.3)
POTASSIUM SERPL-SCNC: 4.7 MMOL/L (ref 3.5–5)
RBC # BLD: 4 M/UL (ref 4.2–5.4)
SEDIMENTATION RATE, ERYTHROCYTE: 30 MM/HR (ref 0–25)
SODIUM BLD-SCNC: 140 MMOL/L (ref 136–145)
TOTAL PROTEIN: 7.1 G/DL (ref 6.6–8.7)
TSH SERPL DL<=0.05 MIU/L-ACNC: 1.85 UIU/ML (ref 0.27–4.2)
WBC # BLD: 7.5 K/UL (ref 4.8–10.8)

## 2022-04-19 PROCEDURE — G8427 DOCREV CUR MEDS BY ELIG CLIN: HCPCS | Performed by: INTERNAL MEDICINE

## 2022-04-19 PROCEDURE — 1090F PRES/ABSN URINE INCON ASSESS: CPT | Performed by: INTERNAL MEDICINE

## 2022-04-19 PROCEDURE — 4040F PNEUMOC VAC/ADMIN/RCVD: CPT | Performed by: INTERNAL MEDICINE

## 2022-04-19 PROCEDURE — G8420 CALC BMI NORM PARAMETERS: HCPCS | Performed by: INTERNAL MEDICINE

## 2022-04-19 PROCEDURE — 99214 OFFICE O/P EST MOD 30 MIN: CPT | Performed by: INTERNAL MEDICINE

## 2022-04-19 PROCEDURE — 1036F TOBACCO NON-USER: CPT | Performed by: INTERNAL MEDICINE

## 2022-04-19 PROCEDURE — 1123F ACP DISCUSS/DSCN MKR DOCD: CPT | Performed by: INTERNAL MEDICINE

## 2022-04-19 RX ORDER — QUINAPRIL 40 MG/1
TABLET ORAL
Qty: 180 TABLET | Refills: 3 | Status: CANCELLED | OUTPATIENT
Start: 2022-04-19

## 2022-04-19 RX ORDER — BRIMONIDINE TARTRATE, TIMOLOL MALEATE 2; 5 MG/ML; MG/ML
SOLUTION/ DROPS OPHTHALMIC DAILY
COMMUNITY
Start: 2022-03-28

## 2022-04-19 RX ORDER — ALLOPURINOL 300 MG/1
TABLET ORAL
Qty: 90 TABLET | Refills: 3 | Status: CANCELLED | OUTPATIENT
Start: 2022-04-19

## 2022-04-19 RX ORDER — LEVOTHYROXINE SODIUM 0.05 MG/1
TABLET ORAL
Qty: 90 TABLET | Refills: 3 | Status: CANCELLED | OUTPATIENT
Start: 2022-04-19

## 2022-04-19 ASSESSMENT — PATIENT HEALTH QUESTIONNAIRE - PHQ9
SUM OF ALL RESPONSES TO PHQ QUESTIONS 1-9: 0
SUM OF ALL RESPONSES TO PHQ9 QUESTIONS 1 & 2: 0
SUM OF ALL RESPONSES TO PHQ QUESTIONS 1-9: 0
1. LITTLE INTEREST OR PLEASURE IN DOING THINGS: 0
2. FEELING DOWN, DEPRESSED OR HOPELESS: 0

## 2022-04-19 NOTE — PROGRESS NOTES
Chief Complaint   Patient presents with    6 Month Follow-Up    Anemia    Hypothyroidism       HPI: Patient is here today to follow-up anemia hypothyroidism hypertension. She is doing well she denies having any specific new problem she is here with her daughter she has some issues with arthritis but she lives alone she gets along well she is 80years old. She has been having some headache that is new for her    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     Chronic kidney disease     Eczema     Fibrocystic breast disease (FCBD)     Fibromyalgia     Hypertensive heart disease     Migraine     Mixed hyperlipidemia     Osteoarthritis     Skin cancer      [facility], rt ant tibial region, 9/07, 1/16 Dr Jackelyn Sky       Past Surgical History:   Procedure Laterality Date    APPENDECTOMY      COLONOSCOPY      HYSTERECTOMY         Family History   Problem Relation Age of Onset    Other Mother         thrombocytopenia    Other Father         skin cancer       Social History     Socioeconomic History    Marital status:      Spouse name: Not on file    Number of children: Not on file    Years of education: Not on file    Highest education level: Not on file   Occupational History    Not on file   Tobacco Use    Smoking status: Former Smoker    Smokeless tobacco: Never Used   Substance and Sexual Activity    Alcohol use: Not on file    Drug use: Not on file    Sexual activity: Not on file   Other Topics Concern    Not on file   Social History Narrative    Not on file     Social Determinants of Health     Financial Resource Strain: Medium Risk    Difficulty of Paying Living Expenses: Somewhat hard   Food Insecurity: No Food Insecurity    Worried About Running Out of Food in the Last Year: Never true    Florencio of Food in the Last Year: Never true   Transportation Needs:     Lack of Transportation (Medical):  Not on file    Lack of Transportation (Non-Medical): Not on file   Physical Activity:     Days of Exercise per Week: Not on file    Minutes of Exercise per Session: Not on file   Stress:     Feeling of Stress : Not on file   Social Connections:     Frequency of Communication with Friends and Family: Not on file    Frequency of Social Gatherings with Friends and Family: Not on file    Attends Jew Services: Not on file    Active Member of [address] [place] [address] or Organizations: Not on file    Attends Club or Organization Meetings: Not on file    Marital Status: Not on file   Intimate Partner Violence:     Fear of Current or Ex-Partner: Not on file    Emotionally Abused: Not on file    Physically Abused: Not on file    Sexually Abused: Not on file   Housing Stability:     Unable to Pay for Housing in the Last Year: Not on file    Number of [place] in the Last Year: Not on file    Unstable Housing in the Last Year: Not on file       Allergies   Allergen Reactions    Biaxin [Clarithromycin]     Vioxx [Rofecoxib]        Current Outpatient Medications   Medication Sig Dispense Refill    brimonidine-timolol (COMBIGAN) 0.2-0.5 % ophthalmic solution Daily      cloNIDine (CATAPRES) 0.1 MG tablet Take 1 tablet by mouth daily as needed for High Blood Pressure 90 tablet 3    quinapril (ACCUPRIL) 40 MG tablet TAKE 1 TABLET TWO TIMES DAILY 180 tablet 3    allopurinol (ZYLOPRIM) 300 MG tablet TAKE 1 TABLET DAILY 90 tablet 3    levothyroxine (SYNTHROID) 50 MCG tablet TAKE 1 TABLET DAILY 90 tablet 3    cycloSPORINE (RESTASIS) 0.05 % ophthalmic emulsion 1 drop 2 times daily      Multiple Vitamins-Minerals (THERAPEUTIC MULTIVITAMIN-MINERALS) tablet Take 1 tablet by mouth daily      Acetaminophen (TYLENOL ARTHRITIS PAIN PO) Take by mouth       No current facility-administered medications for this visit.        Review of Systems    BP (!) 160/85   Pulse 76   Ht 5' (1.524 m)   Wt 116 lb (52.6 kg)   SpO2 95%   BMI 22.65 kg/m²   BP Readings from Last 7 Encounters:   04/19/22 (!) 160/85   08/12/21 118/78   09/13/19 (!) 142/80   07/19/19 (!) 170/80   06/27/19 (!) 90/48   06/05/19 98/64   05/20/19 100/70     Wt Readings from Last 7 Encounters:   04/19/22 116 lb (52.6 kg)   08/12/21 117 lb (53.1 kg)   09/13/19 107 lb 9.6 oz (48.8 kg)   07/19/19 109 lb 12.8 oz (49.8 kg)   06/27/19 107 lb (48.5 kg)   06/05/19 108 lb (49 kg)   05/20/19 109 lb (49.4 kg)     BMI Readings from Last 7 Encounters:   04/19/22 22.65 kg/m²   08/12/21 22.85 kg/m²   09/13/19 21.01 kg/m²   07/19/19 21.44 kg/m²   06/27/19 20.90 kg/m²   06/05/19 21.09 kg/m²   05/20/19 21.29 kg/m²     Resp Readings from Last 7 Encounters:   06/05/19 20       Physical Exam  Constitutional:       General: She is not in acute distress. Eyes:      General: No scleral icterus. Cardiovascular:      Heart sounds: Normal heart sounds. Pulmonary:      Breath sounds: Normal breath sounds. Musculoskeletal:      Cervical back: Neck supple. Lymphadenopathy:      Cervical: No cervical adenopathy. Skin:     Findings: No rash.       Comments: Chronic thinning hair         Results for orders placed or performed in visit on 08/02/21   Comprehensive Metabolic Panel   Result Value Ref Range    Sodium 137 136 - 145 mmol/L    Potassium 4.7 3.5 - 5.0 mmol/L    Chloride 96 (L) 98 - 111 mmol/L    CO2 29 22 - 29 mmol/L    Anion Gap 12 7 - 19 mmol/L    Glucose 97 74 - 109 mg/dL    BUN 26 (H) 8 - 23 mg/dL    CREATININE 1.5 (H) 0.5 - 0.9 mg/dL    GFR Non- 32 (A) >60    GFR  39 (L) >59    Calcium 10.5 (H) 8.2 - 9.6 mg/dL    Total Protein 7.6 6.6 - 8.7 g/dL    Albumin 4.4 3.5 - 5.2 g/dL    Total Bilirubin 0.3 0.2 - 1.2 mg/dL    Alkaline Phosphatase 75 35 - 104 U/L    ALT 11 5 - 33 U/L    AST 20 5 - 32 U/L   CBC Auto Differential   Result Value Ref Range    WBC 8.1 4.8 - 10.8 K/uL    RBC 4.16 (L) 4.20 - 5.40 M/uL    Hemoglobin 12.9 12.0 - 16.0 g/dL    Hematocrit 40.5 37.0 - 47.0 %    MCV 97.4 81.0 - 99.0 fL MCH 31.0 27.0 - 31.0 pg    MCHC 31.9 (L) 33.0 - 37.0 g/dL    RDW 14.1 11.5 - 14.5 %    Platelets 785 210 - 778 K/uL    MPV 9.3 (L) 9.4 - 12.3 fL    Neutrophils % 62.9 50.0 - 65.0 %    Lymphocytes % 19.3 (L) 20.0 - 40.0 %    Monocytes % 11.4 (H) 0.0 - 10.0 %    Eosinophils % 4.4 0.0 - 5.0 %    Basophils % 1.1 (H) 0.0 - 1.0 %    Neutrophils Absolute 5.1 1.5 - 7.5 K/uL    Immature Granulocytes # 0.1 K/uL    Lymphocytes Absolute 1.6 1.1 - 4.5 K/uL    Monocytes Absolute 0.90 0.00 - 0.90 K/uL    Eosinophils Absolute 0.40 0.00 - 0.60 K/uL    Basophils Absolute 0.10 0.00 - 0.20 K/uL   Ferritin   Result Value Ref Range    Ferritin 58.4 13.0 - 150.0 ng/mL   Iron and TIBC   Result Value Ref Range    Iron 78 37 - 145 ug/dL    TIBC 301 250 - 400 ug/dL    Iron Saturation 26 14 - 50 %   Folate   Result Value Ref Range    Folate >20.0 4.8 - 37.3 ng/mL   Vitamin B12   Result Value Ref Range    Vitamin B-12 959 (H) 211 - 946 pg/mL   TSH without Reflex   Result Value Ref Range    TSH 3.490 0.270 - 4.200 uIU/mL   Lipid, Fasting   Result Value Ref Range    Cholesterol, Fasting 241 (H) 160 - 199 mg/dL    Triglyceride, Fasting 236 (H) 0 - 149 mg/dL    HDL 55 (L) 65 - 121 mg/dL    LDL Calculated 139 <100 mg/dL       ASSESSMENT/ PLAN:  1. Hypothyroidism, unspecified type  We need to watch her thyroid continue with current meds lab will be due again next visit  - TSH; Future    2. Chronic kidney disease, unspecified CKD stage  Stable chronic kidney disease continue with his present medical regimen    3. Hypertensive heart disease without heart failure  Continue with blood pressure control blood pressure is high today apparently good control at home she checks this routinely    4. 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 Anemia in chronic kidney disease, unspecified CKD stage  Anemia in past due to NSAIDs avoid NSAIDs is much as possible    6.  New daily persistent headache  Kaela check sed rate CRP CBC CMP headache more posterior and superior but still check sed rate CRP. When her labs bumped back we will interpret them we would also recommend that she use Tylenol rather than NSAIDs for the headache we are going to see if the sed rate CRP are okay watch in terms of allergies and sinus pressure. Her blood pressure here was high but she says its been good they are going to monitor and let me know. Of course this could be the cause of headaches  - CBC; Future  - Comprehensive Metabolic Panel; Future  - Sedimentation Rate; Future  - C-Reactive Protein; Future    7. Stage 3b chronic kidney disease ([facility] [address]  Avoid NSAIDs check lab today and we will plan on seeing her next year if the labs are stable    Chart, medications, labs, vaccines reviewed. Keep up to date with routine care and follow up. Call with any problems or complaints. Keep up to date with routine screening recomendations and vaccines. Patient's daughter is with her today she has good social family support if they feel she needs anything they know to let us know.   Her blood pressure is a little higher that may be the cause of headaches or can monitor this more closely

## 2022-05-12 ENCOUNTER — OFFICE VISIT (OUTPATIENT)
Dept: URBAN - METROPOLITAN AREA CLINIC 31 | Facility: CLINIC | Age: 87
End: 2022-05-12
Payer: MEDICARE

## 2022-05-12 DIAGNOSIS — H35.3122 NEXDTVE AGE-RELATED MCLR DEGN, LEFT EYE, INTERMED DRY STAGE: ICD-10-CM

## 2022-05-12 DIAGNOSIS — Z96.1 PRESENCE OF PSEUDOPHAKIA: Primary | ICD-10-CM

## 2022-05-12 DIAGNOSIS — H35.372 PUCKERING OF MACULA, LEFT EYE: ICD-10-CM

## 2022-05-12 DIAGNOSIS — H35.3211 EXDTVE AGE-REL MCLR DEGN, RIGHT EYE, WITH ACTV CHRDL NEOVAS: ICD-10-CM

## 2022-05-12 PROCEDURE — 67028 INJECTION EYE DRUG: CPT | Performed by: OPHTHALMOLOGY

## 2022-05-12 PROCEDURE — 92014 COMPRE OPH EXAM EST PT 1/>: CPT | Performed by: OPHTHALMOLOGY

## 2022-05-12 PROCEDURE — 92134 CPTRZ OPH DX IMG PST SGM RTA: CPT | Performed by: OPHTHALMOLOGY

## 2022-05-12 ASSESSMENT — INTRAOCULAR PRESSURE
OD: 18
OS: 13

## 2022-05-12 NOTE — IMPRESSION/PLAN
Impression: Presence of pseudophakia: Z96. 1.OU Plan: Patient is using new MRx. Discussed that fit may need to be adjusted so that she can use the correct part of the glasses for distance vision.   She has upcoming appt with Dr. Sloan Never

## 2022-05-12 NOTE — IMPRESSION/PLAN
Impression: Nexdtve age-related mclr degn, left eye, intermed dry stage: H35.3122. OS. Status: Chronic. Plan: Patient notes mild distortion in vision. Exam and OCT demonstrate stable drusen and RPE changes confirming AMD is still dry. The patient was advised to continue AREDS 2 vitamin supplements; the risk of smoking was emphasized with the patient. The patient was also advised to continue to use an [address] to monitor the vision and to call immediately with any changes. Will closely monitor.

## 2022-05-12 NOTE — IMPRESSION/PLAN
Impression: Exdtve age-rel mclr degn, right eye, with actv chrdl neovas: H35.3211 OD. Status: Symptomatic. --worse at ~5 wks
-s/p Avastin last 08/31/17
-s/p Eylea last 04/07/2022
-s/p Beovu last 12/31/2020 Plan: Exam and OCT reveal PED with atrophy. Recommend Eylea injection today and potential switch to Vabysmo at next visit. R/B/A's discussed. Patient elects to proceed. Intravitreal Eylea injection performed into the right eye without complication.  

RTC: 4-5 weeks OCT OU; re-eval Vabysmo

## 2022-06-16 ENCOUNTER — OFFICE VISIT (OUTPATIENT)
Dept: URBAN - METROPOLITAN AREA CLINIC 31 | Facility: CLINIC | Age: 87
End: 2022-06-16
Payer: MEDICARE

## 2022-06-16 DIAGNOSIS — Z96.1 PRESENCE OF PSEUDOPHAKIA: Primary | ICD-10-CM

## 2022-06-16 DIAGNOSIS — H35.372 PUCKERING OF MACULA, LEFT EYE: ICD-10-CM

## 2022-06-16 DIAGNOSIS — H35.3122 NEXDTVE AGE-RELATED MCLR DEGN, LEFT EYE, INTERMED DRY STAGE: ICD-10-CM

## 2022-06-16 DIAGNOSIS — H35.3211 EXDTVE AGE-REL MCLR DEGN, RIGHT EYE, WITH ACTV CHRDL NEOVAS: ICD-10-CM

## 2022-06-16 DIAGNOSIS — S05.11XA CONTUSION OF EYEBALL AND ORBITAL TISSUES, RIGHT EYE, INIT: ICD-10-CM

## 2022-06-16 PROCEDURE — 92134 CPTRZ OPH DX IMG PST SGM RTA: CPT | Performed by: OPHTHALMOLOGY

## 2022-06-16 PROCEDURE — 67028 INJECTION EYE DRUG: CPT | Performed by: OPHTHALMOLOGY

## 2022-06-16 PROCEDURE — 92014 COMPRE OPH EXAM EST PT 1/>: CPT | Performed by: OPHTHALMOLOGY

## 2022-06-16 ASSESSMENT — INTRAOCULAR PRESSURE
OD: 19
OS: 13

## 2022-06-16 NOTE — IMPRESSION/PLAN
Impression: Nexdtve age-related mclr degn, left eye, intermed dry stage: H35.3122. OS. Status: Chronic. Plan: Patient notes mild distortion in vision. Exam and OCT demonstrate stable drusen and RPE changes confirming AMD is still dry. The patient was advised to continue AREDS 2 vitamin supplements; the risk of smoking was emphasized with the patient. The patient was also advised to continue to use an [address] to monitor the vision and to call immediately with any changes. Will closely monitor.

## 2022-06-16 NOTE — IMPRESSION/PLAN
Impression: Exdtve age-rel mclr degn, right eye, with actv chrdl neovas: H35.3211 OD. Status: Symptomatic. --worse at ~5 wks
-s/p Avastin last 08/31/17
-s/p Eylea last 05/12/2022
-s/p Beovu last 12/31/2020 Plan: Exam and OCT reveal PED with atrophy. Recommend switch to Vabysmo today and extend next visit to 6 wks. R/B/A's discussed. Patient elects to proceed. Intravitreal Vabysmo injection performed into the right eye without complication.  

RTC: 6 weeks OCT OU; re-eval Vabysmo

## 2022-06-16 NOTE — IMPRESSION/PLAN
Impression: Contusion of eyeball and orbital tissues, right eye, init: S05.11xA. Plan: Patient recently fell and hit her head. Fortunately, no ocular complications. Warning symptoms discussed.

## 2022-07-08 DIAGNOSIS — I11.9 HYPERTENSIVE HEART DISEASE WITHOUT HEART FAILURE: ICD-10-CM

## 2022-07-08 DIAGNOSIS — E03.9 HYPOTHYROIDISM, UNSPECIFIED TYPE: ICD-10-CM

## 2022-07-08 DIAGNOSIS — N18.9 CHRONIC KIDNEY DISEASE, UNSPECIFIED CKD STAGE: ICD-10-CM

## 2022-07-08 DIAGNOSIS — N18.9 ANEMIA IN CHRONIC KIDNEY DISEASE, UNSPECIFIED CKD STAGE: ICD-10-CM

## 2022-07-08 DIAGNOSIS — D63.1 ANEMIA IN CHRONIC KIDNEY DISEASE, UNSPECIFIED CKD STAGE: ICD-10-CM

## 2022-07-08 DIAGNOSIS — M10.9 ARTHRITIS DUE TO GOUT: ICD-10-CM

## 2022-07-08 RX ORDER — LEVOTHYROXINE SODIUM 0.05 MG/1
TABLET ORAL
Qty: 90 TABLET | Refills: 3 | Status: SHIPPED | OUTPATIENT
Start: 2022-07-08

## 2022-07-08 RX ORDER — ALLOPURINOL 300 MG/1
TABLET ORAL
Qty: 90 TABLET | Refills: 3 | Status: SHIPPED | OUTPATIENT
Start: 2022-07-08 | End: 2022-11-04 | Stop reason: SDUPTHER

## 2022-07-11 DIAGNOSIS — N18.9 ANEMIA IN CHRONIC KIDNEY DISEASE, UNSPECIFIED CKD STAGE: ICD-10-CM

## 2022-07-11 DIAGNOSIS — E03.9 HYPOTHYROIDISM, UNSPECIFIED TYPE: ICD-10-CM

## 2022-07-11 DIAGNOSIS — M10.9 ARTHRITIS DUE TO GOUT: ICD-10-CM

## 2022-07-11 DIAGNOSIS — D63.1 ANEMIA IN CHRONIC KIDNEY DISEASE, UNSPECIFIED CKD STAGE: ICD-10-CM

## 2022-07-11 DIAGNOSIS — N18.9 CHRONIC KIDNEY DISEASE, UNSPECIFIED CKD STAGE: ICD-10-CM

## 2022-07-11 DIAGNOSIS — I11.9 HYPERTENSIVE HEART DISEASE WITHOUT HEART FAILURE: ICD-10-CM

## 2022-07-12 RX ORDER — CLONIDINE HYDROCHLORIDE 0.1 MG/1
TABLET ORAL
Qty: 90 TABLET | Refills: 3 | Status: SHIPPED | OUTPATIENT
Start: 2022-07-12

## 2022-07-28 ENCOUNTER — OFFICE VISIT (OUTPATIENT)
Dept: URBAN - METROPOLITAN AREA CLINIC 31 | Facility: CLINIC | Age: 87
End: 2022-07-28
Payer: MEDICARE

## 2022-07-28 DIAGNOSIS — H35.3211 EXDTVE AGE-REL MCLR DEGN, RIGHT EYE, WITH ACTV CHRDL NEOVAS: ICD-10-CM

## 2022-07-28 DIAGNOSIS — H35.372 PUCKERING OF MACULA, LEFT EYE: ICD-10-CM

## 2022-07-28 DIAGNOSIS — Z96.1 PRESENCE OF PSEUDOPHAKIA: Primary | ICD-10-CM

## 2022-07-28 DIAGNOSIS — H35.3122 NEXDTVE AGE-RELATED MCLR DEGN, LEFT EYE, INTERMED DRY STAGE: ICD-10-CM

## 2022-07-28 DIAGNOSIS — S05.11XA CONTUSION OF EYEBALL AND ORBITAL TISSUES, RIGHT EYE, INIT: ICD-10-CM

## 2022-07-28 PROCEDURE — 92012 INTRM OPH EXAM EST PATIENT: CPT | Performed by: OPHTHALMOLOGY

## 2022-07-28 PROCEDURE — 92134 CPTRZ OPH DX IMG PST SGM RTA: CPT | Performed by: OPHTHALMOLOGY

## 2022-07-28 PROCEDURE — 67028 INJECTION EYE DRUG: CPT | Performed by: OPHTHALMOLOGY

## 2022-07-28 ASSESSMENT — INTRAOCULAR PRESSURE
OD: 15
OS: 18

## 2022-07-28 NOTE — IMPRESSION/PLAN
Impression: Presence of pseudophakia: Z96. 1.OU Plan: Stable. Recent appt with Dr. Jm Haro was stable.

## 2022-07-28 NOTE — IMPRESSION/PLAN
Impression: Exdtve age-rel mclr degn, right eye, with actv chrdl neovas: H35.3211 OD. Status: Symptomatic. --worse at ~5 wks
-s/p Avastin last 08/31/17
-s/p Eylea last 05/12/2022
-s/p Beovu last 12/31/2020
-s/p Vabysmo last 06/16/2022 Plan: Exam and OCT reveal PED with atrophy. Recommend switch to Vabysmo today and extend next visit to 6 wks. R/B/A's discussed. Patient elects to proceed. Intravitreal Vabysmo injection performed into the right eye without complication.  

RTC: 6 weeks OCT OU; re-eval Vabysmo

## 2022-07-28 NOTE — IMPRESSION/PLAN
Impression: Nexdtve age-related mclr degn, left eye, intermed dry stage: H35.3122. OS. Status: Chronic. Plan: Patient notes mild distortion in vision. Exam and OCT demonstrate stable drusen and RPE changes confirming AMD is still dry. The patient was advised to continue AREDS 2 vitamin supplements; the risk of smoking was emphasized with the patient. The patient was also advised to continue to use an [address] to monitor the vision and to call immediately with any changes. Will closely monitor.

## 2022-08-25 ENCOUNTER — OFFICE VISIT (OUTPATIENT)
Dept: URBAN - METROPOLITAN AREA CLINIC 31 | Facility: CLINIC | Age: 87
End: 2022-08-25
Payer: MEDICARE

## 2022-08-25 DIAGNOSIS — H35.3122 NEXDTVE AGE-RELATED MCLR DEGN, LEFT EYE, INTERMED DRY STAGE: ICD-10-CM

## 2022-08-25 DIAGNOSIS — Z96.1 PRESENCE OF PSEUDOPHAKIA: Primary | ICD-10-CM

## 2022-08-25 DIAGNOSIS — H35.372 PUCKERING OF MACULA, LEFT EYE: ICD-10-CM

## 2022-08-25 DIAGNOSIS — H35.3211 EXDTVE AGE-REL MCLR DEGN, RIGHT EYE, WITH ACTV CHRDL NEOVAS: ICD-10-CM

## 2022-08-25 DIAGNOSIS — S05.11XA CONTUSION OF EYEBALL AND ORBITAL TISSUES, RIGHT EYE, INIT: ICD-10-CM

## 2022-08-25 PROCEDURE — 92134 CPTRZ OPH DX IMG PST SGM RTA: CPT | Performed by: OPHTHALMOLOGY

## 2022-08-25 PROCEDURE — 92012 INTRM OPH EXAM EST PATIENT: CPT | Performed by: OPHTHALMOLOGY

## 2022-08-25 PROCEDURE — 67028 INJECTION EYE DRUG: CPT | Performed by: OPHTHALMOLOGY

## 2022-08-25 ASSESSMENT — INTRAOCULAR PRESSURE
OS: 21
OD: 13

## 2022-08-25 NOTE — IMPRESSION/PLAN
Impression: Presence of pseudophakia: Z96. 1.OU Plan: Stable. Recent appt with Dr. Rommie Goodell was stable.

## 2022-08-25 NOTE — IMPRESSION/PLAN
Impression: Exdtve age-rel mclr degn, right eye, with actv chrdl neovas: H35.3211 OD. Status: Symptomatic. --worse at ~5 wks
-s/p Avastin last 08/31/17
-s/p Eylea last 05/12/2022
-s/p Beovu last 12/31/2020
-s/p Vabysmo last 07/28/2022 Plan: Exam and OCT reveal PED with atrophy with improved SRF tapering from 6 wks to 4 wks. Recommend Vabysmo today and maintain next visit to 4-6 wks. R/B/A's discussed. Patient elects to proceed. Intravitreal Vabysmo injection performed into the right eye without complication.  

RTC: 4-6 weeks OCT OU; straight Vabysmo OD #1/3

## 2022-08-25 NOTE — IMPRESSION/PLAN
Impression: Nexdtve age-related mclr degn, left eye, intermed dry stage: H35.3122. OS. Status: Chronic. Plan: Patient notes mild distortion in vision. Exam and OCT demonstrate stable drusen and RPE changes confirming AMD is still dry. The patient was advised to continue AREDS 2 vitamin supplements; the risk of smoking was emphasized with the patient. The patient was also advised to continue to use an [address] to monitor the vision and to call immediately with any changes. Will closely monitor.

## 2022-08-25 NOTE — IMPRESSION/PLAN
Impression: Contusion of eyeball and orbital tissues, right eye, init: S05.11xA. Plan: Now resolved.

## 2022-09-22 ENCOUNTER — PROCEDURE (OUTPATIENT)
Dept: URBAN - METROPOLITAN AREA CLINIC 31 | Facility: CLINIC | Age: 87
End: 2022-09-22
Payer: MEDICARE

## 2022-09-22 DIAGNOSIS — H35.3211 EXUDATIVE AGE-RELATED MACULAR DEGENERATION, RIGHT EYE, WITH ACTIVE CHOROIDAL NEOVASCULARIZATION: Primary | ICD-10-CM

## 2022-09-22 PROCEDURE — 67028 INJECTION EYE DRUG: CPT | Performed by: OPHTHALMOLOGY

## 2022-09-22 PROCEDURE — 92134 CPTRZ OPH DX IMG PST SGM RTA: CPT | Performed by: OPHTHALMOLOGY

## 2022-09-22 ASSESSMENT — INTRAOCULAR PRESSURE
OS: 13
OD: 14

## 2022-09-27 DIAGNOSIS — E03.9 HYPOTHYROIDISM, UNSPECIFIED TYPE: ICD-10-CM

## 2022-09-27 DIAGNOSIS — N18.9 ANEMIA IN CHRONIC KIDNEY DISEASE, UNSPECIFIED CKD STAGE: ICD-10-CM

## 2022-09-27 DIAGNOSIS — I11.9 HYPERTENSIVE HEART DISEASE WITHOUT HEART FAILURE: ICD-10-CM

## 2022-09-27 DIAGNOSIS — D63.1 ANEMIA IN CHRONIC KIDNEY DISEASE, UNSPECIFIED CKD STAGE: ICD-10-CM

## 2022-09-27 DIAGNOSIS — N18.9 CHRONIC KIDNEY DISEASE, UNSPECIFIED CKD STAGE: ICD-10-CM

## 2022-09-27 DIAGNOSIS — M10.9 ARTHRITIS DUE TO GOUT: ICD-10-CM

## 2022-09-29 RX ORDER — QUINAPRIL 40 MG/1
TABLET ORAL
Qty: 180 TABLET | Refills: 3 | Status: SHIPPED | OUTPATIENT
Start: 2022-09-29

## 2022-10-31 DIAGNOSIS — R30.0 DYSURIA: ICD-10-CM

## 2022-10-31 DIAGNOSIS — R30.0 DYSURIA: Primary | ICD-10-CM

## 2022-10-31 LAB
BACTERIA: ABNORMAL /HPF
BILIRUBIN URINE: NEGATIVE
BLOOD, URINE: NEGATIVE
CLARITY: ABNORMAL
COLOR: YELLOW
CRYSTALS, UA: ABNORMAL /HPF
EPITHELIAL CELLS, UA: 9 /HPF (ref 0–5)
GLUCOSE URINE: NEGATIVE MG/DL
HYALINE CASTS: 3 /HPF (ref 0–8)
KETONES, URINE: ABNORMAL MG/DL
LEUKOCYTE ESTERASE, URINE: ABNORMAL
NITRITE, URINE: POSITIVE
PH UA: 6 (ref 5–8)
PROTEIN UA: 100 MG/DL
RBC UA: 1 /HPF (ref 0–4)
SPECIFIC GRAVITY UA: 1.02 (ref 1–1.03)
UROBILINOGEN, URINE: 0.2 E.U./DL
WBC UA: 17 /HPF (ref 0–5)

## 2022-10-31 RX ORDER — CEPHALEXIN 250 MG/1
250 CAPSULE ORAL 2 TIMES DAILY
Qty: 14 CAPSULE | Refills: 0 | Status: SHIPPED | OUTPATIENT
Start: 2022-10-31

## 2022-11-03 ENCOUNTER — PROCEDURE (OUTPATIENT)
Dept: URBAN - METROPOLITAN AREA CLINIC 31 | Facility: CLINIC | Age: 87
End: 2022-11-03
Payer: MEDICARE

## 2022-11-03 DIAGNOSIS — H35.3211 EXUDATIVE AGE-RELATED MACULAR DEGENERATION, RIGHT EYE, WITH ACTIVE CHOROIDAL NEOVASCULARIZATION: Primary | ICD-10-CM

## 2022-11-03 LAB
ORGANISM: ABNORMAL
ORGANISM: ABNORMAL
URINE CULTURE, ROUTINE: ABNORMAL

## 2022-11-03 PROCEDURE — 67028 INJECTION EYE DRUG: CPT | Performed by: OPHTHALMOLOGY

## 2022-11-03 PROCEDURE — 92134 CPTRZ OPH DX IMG PST SGM RTA: CPT | Performed by: OPHTHALMOLOGY

## 2022-11-03 ASSESSMENT — INTRAOCULAR PRESSURE
OD: 19
OS: 12

## 2022-11-04 ENCOUNTER — TELEPHONE (OUTPATIENT)
Dept: INTERNAL MEDICINE | Age: 87
End: 2022-11-04

## 2022-11-04 DIAGNOSIS — I11.9 HYPERTENSIVE HEART DISEASE WITHOUT HEART FAILURE: ICD-10-CM

## 2022-11-04 DIAGNOSIS — E03.9 HYPOTHYROIDISM, UNSPECIFIED TYPE: ICD-10-CM

## 2022-11-04 DIAGNOSIS — D63.1 ANEMIA IN CHRONIC KIDNEY DISEASE, UNSPECIFIED CKD STAGE: ICD-10-CM

## 2022-11-04 DIAGNOSIS — M10.9 ARTHRITIS DUE TO GOUT: ICD-10-CM

## 2022-11-04 DIAGNOSIS — N18.9 CHRONIC KIDNEY DISEASE, UNSPECIFIED CKD STAGE: ICD-10-CM

## 2022-11-04 DIAGNOSIS — N18.9 ANEMIA IN CHRONIC KIDNEY DISEASE, UNSPECIFIED CKD STAGE: ICD-10-CM

## 2022-11-04 PROBLEM — H54.3 LOW VISION, BOTH EYES: Status: ACTIVE | Noted: 2022-03-23

## 2022-11-04 RX ORDER — ALLOPURINOL 300 MG/1
TABLET ORAL
Qty: 90 TABLET | Refills: 0 | Status: SHIPPED | OUTPATIENT
Start: 2022-11-04

## 2022-11-07 ENCOUNTER — OFFICE VISIT (OUTPATIENT)
Dept: INTERNAL MEDICINE | Age: 87
End: 2022-11-07
Payer: MEDICARE

## 2022-11-07 VITALS
SYSTOLIC BLOOD PRESSURE: 124 MMHG | HEART RATE: 64 BPM | TEMPERATURE: 99 F | OXYGEN SATURATION: 96 % | DIASTOLIC BLOOD PRESSURE: 68 MMHG
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
## 2022-11-07 DIAGNOSIS — E03.9 HYPOTHYROIDISM, UNSPECIFIED TYPE: ICD-10-CM

## 2022-11-07 DIAGNOSIS — I10 ESSENTIAL HYPERTENSION: ICD-10-CM

## 2022-11-07 DIAGNOSIS — E87.6 HYPOKALEMIA: ICD-10-CM

## 2022-11-07 PROCEDURE — G8420 CALC BMI NORM PARAMETERS: HCPCS | Performed by: NURSE PRACTITIONER

## 2022-11-07 PROCEDURE — 1123F ACP DISCUSS/DSCN MKR DOCD: CPT | Performed by: NURSE PRACTITIONER

## 2022-11-07 PROCEDURE — 99214 OFFICE O/P EST MOD 30 MIN: CPT | Performed by: NURSE PRACTITIONER

## 2022-11-07 PROCEDURE — G8484 FLU IMMUNIZE NO ADMIN: HCPCS | Performed by: NURSE PRACTITIONER

## 2022-11-07 PROCEDURE — G8427 DOCREV CUR MEDS BY ELIG CLIN: HCPCS | Performed by: NURSE PRACTITIONER

## 2022-11-07 PROCEDURE — 1036F TOBACCO NON-USER: CPT | Performed by: NURSE PRACTITIONER

## 2022-11-07 PROCEDURE — 1090F PRES/ABSN URINE INCON ASSESS: CPT | Performed by: NURSE PRACTITIONER

## 2022-11-07 ASSESSMENT — ENCOUNTER SYMPTOMS
EYE ITCHING: 0
VOMITING: 0
CONSTIPATION: 0
WHEEZING: 0
SHORTNESS OF BREATH: 0
STRIDOR: 0
COLOR CHANGE: 0
EYE DISCHARGE: 0
TROUBLE SWALLOWING: 0
CHOKING: 0
DIARRHEA: 0
ABDOMINAL DISTENTION: 0
SORE THROAT: 0
BLOOD IN STOOL: 0
NAUSEA: 0
COUGH: 0
ABDOMINAL PAIN: 0

## 2022-11-07 NOTE — ASSESSMENT & PLAN NOTE
She has been taking daily they think.   With the confusion her daughters been trying to help and so she found her thyroid medicine on the counter

## 2022-11-07 NOTE — PATIENT INSTRUCTIONS
Hypertension;  start taking 1/2 quinapril twice daily   Take your pressure at least an hour after you take your medications; If goes up over 140/90 then start taking the whole pill twiice daily   2. Fall  Looks ok  I dont see any reason to xray  anything ; shoulder is bruised but good ROM  3. Hypothyroidism;  take thyroid meds on a empty stomach; daily   4.   Hypokalemia;  she is taking 99 mg;  you can take every other day

## 2022-11-07 NOTE — ASSESSMENT & PLAN NOTE
Does not appear to have any fracture there is some bruising her left arm is minimal and range of motion is good

## 2022-11-07 NOTE — ASSESSMENT & PLAN NOTE
She had been taking an over-the-counter potassium 99 mg as her potassium is hovering toward had normal we will just take that every other day

## 2022-11-07 NOTE — ASSESSMENT & PLAN NOTE
They report that many times her blood pressure is really low and so Ms. Danny Mcdonald skips her blood pressure medicine so we will go to just one half twice daily instead to hold 40 mg of quinapril

## 2022-11-07 NOTE — PROGRESS NOTES
[facility] PHYSICIAN SERVICES  [facility] INTERNAL MEDICINE  [address] [address] [place] [address]  Dept: [phone]  Dept Fax: [phone]: [phone]    Sonia Vogt (:  3/22/1927) is a 80 y.o. female,Established patient  with [facility], here for evaluation of the following chief complaint(s): Other (Just not felt well since flu vac in [place] med for uti)      Sonia Vogt is a 80 y.o. female who presents today for her medical conditions/complaints as noted below. Sonia Vogt is c/colleen Other (Just not felt well since flu vac in [place] med for uti)        HPI:     Chief Complaint   Patient presents with    Other     Just not felt well since flu vac in [place] med for uti     HPI    UTI last week    She was put on Keflex last week ; they think that is a little better but she is still confused;   2. Fall from bed last week with UTIi; has pain in left shoulder; The bruising is improved today;  she has good ROM;   3.   Hypokalemia;  she is stable;  they want to take QOD      Past Medical History:   Diagnosis Date    Anemia     Arthritis due to gout     Chronic kidney disease     Eczema     Fibrocystic breast disease (FCBD)     Fibromyalgia     Hypertensive heart disease     Migraine     Mixed hyperlipidemia     Osteoarthritis     Skin cancer     Dr Melanie Hudson, rt ant tibial region, ,  Dr Anjel Bonner      Past Surgical History:   Procedure Laterality Date    APPENDECTOMY      COLONOSCOPY      HYSTERECTOMY (CERVIX STATUS UNKNOWN)         Vitals 2022/8422   SYSTOLIC 316 [phone]   DIASTOLIC 68 85 78 80 80 80   Site - - - Left Upper Arm Left Upper Arm Left Upper Arm   Position - - - - Sitting Sitting   Cuff Size - - - - Medium Adult Medium Adult   Pulse 64 76 80 - 75 85   Temp 99 - - - - -   Resp - - - - - -   SpO2 96 95 95 - 99 92   Weight - 116 lb 117 lb - 107 lb 9.6 oz 109 lb 12.8 oz   Height - 5' 0\" 5' 0\" - 5' 0\" 5' 0\" Body mass index - 22.65 kg/m2 22.85 kg/m2 - 21.01 kg/m2 21.44 kg/m2   Some recent data might be hidden       Family History   Problem Relation Age of Onset    Other Mother         thrombocytopenia    Other Father         skin cancer       Social History     Tobacco Use    Smoking status: Former    Smokeless tobacco: Never   Substance Use Topics    Alcohol use: Not on file      Current Outpatient Medications   Medication Sig Dispense Refill    allopurinol (ZYLOPRIM) 300 MG tablet TAKE 1 TABLET DAILY (NEED APPOINTMENT FOR REFILLS) 90 tablet 0    cephALEXin (KEFLEX) 250 MG capsule Take 1 capsule by mouth 2 times daily 14 capsule 0    quinapril (ACCUPRIL) 40 MG tablet TAKE 1 TABLET TWO TIMES DAILY 180 tablet 3    cloNIDine (CATAPRES) 0.1 MG tablet TAKE 1 TABLET DAILY AS NEEDED FOR HIGH BLOOD PRESSURE 90 tablet 3    levothyroxine (SYNTHROID) 50 MCG tablet TAKE 1 TABLET DAILY (NEED AN APPOINTMENT) 90 tablet 3    brimonidine-timolol (COMBIGAN) 0.2-0.5 % ophthalmic solution Daily      cycloSPORINE (RESTASIS) 0.05 % ophthalmic emulsion 1 drop 2 times daily      Multiple Vitamins-Minerals (THERAPEUTIC MULTIVITAMIN-MINERALS) tablet Take 1 tablet by mouth daily      Acetaminophen (TYLENOL ARTHRITIS PAIN PO) Take by mouth       No current facility-administered medications for this visit.      Allergies   Allergen Reactions    Biaxin [Clarithromycin]     Vioxx [Rofecoxib]        Health Maintenance   Topic Date Due    Shingles vaccine (2 of 3) 10/28/2015    COVID-19 Vaccine (3 - Booster for Pfizer series) 05/08/2021    Flu vaccine (1) 08/01/2022    Annual Wellness Visit (AWV)  08/13/2022    Depression Screen  04/19/2023    DTaP/Tdap/Td vaccine (2 - Td or Tdap) 12/18/2024    Pneumococcal 65+ years Vaccine  Completed    Hepatitis A vaccine  Aged Out    Hib vaccine  Aged Out    Meningococcal (ACWY) vaccine  Aged Out       No results found for: LABA1C  No results found for: PSA, PSADIA  TSH   Date Value Ref Range Status   04/19/2022 1.850 0.270 - 4.200 uIU/mL Final   ]  Lab Results   Component Value Date     04/19/2022    K 4.7 04/19/2022     04/19/2022    CO2 26 04/19/2022    BUN 35 (H) 04/19/2022    CREATININE 1.5 (H) 04/19/2022    GLUCOSE 107 04/19/2022    CALCIUM 10.2 (H) 04/19/2022    PROT 7.1 04/19/2022    LABALBU 4.6 04/19/2022    BILITOT <0.2 04/19/2022    ALKPHOS 69 04/19/2022    AST 21 04/19/2022    ALT 13 04/19/2022    LABGLOM 32 (A) 04/19/2022    GFRAA 39 (L) 04/19/2022     No results found for: CHOL  No results found for: TRIG  Lab Results   Component Value Date    HDL 55 (L) 08/02/2021     Lab Results   Component Value Date    LDLCALC 139 08/02/2021     Lab Results   Component Value Date/Time     04/19/2022 03:26 PM    K 4.7 04/19/2022 03:26 PM     04/19/2022 03:26 PM    CO2 26 04/19/2022 03:26 PM    BUN 35 04/19/2022 03:26 PM    CREATININE 1.5 04/19/2022 03:26 PM    GLUCOSE 107 04/19/2022 03:26 PM    CALCIUM 10.2 04/19/2022 03:26 PM      Lab Results   Component Value Date    WBC 7.5 04/19/2022    HGB 12.5 04/19/2022    HCT 40.1 04/19/2022    .3 (H) 04/19/2022     04/19/2022    LYMPHOPCT 19.3 (L) 08/02/2021    RBC 4.00 (L) 04/19/2022    MCH 31.3 (H) 04/19/2022    MCHC 31.2 (L) 04/19/2022    RDW 13.8 04/19/2022     No results found for: VITD25    Subjective:      Review of Systems   Constitutional:  Negative for fatigue, fever and unexpected weight change. HENT:  Negative for ear discharge, ear pain, mouth sores, sore throat and trouble swallowing. Eyes:  Negative for discharge, itching and visual disturbance. Respiratory:  Negative for cough, choking, shortness of breath, wheezing and stridor. Cardiovascular:  Negative for chest pain, palpitations and leg swelling. Gastrointestinal:  Negative for abdominal distention, abdominal pain, blood in stool, constipation, diarrhea, nausea and vomiting. Endocrine: Negative for cold intolerance, polydipsia and polyuria.    Genitourinary: Positive for dysuria. Negative for difficulty urinating, frequency and urgency. Musculoskeletal:  Positive for arthralgias. Negative for gait problem. Skin:  Negative for color change and rash. Allergic/Immunologic: Negative for food allergies and immunocompromised state. Neurological:  Negative for dizziness, tremors, syncope, speech difficulty, weakness and headaches. Hematological:  Negative for adenopathy. Does not bruise/bleed easily. Psychiatric/Behavioral:  Positive for confusion. Negative for hallucinations. Objective:     Physical Exam  Constitutional:       General: She is not in acute distress. Appearance: She is well-developed. HENT:      Head: Normocephalic and atraumatic. Eyes:      General: No scleral icterus. Right eye: No discharge. Left eye: No discharge. Pupils: Pupils are equal, round, and reactive to light. Neck:      Thyroid: No thyromegaly. Vascular: No JVD. Cardiovascular:      Rate and Rhythm: Normal rate and regular rhythm. Heart sounds: Normal heart sounds. No murmur heard. Pulmonary:      Effort: Pulmonary effort is normal. No respiratory distress. Breath sounds: Normal breath sounds. No wheezing or rales. Abdominal:      General: Bowel sounds are normal. There is no distension. Palpations: Abdomen is soft. There is no mass. Tenderness: There is no abdominal tenderness. There is no guarding or rebound. Musculoskeletal:         General: No tenderness. Normal range of motion. Arms:       Cervical back: Normal range of motion and neck supple. Comments: She has bruising left upper forearm she has good range of motion she can lift her arm over her head bicep intact I do not feel there is any recent x-ray   Skin:     General: Skin is warm and dry. Coloration: Skin is pale. Findings: No erythema or rash. Neurological:      Mental Status: She is alert and oriented to person, place, and time. Cranial Nerves: No cranial nerve deficit. Coordination: Coordination normal.      Deep Tendon Reflexes: Reflexes are normal and symmetric. Reflexes normal.   Psychiatric:         Mood and Affect: Mood is not depressed. Behavior: Behavior normal.         Thought Content: Thought content normal.         Judgment: Judgment normal.     /68   Pulse 64   Temp 99 °F (37.2 °C)   SpO2 96%           Assessment:      Problem List       Fall     Does not appear to have any fracture there is some bruising her left arm is minimal and range of motion is good          Hypokalemia     She had been taking an over-the-counter potassium 99 mg as her potassium is hovering toward had normal we will just take that every other day          Essential hypertension     They report that many times her blood pressure is really low and so Ms. Catherine Savage skips her blood pressure medicine so we will go to just one half twice daily instead to hold 40 mg of quinapril          Hypothyroidism     She has been taking daily they think. With the confusion her daughters been trying to help and so she found her thyroid medicine on the counter          Relevant Medications    levothyroxine (SYNTHROID) 50 MCG tablet    cloNIDine (CATAPRES) 0.1 MG tablet    quinapril (ACCUPRIL) 40 MG tablet    allopurinol (ZYLOPRIM) 300 MG tablet       Plan:        Patient given educational materials - see patient instructions. Discussed use, benefit, and side effects of prescribed medications. Allpatient questions answered. Pt voiced understanding. Reviewed health maintenance. Instructed to continue current medications, diet and exercise. Patient agreed with treatment plan. Follow up as directed. MEDICATIONS:  No orders of the defined types were placed in this encounter. ORDERS:  No orders of the defined types were placed in this encounter. Follow-up:  No follow-ups on file.     PATIENT INSTRUCTIONS:  Patient Instructions    Hypertension; start taking 1/2 quinapril twice daily   Take your pressure at least an hour after you take your medications; If goes up over 140/90 then start taking the whole pill twiice daily   2. Fall  Looks ok  I dont see any reason to xray  anything ; shoulder is bruised but good ROM  3. Hypothyroidism;  take thyroid meds on a empty stomach; daily   4. Hypokalemia;  she is taking 99 mg;  you can take every other day   Electronically signed by KATERINA Ulloa on 11/7/2022 at 4:33 PM    @On this date 11/7/2022 I have spent 32 minutes reviewing previous notes, test results and face to face with the patient discussing the diagnosis and importance of compliance with the treatment plan as well as documenting on the day of the visit. We also had to go over all of her medicines that her bag with her daughter because it is time to take different instructions etc. this was a little time-consuming    EMRDragon/transcription disclaimer:  Much of this encounter note is electronic transcription/translation of spoken language to printed texts. The electronic translation of spoken language may be erroneous, or at times,nonsensical words or phrases may be inadvertently transcribed.   Although I have reviewed the note for such errors, some may still exist.

## 2022-12-05 ENCOUNTER — TELEPHONE (OUTPATIENT)
Dept: INTERNAL MEDICINE | Age: 87
End: 2022-12-05

## 2022-12-05 NOTE — TELEPHONE ENCOUNTER
----- Message from Michell Dragan sent at 12/5/2022  3:14 PM CST -----  Subject: Appointment Request    Reason for Call: Established Patient Appointment needed: Urgent (Patient   Request) No Script    QUESTIONS    Reason for appointment request? No appointments available during search     Additional Information for Provider? Pt needs paperwork filled out to be   admitted to a nursing home facility. Daughter Jose C Reeder would like to know if   she can be seen this month by any practitioner. Please call back.    ---------------------------------------------------------------------------  --------------  Neftali GERADR  [phone]; OK to leave message on voicemail  ---------------------------------------------------------------------------  --------------  SCRIPT ANSWERS  COVID Screen: Carter Mcdonald
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
## 2022-12-15 ENCOUNTER — OFFICE VISIT (OUTPATIENT)
Dept: INTERNAL MEDICINE | Age: 87
End: 2022-12-15
Payer: MEDICARE

## 2022-12-15 ENCOUNTER — PROCEDURE (OUTPATIENT)
Dept: URBAN - METROPOLITAN AREA CLINIC 31 | Facility: CLINIC | Age: 87
End: 2022-12-15
Payer: MEDICARE

## 2022-12-15 VITALS
BODY MASS INDEX: 21.68 KG/M2 | HEART RATE: 61 BPM | DIASTOLIC BLOOD PRESSURE: 64 MMHG | SYSTOLIC BLOOD PRESSURE: 122 MMHG | TEMPERATURE: 96.8 F | OXYGEN SATURATION: 95 % | WEIGHT: 111 LBS

## 2022-12-15 DIAGNOSIS — I11.9 HYPERTENSIVE HEART DISEASE WITHOUT HEART FAILURE: ICD-10-CM

## 2022-12-15 DIAGNOSIS — R30.0 DYSURIA: ICD-10-CM

## 2022-12-15 DIAGNOSIS — H35.3211 EXUDATIVE AGE-RELATED MACULAR DEGENERATION, RIGHT EYE, WITH ACTIVE CHOROIDAL NEOVASCULARIZATION: Primary | ICD-10-CM

## 2022-12-15 DIAGNOSIS — M10.9 ARTHRITIS DUE TO GOUT: ICD-10-CM

## 2022-12-15 DIAGNOSIS — N18.9 CHRONIC KIDNEY DISEASE, UNSPECIFIED CKD STAGE: ICD-10-CM

## 2022-12-15 DIAGNOSIS — R19.7 DIARRHEA DUE TO MALABSORPTION: ICD-10-CM

## 2022-12-15 DIAGNOSIS — R30.0 DYSURIA: Primary | ICD-10-CM

## 2022-12-15 DIAGNOSIS — N18.9 ANEMIA IN CHRONIC KIDNEY DISEASE, UNSPECIFIED CKD STAGE: ICD-10-CM

## 2022-12-15 DIAGNOSIS — D63.1 ANEMIA IN CHRONIC KIDNEY DISEASE, UNSPECIFIED CKD STAGE: ICD-10-CM

## 2022-12-15 DIAGNOSIS — K90.9 DIARRHEA DUE TO MALABSORPTION: ICD-10-CM

## 2022-12-15 DIAGNOSIS — E03.9 HYPOTHYROIDISM, UNSPECIFIED TYPE: ICD-10-CM

## 2022-12-15 LAB
BACTERIA: NEGATIVE /HPF
BILIRUBIN URINE: NEGATIVE
BLOOD, URINE: NEGATIVE
CLARITY: CLEAR
COLOR: YELLOW
CRYSTALS, UA: ABNORMAL /HPF
EPITHELIAL CELLS, UA: 12 /HPF (ref 0–5)
GLUCOSE URINE: NEGATIVE MG/DL
HYALINE CASTS: 7 /HPF (ref 0–8)
KETONES, URINE: NEGATIVE MG/DL
LEUKOCYTE ESTERASE, URINE: ABNORMAL
NITRITE, URINE: NEGATIVE
PH UA: 6 (ref 5–8)
PROTEIN UA: 100 MG/DL
RBC UA: 1 /HPF (ref 0–4)
SPECIFIC GRAVITY UA: 1.01 (ref 1–1.03)
UROBILINOGEN, URINE: 0.2 E.U./DL
WBC UA: 5 /HPF (ref 0–5)

## 2022-12-15 PROCEDURE — 1036F TOBACCO NON-USER: CPT | Performed by: NURSE PRACTITIONER

## 2022-12-15 PROCEDURE — G8420 CALC BMI NORM PARAMETERS: HCPCS | Performed by: NURSE PRACTITIONER

## 2022-12-15 PROCEDURE — G8428 CUR MEDS NOT DOCUMENT: HCPCS | Performed by: NURSE PRACTITIONER

## 2022-12-15 PROCEDURE — 67028 INJECTION EYE DRUG: CPT | Performed by: OPHTHALMOLOGY

## 2022-12-15 PROCEDURE — 1123F ACP DISCUSS/DSCN MKR DOCD: CPT | Performed by: NURSE PRACTITIONER

## 2022-12-15 PROCEDURE — 92134 CPTRZ OPH DX IMG PST SGM RTA: CPT | Performed by: OPHTHALMOLOGY

## 2022-12-15 PROCEDURE — 1090F PRES/ABSN URINE INCON ASSESS: CPT | Performed by: NURSE PRACTITIONER

## 2022-12-15 PROCEDURE — 99213 OFFICE O/P EST LOW 20 MIN: CPT | Performed by: NURSE PRACTITIONER

## 2022-12-15 PROCEDURE — G8484 FLU IMMUNIZE NO ADMIN: HCPCS | Performed by: NURSE PRACTITIONER

## 2022-12-15 RX ORDER — QUINAPRIL 20 MG/1
20 TABLET ORAL 2 TIMES DAILY
Qty: 180 TABLET | Refills: 1 | Status: SHIPPED | OUTPATIENT
Start: 2022-12-15

## 2022-12-15 RX ORDER — LEVOTHYROXINE SODIUM 0.05 MG/1
TABLET ORAL
Qty: 90 TABLET | Refills: 3 | Status: SHIPPED | OUTPATIENT
Start: 2022-12-15

## 2022-12-15 ASSESSMENT — ENCOUNTER SYMPTOMS
BLOOD IN STOOL: 0
COUGH: 0
DIARRHEA: 0
VOMITING: 0
CONSTIPATION: 0
EYE DISCHARGE: 0
NAUSEA: 0
ABDOMINAL DISTENTION: 0
WHEEZING: 0
SHORTNESS OF BREATH: 0
EYE ITCHING: 0
CHOKING: 0
SORE THROAT: 0
TROUBLE SWALLOWING: 0
ABDOMINAL PAIN: 0
STRIDOR: 0
COLOR CHANGE: 0

## 2022-12-15 ASSESSMENT — INTRAOCULAR PRESSURE
OS: 12
OD: 14

## 2022-12-15 NOTE — ASSESSMENT & PLAN NOTE
Daughter has found it is due to her drinking a lot of fruit juice this and her diarrhea has essentially resolved

## 2022-12-15 NOTE — PROGRESS NOTES
[facility] PHYSICIAN SERVICES  [facility] INTERNAL MEDICINE  [address] [address] [place] [address]  Dept: [phone]  Dept Fax: [phone]: [phone]    Liana Dixon (:  3/22/1927) is a 80 y.o. female,Established patient  with [facility] , here for evaluation of the following chief complaint(s): Other (Home health evaluation , possible uti )      Liana Dixon is a 80 y.o. female who presents today for her medical conditions/complaints as noted below. Liana Dixon is c/colleen Other (Home health evaluation , possible uti )        HPI:     Chief Complaint   Patient presents with    Other     Home health evaluation , possible uti      HPI   \3. Hypertension    quinipril 20 BID   her daughter had to take her meds; home with her as Ms Alicia Barbosa was not taking meds correctly    2. Hypothyroidism;  on 50 mcg daily   3. Dysuria;  with come confusion;    4.  Diarrhea is better after getting of Myles fruit juice    They are planning to get her to assisted living they are on the list at [facility];  and they gaurang need meds given to her;  she needs help cooking  so snow has meals on wheels;      Past Medical History:   Diagnosis Date    Anemia     Arthritis due to gout     Chronic kidney disease     Eczema     Fibrocystic breast disease (FCBD)     Fibromyalgia     Hypertensive heart disease     Migraine     Mixed hyperlipidemia     Osteoarthritis     Skin cancer     Dr Amada Hernandez, rt ant tibial region, ,  Dr Carlos Walker      Past Surgical History:   Procedure Laterality Date    APPENDECTOMY      COLONOSCOPY      HYSTERECTOMY (CERVIX STATUS UNKNOWN)         Vitals 12/15/2022 2022 2022 2021 2019    SYSTOLIC 204 [phone]   DIASTOLIC 64 68 85 78 80 80   Site - - - - Left Upper Arm Left Upper Arm   Position - - - - - Sitting   Cuff Size - - - - - Medium Adult   Pulse 61 64 76 80 - 75   Temp 96.8 99 - - - -   Resp - - - - - -   SpO2 95 96 95 95 - 99   Weight 111 lb - 116 lb 117 lb - 107 lb 9.6 oz   Height - - 5' 0\" 5' 0\" - 5' 0\"   Body mass index - - 22.65 kg/m2 22.85 kg/m2 - 21.01 kg/m2   Some recent data might be hidden       Family History   Problem Relation Age of Onset    Other Mother         thrombocytopenia    Other Father         skin cancer       Social History     Tobacco Use    Smoking status: Former    Smokeless tobacco: Never   Substance Use Topics    Alcohol use: Not on file      Current Outpatient Medications   Medication Sig Dispense Refill    levothyroxine (SYNTHROID) 50 MCG tablet TAKE 1 TABLET DAILY (NEED AN APPOINTMENT) 90 tablet 3    quinapril (ACCUPRIL) 20 MG tablet Take 1 tablet by mouth in the morning and at bedtime 180 tablet 1    allopurinol (ZYLOPRIM) 300 MG tablet TAKE 1 TABLET DAILY (NEED APPOINTMENT FOR REFILLS) 90 tablet 0    cephALEXin (KEFLEX) 250 MG capsule Take 1 capsule by mouth 2 times daily 14 capsule 0    cloNIDine (CATAPRES) 0.1 MG tablet TAKE 1 TABLET DAILY AS NEEDED FOR HIGH BLOOD PRESSURE 90 tablet 3    brimonidine-timolol (COMBIGAN) 0.2-0.5 % ophthalmic solution Daily      cycloSPORINE (RESTASIS) 0.05 % ophthalmic emulsion 1 drop 2 times daily      Multiple Vitamins-Minerals (THERAPEUTIC MULTIVITAMIN-MINERALS) tablet Take 1 tablet by mouth daily      Acetaminophen (TYLENOL ARTHRITIS PAIN PO) Take by mouth       No current facility-administered medications for this visit.      Allergies   Allergen Reactions    Biaxin [Clarithromycin]     Vioxx [Rofecoxib]        Health Maintenance   Topic Date Due    Shingles vaccine (2 of 3) 10/28/2015    COVID-19 Vaccine (3 - Booster for Pfizer series) 05/08/2021    Annual Wellness Visit (AWV)  08/13/2022    Depression Screen  04/19/2023    DTaP/Tdap/Td vaccine (2 - Td or Tdap) 12/18/2024    Flu vaccine  Completed    Pneumococcal 65+ years Vaccine  Completed    Hepatitis A vaccine  Aged Out    Hib vaccine  Aged Out    Meningococcal (ACWY) vaccine  Aged Out       No results found for: LABA1C  No results found for: PSA, PSADIA  TSH   Date Value Ref Range Status   04/19/2022 1.850 0.270 - 4.200 uIU/mL Final   ]  Lab Results   Component Value Date     04/19/2022    K 4.7 04/19/2022     04/19/2022    CO2 26 04/19/2022    BUN 35 (H) 04/19/2022    CREATININE 1.5 (H) 04/19/2022    GLUCOSE 107 04/19/2022    CALCIUM 10.2 (H) 04/19/2022    PROT 7.1 04/19/2022    LABALBU 4.6 04/19/2022    BILITOT <0.2 04/19/2022    ALKPHOS 69 04/19/2022    AST 21 04/19/2022    ALT 13 04/19/2022    LABGLOM 32 (A) 04/19/2022    GFRAA 39 (L) 04/19/2022     No results found for: CHOL  No results found for: TRIG  Lab Results   Component Value Date    HDL 55 (L) 08/02/2021     Lab Results   Component Value Date    LDLCALC 139 08/02/2021     Lab Results   Component Value Date/Time     04/19/2022 03:26 PM    K 4.7 04/19/2022 03:26 PM     04/19/2022 03:26 PM    CO2 26 04/19/2022 03:26 PM    BUN 35 04/19/2022 03:26 PM    CREATININE 1.5 04/19/2022 03:26 PM    GLUCOSE 107 04/19/2022 03:26 PM    CALCIUM 10.2 04/19/2022 03:26 PM      Lab Results   Component Value Date    WBC 7.5 04/19/2022    HGB 12.5 04/19/2022    HCT 40.1 04/19/2022    .3 (H) 04/19/2022     04/19/2022    LYMPHOPCT 19.3 (L) 08/02/2021    RBC 4.00 (L) 04/19/2022    MCH 31.3 (H) 04/19/2022    MCHC 31.2 (L) 04/19/2022    RDW 13.8 04/19/2022     No results found for: VITD25    Subjective:      Review of Systems   Constitutional:  Negative for fatigue, fever and unexpected weight change. HENT:  Negative for ear discharge, ear pain, mouth sores, sore throat and trouble swallowing. Eyes:  Negative for discharge, itching and visual disturbance. Respiratory:  Negative for cough, choking, shortness of breath, wheezing and stridor. Cardiovascular:  Negative for chest pain, palpitations and leg swelling. Gastrointestinal:  Negative for abdominal distention, abdominal pain, blood in stool, constipation, diarrhea, nausea and vomiting.    Endocrine: Negative for cold intolerance, polydipsia and polyuria. Genitourinary:  Positive for dysuria. Negative for difficulty urinating, frequency and urgency. Musculoskeletal:  Negative for arthralgias and gait problem. Skin:  Negative for color change and rash. Allergic/Immunologic: Negative for food allergies and immunocompromised state. Neurological:  Negative for dizziness, tremors, syncope, speech difficulty, weakness and headaches. Hematological:  Negative for adenopathy. Does not bruise/bleed easily. Psychiatric/Behavioral:  Negative for confusion and hallucinations. Objective:     Physical Exam  Constitutional:       General: She is not in acute distress. Appearance: She is well-developed. HENT:      Head: Normocephalic and atraumatic. Eyes:      General: No scleral icterus. Right eye: No discharge. Left eye: No discharge. Pupils: Pupils are equal, round, and reactive to light. Neck:      Thyroid: No thyromegaly. Vascular: No JVD. Cardiovascular:      Rate and Rhythm: Normal rate and regular rhythm. Heart sounds: Normal heart sounds. No murmur heard. Pulmonary:      Effort: Pulmonary effort is normal. No respiratory distress. Breath sounds: Normal breath sounds. No wheezing or rales. Abdominal:      General: Bowel sounds are normal. There is no distension. Palpations: Abdomen is soft. There is no mass. Tenderness: There is no abdominal tenderness. There is no guarding or rebound. Musculoskeletal:         General: No tenderness. Normal range of motion. Cervical back: Normal range of motion and neck supple. Skin:     General: Skin is warm and dry. Findings: No erythema or rash. Neurological:      Mental Status: She is alert and oriented to person, place, and time. Cranial Nerves: No cranial nerve deficit. Coordination: Coordination normal.      Deep Tendon Reflexes: Reflexes are normal and symmetric.  Reflexes normal. Dispense:  180 tablet     Refill:  1         ORDERS:  Orders Placed This Encounter   Procedures    Urinalysis with Reflex to Culture         Follow-up:  No follow-ups on file. PATIENT INSTRUCTIONS:  Patient Instructions   1. Hypertension we will cut the quinapril down to 20 daily  2. Hypothyroidism stable with 50 mcg daily  3 dysuria we will send them the supplies to go home and to collect a urine as she cannot go while she is here. Her last urine had 2 organisms but cefdinir would work for both  Her for diarrhea  Electronically signed by KATERINA Taylor on 12/15/2022 at 11:30 AM    @    Larry/transcription disclaimer:  Much of this encounter note is electronic transcription/translation of spoken language to printed texts. The electronic translation of spoken language may be erroneous, or at times,nonsensical words or phrases may be inadvertently transcribed.   Although I have reviewed the note for such errors, some may still exist.

## 2022-12-15 NOTE — PATIENT INSTRUCTIONS
1.  Hypertension we will cut the quinapril down to 20 daily  2. Hypothyroidism stable with 50 mcg daily  3 dysuria we will send them the supplies to go home and to collect a urine as she cannot go while she is here.   Her last urine had 2 organisms but cefdinir would work for both  Her for diarrhea

## 2023-01-18 ENCOUNTER — OFFICE VISIT (OUTPATIENT)
Dept: INTERNAL MEDICINE | Age: 88
End: 2023-01-18

## 2023-01-18 VITALS
OXYGEN SATURATION: 95 % | DIASTOLIC BLOOD PRESSURE: 90 MMHG | SYSTOLIC BLOOD PRESSURE: 170 MMHG | WEIGHT: 114 LBS | BODY MASS INDEX: 22.26 KG/M2 | TEMPERATURE: 97.5 F | HEART RATE: 80 BPM

## 2023-01-18 DIAGNOSIS — M10.9 ARTHRITIS DUE TO GOUT: ICD-10-CM

## 2023-01-18 DIAGNOSIS — F01.B0 MODERATE VASCULAR DEMENTIA WITHOUT BEHAVIORAL DISTURBANCE, PSYCHOTIC DISTURBANCE, MOOD DISTURBANCE, OR ANXIETY: ICD-10-CM

## 2023-01-18 DIAGNOSIS — E87.6 HYPOKALEMIA: Primary | ICD-10-CM

## 2023-01-18 DIAGNOSIS — N18.9 ANEMIA IN CHRONIC KIDNEY DISEASE, UNSPECIFIED CKD STAGE: ICD-10-CM

## 2023-01-18 DIAGNOSIS — N18.9 CHRONIC KIDNEY DISEASE, UNSPECIFIED CKD STAGE: ICD-10-CM

## 2023-01-18 DIAGNOSIS — E03.9 HYPOTHYROIDISM, UNSPECIFIED TYPE: ICD-10-CM

## 2023-01-18 DIAGNOSIS — I11.9 HYPERTENSIVE HEART DISEASE WITHOUT HEART FAILURE: ICD-10-CM

## 2023-01-18 DIAGNOSIS — E87.6 HYPOKALEMIA: ICD-10-CM

## 2023-01-18 DIAGNOSIS — I10 ESSENTIAL HYPERTENSION: ICD-10-CM

## 2023-01-18 DIAGNOSIS — N18.32 STAGE 3B CHRONIC KIDNEY DISEASE (HCC): ICD-10-CM

## 2023-01-18 DIAGNOSIS — D63.1 ANEMIA IN CHRONIC KIDNEY DISEASE, UNSPECIFIED CKD STAGE: ICD-10-CM

## 2023-01-18 LAB
ALBUMIN SERPL-MCNC: 4.5 G/DL (ref 3.5–5.2)
ALP BLD-CCNC: 74 U/L (ref 35–104)
ALT SERPL-CCNC: 9 U/L (ref 5–33)
ANION GAP SERPL CALCULATED.3IONS-SCNC: 14 MMOL/L (ref 7–19)
AST SERPL-CCNC: 17 U/L (ref 5–32)
BILIRUB SERPL-MCNC: <0.2 MG/DL (ref 0.2–1.2)
BUN BLDV-MCNC: 26 MG/DL (ref 8–23)
CALCIUM SERPL-MCNC: 9.6 MG/DL (ref 8.2–9.6)
CHLORIDE BLD-SCNC: 101 MMOL/L (ref 98–111)
CO2: 25 MMOL/L (ref 22–29)
CREAT SERPL-MCNC: 1.5 MG/DL (ref 0.5–0.9)
GFR SERPL CREATININE-BSD FRML MDRD: 32 ML/MIN/{1.73_M2}
GLUCOSE BLD-MCNC: 88 MG/DL (ref 74–109)
POTASSIUM SERPL-SCNC: 4.2 MMOL/L (ref 3.5–5)
SODIUM BLD-SCNC: 140 MMOL/L (ref 136–145)
TOTAL PROTEIN: 6.9 G/DL (ref 6.6–8.7)

## 2023-01-18 RX ORDER — POLYETHYLENE GLYCOL 400 AND PROPYLENE GLYCOL 4; 3 MG/ML; MG/ML
1 SOLUTION/ DROPS OPHTHALMIC PRN
COMMUNITY

## 2023-01-18 RX ORDER — QUINAPRIL 40 MG/1
40 TABLET ORAL DAILY
Qty: 90 TABLET | Refills: 1 | Status: SHIPPED | OUTPATIENT
Start: 2023-01-18

## 2023-01-18 RX ORDER — LORATADINE 10 MG/1
10 TABLET ORAL DAILY
Qty: 90 TABLET | Refills: 1 | COMMUNITY
Start: 2023-01-18

## 2023-01-18 RX ORDER — QUINAPRIL 20 MG/1
40 TABLET ORAL DAILY
Qty: 90 TABLET | Refills: 1 | Status: SHIPPED | OUTPATIENT
Start: 2023-01-18 | End: 2023-01-18

## 2023-01-18 RX ORDER — BIMATOPROST 0.01 %
1 DROPS OPHTHALMIC (EYE) ONCE
COMMUNITY
Start: 2023-01-09

## 2023-01-18 RX ORDER — ALLOPURINOL 300 MG/1
TABLET ORAL
Qty: 90 TABLET | Refills: 0 | Status: SHIPPED | OUTPATIENT
Start: 2023-01-18

## 2023-01-18 RX ORDER — LOPERAMIDE HYDROCHLORIDE 2 MG/1
2 CAPSULE ORAL 2 TIMES DAILY PRN
Qty: 30 CAPSULE | Refills: 1 | COMMUNITY
Start: 2023-01-18 | End: 2023-01-28

## 2023-01-18 RX ORDER — CLONIDINE HYDROCHLORIDE 0.1 MG/1
0.1 TABLET ORAL DAILY PRN
Qty: 90 TABLET | Refills: 3 | Status: SHIPPED | OUTPATIENT
Start: 2023-01-18

## 2023-01-18 RX ORDER — LEVOTHYROXINE SODIUM 0.05 MG/1
TABLET ORAL
Qty: 90 TABLET | Refills: 3 | Status: SHIPPED | OUTPATIENT
Start: 2023-01-18

## 2023-01-18 ASSESSMENT — ENCOUNTER SYMPTOMS
CHOKING: 0
SHORTNESS OF BREATH: 0
NAUSEA: 0
STRIDOR: 0
WHEEZING: 0
BLOOD IN STOOL: 0
COLOR CHANGE: 0
TROUBLE SWALLOWING: 0
VOMITING: 0
COUGH: 0
ABDOMINAL DISTENTION: 0
SORE THROAT: 0
EYE DISCHARGE: 0
ABDOMINAL PAIN: 0
CONSTIPATION: 0
EYE ITCHING: 0
DIARRHEA: 0

## 2023-01-18 NOTE — PATIENT INSTRUCTIONS
Decline in physical condition;  plans to move to [facility]   Dementia  we have all her meds straight; and new H&P done    Hypertension;  quinapril 40 daily   3  hypothyroidism stable on current dose of meds   Gouty arthritis stable with allopurinol  6.   Hypokalemia rechecking labs today

## 2023-01-18 NOTE — ASSESSMENT & PLAN NOTE
We will change her to quinapril 40 daily and she can have a as needed clonidine pressure over 170/90

## 2023-01-18 NOTE — PROGRESS NOTES
[facility] PHYSICIAN SERVICES  [facility] INTERNAL MEDICINE  [address] [address]  [address] [address]  Dept: [phone]  Dept Fax: [phone]: [phone]    Paddy Duane (:  3/22/1927) is a 80 y.o. female,Established patient  with [place] , here for evaluation of the following chief complaint(s): Other (Discussing medications , and assisted living )      Paddy Duane is a 80 y.o. female who presents today for her medical conditions/complaints as noted below. Paddy Duane is c/colleen Other (Discussing medications , and assisted living )        HPI:     Chief Complaint   Patient presents with    Other     Discussing medications , and assisted living        HPI    Decline in physical condition;  they are arranging for her to go to [facility];  they are planning getting meds straight;  ; she gaurang have pill reminder;  she will have personal ;  they have found lots of meds in her house cleaning that she has not been taking   2. Dementia;  she doesn't remember to take meds and refuses to shower;  she cant cook or shop or drive;  she can feed herself  with minimal assist to dress;  she has mobility and safety issues with fall risk, several months ago she feel out of bed but none since;  ; ;    3.  Hypertension;  she is on quinapril 20 BID   (they have tons ) she had been taking 20 mg twice daily. The daughter would give her her a.m. dose and leave the p.m. dose and she has not been taking that her daughter thinks probably she has not had it for over a month because they found a stash of the pills. 4.  CKD  stable but we will get labs today;    5. Hypothyroidism stab. le woth levothyroxine;   6.  Gouty arthritis;  on maintenance allopurinol   7.   Hypokalemia;  check labs today;  (she is taking an OTC K+ supplement )     COUNSELING:  regarding move to [facility];  35 minutes correcting med list going down all the medications as they found at her home, talking with Fernanda cox about setting up her medications,  Past Medical History:   Diagnosis Date    Anemia     Arthritis due to gout     Chronic kidney disease     Eczema     Fibrocystic breast disease (FCBD)     Fibromyalgia     Hypertensive heart disease     Migraine     Mixed hyperlipidemia     Osteoarthritis     Skin cancer     Dr Chaves Guard, rt ant tibial region, 9/07, 1/16 Dr Farooq Presume      Past Surgical History:   Procedure Laterality Date    APPENDECTOMY      COLONOSCOPY      HYSTERECTOMY (CERVIX STATUS UNKNOWN)         Vitals 1/18/2023 12/15/2022 11/7/2022 4/19/2022 8/12/2021 9/26/9360   SYSTOLIC 706 573 [phone]   DIASTOLIC 90 64 68 85 78 80   Site - - - - - Left Upper Arm   Position - - - - - -   Cuff Size - - - - - -   Pulse 80 61 64 76 80 -   Temp 97.5 96.8 99 - - -   Resp - - - - - -   SpO2 95 95 96 95 95 -   Weight 114 lb 111 lb - 116 lb 117 lb -   Height - - - 5' 0\" 5' 0\" -   Body mass index - - - 22.65 kg/m2 22.85 kg/m2 -   Some recent data might be hidden       Family History   Problem Relation Age of Onset    Other Mother         thrombocytopenia    Other Father         skin cancer       Social History     Tobacco Use    Smoking status: Former    Smokeless tobacco: Never   Substance Use Topics    Alcohol use: Not on file      Current Outpatient Medications   Medication Sig Dispense Refill    loperamide (RA ANTI-DIARRHEAL) 2 MG capsule Take 1 capsule by mouth 2 times daily as needed for Diarrhea 30 capsule 1    loratadine (CLARITIN) 10 MG tablet Take 1 tablet by mouth daily 90 tablet 1    allopurinol (ZYLOPRIM) 300 MG tablet TAKE 1 TABLET DAILY (NEED APPOINTMENT FOR REFILLS) 90 tablet 0    cloNIDine (CATAPRES) 0.1 MG tablet Take 1 tablet by mouth daily as needed for High Blood Pressure 90 tablet 3    levothyroxine (SYNTHROID) 50 MCG tablet TAKE 1 TABLET DAILY (NEED AN APPOINTMENT) 90 tablet 3    quinapril (ACCUPRIL) 40 MG tablet Take 1 tablet by mouth daily 90 tablet 1    polyethyl glycol-propyl glycol 0.4-0.3 % (SYSTANE) 0.4-0.3 % ophthalmic solution 1 drop as needed for Dry Eyes      brimonidine-timolol (COMBIGAN) 0.2-0.5 % ophthalmic solution Daily      cycloSPORINE (RESTASIS) 0.05 % ophthalmic emulsion 1 drop 2 times daily      Multiple Vitamins-Minerals (THERAPEUTIC MULTIVITAMIN-MINERALS) tablet Take 1 tablet by mouth daily      Acetaminophen (TYLENOL ARTHRITIS PAIN PO) Take by mouth      LUMIGAN 0.01 % SOLN ophthalmic drops Place 1 drop into both eyes once Early evening       No current facility-administered medications for this visit.      Allergies   Allergen Reactions    Biaxin [Clarithromycin]     Vioxx [Rofecoxib]        Health Maintenance   Topic Date Due    Shingles vaccine (2 of 3) 10/28/2015    COVID-19 Vaccine (3 - Booster for [place] series) 05/08/2021    Annual Wellness Visit (AWV)  08/13/2022    Depression Screen  04/19/2023    DTaP/Tdap/Td vaccine (2 - Td or Tdap) 12/18/2024    Flu vaccine  Completed    Pneumococcal 65+ years Vaccine  Completed    Hepatitis A vaccine  Aged Out    Hib vaccine  Aged Out    Meningococcal (ACWY) vaccine  Aged Out       No results found for: LABA1C  No results found for: PSA, PSADIA  TSH   Date Value Ref Range Status   04/19/2022 1.850 0.270 - 4.200 uIU/mL Final   ]  Lab Results   Component Value Date     04/19/2022    K 4.7 04/19/2022     04/19/2022    CO2 26 04/19/2022    BUN 35 (H) 04/19/2022    CREATININE 1.5 (H) 04/19/2022    GLUCOSE 107 04/19/2022    CALCIUM 10.2 (H) 04/19/2022    PROT 7.1 04/19/2022    LABALBU 4.6 04/19/2022    BILITOT <0.2 04/19/2022    ALKPHOS 69 04/19/2022    AST 21 04/19/2022    ALT 13 04/19/2022    LABGLOM 32 (A) 04/19/2022    GFRAA 39 (L) 04/19/2022     No results found for: CHOL  No results found for: TRIG  Lab Results   Component Value Date    HDL 55 (L) 08/02/2021     Lab Results   Component Value Date    LDLCALC 139 08/02/2021     Lab Results   Component Value Date/Time     04/19/2022 03:26 PM    K 4.7 04/19/2022 03:26 PM     04/19/2022 03:26 PM    CO2 26 04/19/2022 03:26 PM    BUN 35 04/19/2022 03:26 PM    CREATININE 1.5 04/19/2022 03:26 PM    GLUCOSE 107 04/19/2022 03:26 PM    CALCIUM 10.2 04/19/2022 03:26 PM      Lab Results   Component Value Date    WBC 7.5 04/19/2022    HGB 12.5 04/19/2022    HCT 40.1 04/19/2022    .3 (H) 04/19/2022     04/19/2022    LYMPHOPCT 19.3 (L) 08/02/2021    RBC 4.00 (L) 04/19/2022    MCH 31.3 (H) 04/19/2022    MCHC 31.2 (L) 04/19/2022    RDW 13.8 04/19/2022     No results found for: VITD25  Labs reviewed from today   @assessment requiring independent historian daughter   @   Subjective:      Review of Systems   Constitutional:  Positive for fatigue. Negative for fever and unexpected weight change. HENT:  Negative for ear discharge, ear pain, mouth sores, sore throat and trouble swallowing. Eyes:  Negative for discharge, itching and visual disturbance. Respiratory:  Negative for cough, choking, shortness of breath, wheezing and stridor. Cardiovascular:  Negative for chest pain, palpitations and leg swelling. Gastrointestinal:  Negative for abdominal distention, abdominal pain, blood in stool, constipation, diarrhea, nausea and vomiting. Endocrine: Negative for cold intolerance, polydipsia and polyuria. Genitourinary:  Negative for difficulty urinating, dysuria, frequency and urgency. Musculoskeletal:  Positive for arthralgias. Negative for gait problem. Skin:  Negative for color change and rash. Allergic/Immunologic: Negative for food allergies and immunocompromised state. Neurological:  Negative for dizziness, tremors, syncope, speech difficulty, weakness and headaches. Hematological:  Negative for adenopathy. Does not bruise/bleed easily. Psychiatric/Behavioral:  Negative for confusion and hallucinations. Dementia     Objective:     Physical Exam  Constitutional:       General: She is not in acute distress. Appearance: She is well-developed.    HENT:      Head: Normocephalic and atraumatic. Eyes:      General: No scleral icterus. Right eye: No discharge. Left eye: No discharge. Pupils: Pupils are equal, round, and reactive to light. Neck:      Thyroid: No thyromegaly. Vascular: No JVD. Cardiovascular:      Rate and Rhythm: Normal rate and regular rhythm. Heart sounds: Normal heart sounds. No murmur heard. Pulmonary:      Effort: Pulmonary effort is normal. No respiratory distress. Breath sounds: Normal breath sounds. No wheezing or rales. Abdominal:      General: Bowel sounds are normal. There is no distension. Palpations: Abdomen is soft. There is no mass. Tenderness: There is no abdominal tenderness. There is no guarding or rebound. Musculoskeletal:         General: No tenderness. Normal range of motion. Cervical back: Normal range of motion and neck supple. Skin:     General: Skin is warm and dry. Findings: No erythema or rash. Neurological:      Mental Status: She is alert and oriented to person, place, and time. Cranial Nerves: No cranial nerve deficit. Coordination: Coordination normal.      Deep Tendon Reflexes: Reflexes are normal and symmetric. Reflexes normal.      Comments: She can actually carry on a good conversation;  agreeable and kind   Psychiatric:         Mood and Affect: Mood is not depressed. Behavior: Behavior normal.         Thought Content: Thought content normal.         Judgment: Judgment normal.     BP (!) 170/90   Pulse 80   Temp 97.5 °F (36.4 °C)   Wt 114 lb (51.7 kg)   SpO2 95%   BMI 22.26 kg/m²           Assessment:      Problem List       Hypokalemia - Primary    Relevant Orders    Comprehensive Metabolic Panel    Moderate vascular dementia without behavioral disturbance, psychotic disturbance, mood disturbance, or anxiety     That held off on giving her medications at this time she is mostly her memory.           Anemia in chronic kidney disease Relevant Medications    allopurinol (ZYLOPRIM) 300 MG tablet    cloNIDine (CATAPRES) 0.1 MG tablet    levothyroxine (SYNTHROID) 50 MCG tablet    quinapril (ACCUPRIL) 40 MG tablet    Arthritis due to gout    Relevant Medications    Acetaminophen (TYLENOL ARTHRITIS PAIN PO)    allopurinol (ZYLOPRIM) 300 MG tablet    cloNIDine (CATAPRES) 0.1 MG tablet    levothyroxine (SYNTHROID) 50 MCG tablet    quinapril (ACCUPRIL) 40 MG tablet    Chronic kidney disease    Relevant Medications    allopurinol (ZYLOPRIM) 300 MG tablet    cloNIDine (CATAPRES) 0.1 MG tablet    levothyroxine (SYNTHROID) 50 MCG tablet    quinapril (ACCUPRIL) 40 MG tablet    Essential hypertension     We will change her to quinapril 40 daily and she can have a as needed clonidine pressure over 170/90          Hypertensive heart disease    Relevant Medications    allopurinol (ZYLOPRIM) 300 MG tablet    cloNIDine (CATAPRES) 0.1 MG tablet    levothyroxine (SYNTHROID) 50 MCG tablet    quinapril (ACCUPRIL) 40 MG tablet    Hypothyroidism    Relevant Medications    allopurinol (ZYLOPRIM) 300 MG tablet    cloNIDine (CATAPRES) 0.1 MG tablet    levothyroxine (SYNTHROID) 50 MCG tablet    quinapril (ACCUPRIL) 40 MG tablet       Plan:        Patient given educational materials - see patient instructions. Discussed use, benefit, and side effects of prescribed medications. Allpatient questions answered. Pt voiced understanding. Reviewed health maintenance. Instructed to continue current medications, diet and exercise. Patient agreed with treatment plan. Follow up as directed.    MEDICATIONS:  Orders Placed This Encounter   Medications    loperamide (RA ANTI-DIARRHEAL) 2 MG capsule     Sig: Take 1 capsule by mouth 2 times daily as needed for Diarrhea     Dispense:  30 capsule     Refill:  1    loratadine (CLARITIN) 10 MG tablet     Sig: Take 1 tablet by mouth daily     Dispense:  90 tablet     Refill:  1    DISCONTD: quinapril (ACCUPRIL) 20 MG tablet     Sig: Take 2 tablets by mouth daily     Dispense:  90 tablet     Refill:  1    allopurinol (ZYLOPRIM) 300 MG tablet     Sig: TAKE 1 TABLET DAILY (NEED APPOINTMENT FOR REFILLS)     Dispense:  90 tablet     Refill:  0    cloNIDine (CATAPRES) 0.1 MG tablet     Sig: Take 1 tablet by mouth daily as needed for High Blood Pressure     Dispense:  90 tablet     Refill:  3    levothyroxine (SYNTHROID) 50 MCG tablet     Sig: TAKE 1 TABLET DAILY (NEED AN APPOINTMENT)     Dispense:  90 tablet     Refill:  3    quinapril (ACCUPRIL) 40 MG tablet     Sig: Take 1 tablet by mouth daily     Dispense:  90 tablet     Refill:  1     This is to replace the 20 BID;  so she only has to take one pill daily           ORDERS:  Orders Placed This Encounter   Procedures    Comprehensive Metabolic Panel         Follow-up:  Return for keep fu appt, have labs done prior to appt. PATIENT INSTRUCTIONS:  Patient Instructions    Decline in physical condition;  [facility]   Dementia  we have all her meds straight; and new H&P done    Hypertension;  quinapril 40 daily   3  hypothyroidism stable on current dose of meds   Gouty arthritis stable with allopurinol  6. Hypokalemia rechecking labs today  Electronically signed by KATERINA Lopez on 1/18/2023 at 10:18 AM    @    Larry/transcription disclaimer:  Much of this encounter note is electronic transcription/translation of spoken language to printed texts. The electronic translation of spoken language may be erroneous, or at times,nonsensical words or phrases may be inadvertently transcribed.   Although I have reviewed the note for such errors, some may still exist.

## 2023-01-23 ENCOUNTER — TELEPHONE (OUTPATIENT)
Dept: PRIMARY CARE CLINIC | Age: 88
End: 2023-01-23

## 2023-01-23 RX ORDER — PLANT STANOL ESTER 450 MG
550 TABLET ORAL EVERY OTHER DAY
COMMUNITY

## 2023-01-25 ENCOUNTER — OFFICE VISIT (OUTPATIENT)
Dept: URBAN - METROPOLITAN AREA CLINIC 41 | Facility: CLINIC | Age: 88
End: 2023-01-25
Payer: MEDICARE

## 2023-01-25 DIAGNOSIS — S05.11XA CONTUSION OF EYEBALL AND ORBITAL TISSUES, RIGHT EYE, INIT: ICD-10-CM

## 2023-01-25 DIAGNOSIS — H35.3211 EXDTVE AGE-REL MCLR DEGN, RIGHT EYE, WITH ACTV CHRDL NEOVAS: ICD-10-CM

## 2023-01-25 DIAGNOSIS — H35.372 PUCKERING OF MACULA, LEFT EYE: ICD-10-CM

## 2023-01-25 DIAGNOSIS — Z96.1 PRESENCE OF PSEUDOPHAKIA: Primary | ICD-10-CM

## 2023-01-25 DIAGNOSIS — H35.3122 NEXDTVE AGE-RELATED MCLR DEGN, LEFT EYE, INTERMED DRY STAGE: ICD-10-CM

## 2023-01-25 PROCEDURE — 92134 CPTRZ OPH DX IMG PST SGM RTA: CPT | Performed by: OPHTHALMOLOGY

## 2023-01-25 PROCEDURE — 92012 INTRM OPH EXAM EST PATIENT: CPT | Performed by: OPHTHALMOLOGY

## 2023-01-25 PROCEDURE — 67028 INJECTION EYE DRUG: CPT | Performed by: OPHTHALMOLOGY

## 2023-01-25 ASSESSMENT — INTRAOCULAR PRESSURE
OD: 16
OS: 14

## 2023-01-25 NOTE — IMPRESSION/PLAN
Impression: Nexdtve age-related mclr degn, left eye, intermed dry stage: H35.3122. OS. Status: Chronic. Plan: Patient notes mild distortion in vision. Exam and OCT demonstrate stable drusen and RPE changes confirming AMD is still dry. The patient was advised to continue AREDS 2 vitamin supplements; the risk of smoking was emphasized with the patient. The patient was also advised to continue to use an [address] to monitor the vision and to call immediately with any changes. Will closely monitor.

## 2023-01-25 NOTE — IMPRESSION/PLAN
Impression: Presence of pseudophakia: Z96. 1.OU Plan: Stable. Recent appt with Dr. Shauna Bianchi was stable.

## 2023-01-25 NOTE — IMPRESSION/PLAN
Impression: Exdtve age-rel mclr degn, right eye, with actv chrdl neovas: H35.3211 OD. Status: Symptomatic. --worse at ~5 wks
-s/p Avastin last 08/31/17
-s/p Eylea last 05/12/2022
-s/p Beovu last 12/31/2020
-s/p Vabysmo last 12/15/2022 Plan: Exam and OCT reveal PED with atrophy with improved SRF tapering from 6 wks to 4 wks. Recommend Vabysmo today and maintain next visit to 6-8 wks. R/B/A's discussed. Patient elects to proceed. Intravitreal Vabysmo injection performed into the right eye without complication.  

RTC: 6-8 weeks OCT OU; straight Vabysmo OD #1/3

## 2023-03-08 ENCOUNTER — PROCEDURE (OUTPATIENT)
Dept: URBAN - METROPOLITAN AREA CLINIC 41 | Facility: CLINIC | Age: 88
End: 2023-03-08
Payer: MEDICARE

## 2023-03-08 DIAGNOSIS — H35.3211 EXUDATIVE AGE-RELATED MACULAR DEGENERATION, RIGHT EYE, WITH ACTIVE CHOROIDAL NEOVASCULARIZATION: Primary | ICD-10-CM

## 2023-03-08 PROCEDURE — 67028 INJECTION EYE DRUG: CPT | Performed by: OPHTHALMOLOGY

## 2023-03-08 PROCEDURE — 92134 CPTRZ OPH DX IMG PST SGM RTA: CPT | Performed by: OPHTHALMOLOGY

## 2023-03-08 ASSESSMENT — INTRAOCULAR PRESSURE
OS: 15
OD: 16

## 2023-03-16 DIAGNOSIS — D63.1 ANEMIA IN CHRONIC KIDNEY DISEASE, UNSPECIFIED CKD STAGE: ICD-10-CM

## 2023-03-16 DIAGNOSIS — E03.9 HYPOTHYROIDISM, UNSPECIFIED TYPE: ICD-10-CM

## 2023-03-16 DIAGNOSIS — N18.9 ANEMIA IN CHRONIC KIDNEY DISEASE, UNSPECIFIED CKD STAGE: ICD-10-CM

## 2023-03-16 DIAGNOSIS — I11.9 HYPERTENSIVE HEART DISEASE WITHOUT HEART FAILURE: ICD-10-CM

## 2023-03-16 DIAGNOSIS — N18.9 CHRONIC KIDNEY DISEASE, UNSPECIFIED CKD STAGE: ICD-10-CM

## 2023-03-16 DIAGNOSIS — M10.9 ARTHRITIS DUE TO GOUT: ICD-10-CM

## 2023-03-16 RX ORDER — LORATADINE 10 MG/1
TABLET ORAL
Qty: 90 TABLET | Refills: 1 | Status: SHIPPED | OUTPATIENT
Start: 2023-03-16

## 2023-03-16 RX ORDER — ALLOPURINOL 300 MG/1
TABLET ORAL
Qty: 90 TABLET | Refills: 1 | Status: SHIPPED | OUTPATIENT
Start: 2023-03-16

## 2023-04-05 DIAGNOSIS — E03.9 HYPOTHYROIDISM, UNSPECIFIED TYPE: ICD-10-CM

## 2023-04-05 DIAGNOSIS — D63.1 ANEMIA IN CHRONIC KIDNEY DISEASE, UNSPECIFIED CKD STAGE: ICD-10-CM

## 2023-04-05 DIAGNOSIS — I11.9 HYPERTENSIVE HEART DISEASE WITHOUT HEART FAILURE: ICD-10-CM

## 2023-04-05 DIAGNOSIS — N18.9 ANEMIA IN CHRONIC KIDNEY DISEASE, UNSPECIFIED CKD STAGE: ICD-10-CM

## 2023-04-05 DIAGNOSIS — N18.9 CHRONIC KIDNEY DISEASE, UNSPECIFIED CKD STAGE: ICD-10-CM

## 2023-04-05 DIAGNOSIS — M10.9 ARTHRITIS DUE TO GOUT: ICD-10-CM

## 2023-04-05 RX ORDER — QUINAPRIL 20 MG/1
40 TABLET ORAL DAILY
Qty: 90 TABLET | Refills: 1 | Status: SHIPPED | OUTPATIENT
Start: 2023-04-05

## 2023-04-05 NOTE — TELEPHONE ENCOUNTER
Amairani Damon called to request a refill on her medication.       Last office visit : 1/18/2023   Next office visit : 4/20/2023     Requested Prescriptions     Pending Prescriptions Disp Refills    quinapril (ACCUPRIL) 20 MG tablet 90 tablet 1     Sig: Take 2 tablets by mouth daily          Jakob Vela

## 2023-04-19 ENCOUNTER — TELEPHONE (OUTPATIENT)
Dept: INTERNAL MEDICINE | Age: 88
End: 2023-04-19

## 2023-04-19 ENCOUNTER — PROCEDURE (OUTPATIENT)
Facility: LOCATION | Age: 88
End: 2023-04-19
Payer: MEDICARE

## 2023-04-19 DIAGNOSIS — H35.3211 EXUDATIVE AGE-RELATED MACULAR DEGENERATION, RIGHT EYE, WITH ACTIVE CHOROIDAL NEOVASCULARIZATION: Primary | ICD-10-CM

## 2023-04-19 PROCEDURE — 67028 INJECTION EYE DRUG: CPT | Performed by: OPHTHALMOLOGY

## 2023-04-19 PROCEDURE — 92134 CPTRZ OPH DX IMG PST SGM RTA: CPT | Performed by: OPHTHALMOLOGY

## 2023-04-19 RX ORDER — LISINOPRIL 40 MG/1
40 TABLET ORAL DAILY
Qty: 90 TABLET | Refills: 1 | Status: SHIPPED | OUTPATIENT
Start: 2023-04-19 | End: 2023-04-20 | Stop reason: SDUPTHER

## 2023-04-19 ASSESSMENT — INTRAOCULAR PRESSURE
OS: 12
OD: 13

## 2023-04-19 NOTE — TELEPHONE ENCOUNTER
DAUGHTER INFORMED AND WANTS TO DISCUSS FURTHER AT [facility] APPT TOMORROW

## 2023-04-19 NOTE — TELEPHONE ENCOUNTER
Sw pharmacy they stated that quinapril is out of stock they have tried all strengths and it is not available would like to know what else you would like her to try .

## 2023-04-20 ENCOUNTER — OFFICE VISIT (OUTPATIENT)
Dept: INTERNAL MEDICINE | Age: 88
End: 2023-04-20

## 2023-04-20 VITALS
HEART RATE: 78 BPM | WEIGHT: 118 LBS | DIASTOLIC BLOOD PRESSURE: 78 MMHG | OXYGEN SATURATION: 98 % | BODY MASS INDEX: 23.05 KG/M2 | SYSTOLIC BLOOD PRESSURE: 134 MMHG

## 2023-04-20 DIAGNOSIS — Z00.00 MEDICARE ANNUAL WELLNESS VISIT, SUBSEQUENT: Primary | ICD-10-CM

## 2023-04-20 DIAGNOSIS — N18.32 STAGE 3B CHRONIC KIDNEY DISEASE (HCC): ICD-10-CM

## 2023-04-20 DIAGNOSIS — F01.B0 MODERATE VASCULAR DEMENTIA WITHOUT BEHAVIORAL DISTURBANCE, PSYCHOTIC DISTURBANCE, MOOD DISTURBANCE, OR ANXIETY (HCC): ICD-10-CM

## 2023-04-20 DIAGNOSIS — I10 ESSENTIAL HYPERTENSION: ICD-10-CM

## 2023-04-20 DIAGNOSIS — M15.9 PRIMARY OSTEOARTHRITIS INVOLVING MULTIPLE JOINTS: ICD-10-CM

## 2023-04-20 DIAGNOSIS — M10.9 ARTHRITIS DUE TO GOUT: ICD-10-CM

## 2023-04-20 RX ORDER — LISINOPRIL 40 MG/1
40 TABLET ORAL DAILY
Qty: 90 TABLET | Refills: 1 | Status: SHIPPED | OUTPATIENT
Start: 2023-04-20

## 2023-04-20 RX ORDER — MENTHOL 40 MG/ML
GEL TOPICAL
Qty: 150 ML | Refills: 5 | Status: SHIPPED | OUTPATIENT
Start: 2023-04-20

## 2023-04-20 RX ORDER — ACETAMINOPHEN 500 MG
1000 TABLET ORAL EVERY 6 HOURS PRN
Qty: 360 TABLET | Refills: 1 | Status: SHIPPED | OUTPATIENT
Start: 2023-04-20

## 2023-04-20 ASSESSMENT — PATIENT HEALTH QUESTIONNAIRE - PHQ9
SUM OF ALL RESPONSES TO PHQ QUESTIONS 1-9: 0
SUM OF ALL RESPONSES TO PHQ QUESTIONS 1-9: 0
2. FEELING DOWN, DEPRESSED OR HOPELESS: 0
SUM OF ALL RESPONSES TO PHQ9 QUESTIONS 1 & 2: 0
SUM OF ALL RESPONSES TO PHQ QUESTIONS 1-9: 0
1. LITTLE INTEREST OR PLEASURE IN DOING THINGS: 0
SUM OF ALL RESPONSES TO PHQ QUESTIONS 1-9: 0

## 2023-04-20 ASSESSMENT — LIFESTYLE VARIABLES: HOW OFTEN DO YOU HAVE A DRINK CONTAINING ALCOHOL: MONTHLY OR LESS

## 2023-04-27 ENCOUNTER — HOSPITAL ENCOUNTER (EMERGENCY)
Facility: HOSPITAL | Age: 88
Discharge: HOME | DRG: 522 | End: 2023-04-28
Attending: EMERGENCY MEDICINE
Payer: MEDICARE

## 2023-04-27 ENCOUNTER — APPOINTMENT (OUTPATIENT)
Dept: CT IMAGING | Facility: HOSPITAL | Age: 88
DRG: 522 | End: 2023-04-27
Payer: MEDICARE

## 2023-04-27 ENCOUNTER — APPOINTMENT (OUTPATIENT)
Dept: GENERAL RADIOLOGY | Facility: HOSPITAL | Age: 88
DRG: 522 | End: 2023-04-27
Payer: MEDICARE

## 2023-04-27 DIAGNOSIS — S09.90XA INJURY OF HEAD, INITIAL ENCOUNTER: Primary | ICD-10-CM

## 2023-04-27 DIAGNOSIS — S40.011A CONTUSION OF RIGHT SHOULDER, INITIAL ENCOUNTER: ICD-10-CM

## 2023-04-27 DIAGNOSIS — S70.11XA CONTUSION OF RIGHT HIP AND THIGH, INITIAL ENCOUNTER: ICD-10-CM

## 2023-04-27 DIAGNOSIS — H05.231 PERIORBITAL HEMATOMA OF RIGHT EYE: ICD-10-CM

## 2023-04-27 DIAGNOSIS — S70.01XA CONTUSION OF RIGHT HIP AND THIGH, INITIAL ENCOUNTER: ICD-10-CM

## 2023-04-27 PROCEDURE — 99284 EMERGENCY DEPT VISIT MOD MDM: CPT

## 2023-04-27 PROCEDURE — 70450 CT HEAD/BRAIN W/O DYE: CPT

## 2023-04-27 PROCEDURE — 72125 CT NECK SPINE W/O DYE: CPT

## 2023-04-27 PROCEDURE — 73030 X-RAY EXAM OF SHOULDER: CPT

## 2023-04-27 PROCEDURE — 73502 X-RAY EXAM HIP UNI 2-3 VIEWS: CPT

## 2023-04-27 RX ORDER — TRAMADOL HYDROCHLORIDE 50 MG/1
25 TABLET ORAL ONCE
Status: COMPLETED | OUTPATIENT
Start: 2023-04-27 | End: 2023-04-27

## 2023-04-27 RX ADMIN — TRAMADOL HYDROCHLORIDE 25 MG: 50 TABLET, COATED ORAL at 23:29

## 2023-04-28 ENCOUNTER — TELEPHONE (OUTPATIENT)
Dept: PRIMARY CARE CLINIC | Age: 88
End: 2023-04-28

## 2023-04-28 VITALS
HEART RATE: 90 BPM | BODY MASS INDEX: 21.16 KG/M2 | WEIGHT: 115 LBS | RESPIRATION RATE: 16 BRPM | TEMPERATURE: 98 F | OXYGEN SATURATION: 96 % | SYSTOLIC BLOOD PRESSURE: 118 MMHG | DIASTOLIC BLOOD PRESSURE: 70 MMHG | HEIGHT: 62 IN

## 2023-04-28 LAB
GLUCOSE BLDC GLUCOMTR-MCNC: 162 MG/DL (ref 70–130)
QT INTERVAL: 378 MS
QTC INTERVAL: 454 MS

## 2023-04-28 PROCEDURE — 96374 THER/PROPH/DIAG INJ IV PUSH: CPT

## 2023-04-28 PROCEDURE — 93005 ELECTROCARDIOGRAM TRACING: CPT | Performed by: EMERGENCY MEDICINE

## 2023-04-28 PROCEDURE — 93010 ELECTROCARDIOGRAM REPORT: CPT | Performed by: HOSPITALIST

## 2023-04-28 PROCEDURE — 25010000002 ONDANSETRON PER 1 MG: Performed by: EMERGENCY MEDICINE

## 2023-04-28 PROCEDURE — 82948 REAGENT STRIP/BLOOD GLUCOSE: CPT

## 2023-04-28 RX ORDER — ONDANSETRON 2 MG/ML
4 INJECTION INTRAMUSCULAR; INTRAVENOUS ONCE
Status: COMPLETED | OUTPATIENT
Start: 2023-04-28 | End: 2023-04-28

## 2023-04-28 RX ADMIN — ONDANSETRON 4 MG: 2 INJECTION INTRAMUSCULAR; INTRAVENOUS at 00:59

## 2023-04-28 NOTE — ED NOTES
The following fall interventions were initiated:    [x] Patient and/or family given education   [x] Call light within reach and educated on how to use   [x] Bed rails up per protocol    [x] Bed locked and in the lowest position   [] Bed alarm set and on loudest setting   [] Fall wrist band applied   [] Non skid footwear applied   [x] Room free of clutter   [x] Patient items within reach   [x] Adequate lighting provided  [] Falls sign present    [x] Patient moved closer to nursing station   [] Restraints applied

## 2023-04-28 NOTE — ED PROVIDER NOTES
Subjective   History of Present Illness  89-year-old woman who presents from long-term nursing facility.  She ambulates with a walker but has been subjected to multiple falls.  She had a fall yesterday landing on her shoulder.  She fell again today reinjuring that right shoulder.  She also injured her right hip and struck her head yesterday as well.  There has been no loss consciousness with these episodes.  She has been able to ambulate on that extremity with the use of her walker.      Review of Systems   Musculoskeletal: Positive for gait problem and myalgias.   All other systems reviewed and are negative.      Past Medical History:   Diagnosis Date   • Actinic keratosis of scalp    • Anemia    • Arthritis    • Chronic kidney disease, stage III (moderate)    • Eczema    • Fibromyalgia    • Glaucoma     Right eye   • Hx of migraine headaches    • Hypertension    • Hypothyroidism    • Squamous cell carcinoma of scalp        Allergies   Allergen Reactions   • Biaxin [Clarithromycin] Unknown (See Comments)     PT cannot recall       Past Surgical History:   Procedure Laterality Date   • APPENDECTOMY     • CATARACT EXTRACTION     • ENDOSCOPY  06/07/2007    mild gastritis otherwise normal upper endoscopy   • HYSTERECTOMY     • SKIN GRAFT     • SKIN LESION EXCISION      cyst removal    • TONSILLECTOMY         Family History   Problem Relation Age of Onset   • Thrombocytopenia Mother    • Skin cancer Father        Social History     Socioeconomic History   • Marital status:    Tobacco Use   • Smoking status: Former   • Smokeless tobacco: Never   Substance and Sexual Activity   • Alcohol use: No     Comment: Occasional wine   • Drug use: No           Objective   Physical Exam  Vitals and nursing note reviewed.   Constitutional:       General: She is not in acute distress.     Appearance: She is not toxic-appearing.   HENT:      Head: Normocephalic.   Eyes:      Extraocular Movements: Extraocular movements intact.       Comments: Right subconjunctival hemorrhage.  Right periorbital hematoma.  Extraocular movements are intact.  Pupils equal and reactive   Neck:      Comments: C-collar applied  Pulmonary:      Effort: Pulmonary effort is normal.   Chest:      Chest wall: No tenderness.   Abdominal:      General: Abdomen is flat.      Palpations: Abdomen is soft.      Tenderness: There is no abdominal tenderness.   Musculoskeletal:         General: Tenderness present.      Cervical back: No tenderness.      Comments: Tenderness with range of motion right shoulder.  And right lateral hip.  Contusion noted to the right lateral hip as well   Skin:     General: Skin is warm.      Capillary Refill: Capillary refill takes less than 2 seconds.   Neurological:      General: No focal deficit present.      Mental Status: She is alert.         Procedures           ED Course                                           Medical Decision Making  Amount and/or Complexity of Data Reviewed  Radiology: ordered. Decision-making details documented in ED Course.      Patient was resting comfortably during her ER stay.  At time of discharge she was assisted in sitting at her bedside and noticed to become pale and diaphoretic.  Apparently had a vagal episode she never lost consciousness.  She was observed in the emergency department with return to baseline.  And discharged back to nursing facility    Final diagnoses:   Injury of head, initial encounter   Periorbital hematoma of right eye   Contusion of right shoulder, initial encounter   Contusion of right hip and thigh, initial encounter       ED Disposition  ED Disposition     ED Disposition   Discharge    Condition   Stable    Comment   --             Shelley Tidwell MD  [address] [place] [address] [address]  [facility] [id number] [phone]    Schedule an appointment as soon as possible for a visit       [facility]  [address] [place] [address]  [id number] [phone]    As needed         Medication List      No changes were made to your prescriptions during this visit.

## 2023-04-28 NOTE — ED TRIAGE NOTES
PATIENT PRESENTS VIA EMS FROM [facility] WITH COMPLAINTS OF MULTIPLE FALLS WITH MOST RECENT TONIGHT. EMS REPORTS THAT TONIGHT PATIENT FELL OVER HER WALKER AND LANDED ON HER RIGHT SHOULDER. PATIENT HAS COMPLAINTS OF RIGHT SHOULDER PAIN, RIGHT KNEE/THIGH PAIN, AND RIGHT SIDED FACIAL BRUISING AND PAIN, AND A SKIN TEAR TO THE RIGHT ELBOW.

## 2023-04-28 NOTE — TELEPHONE ENCOUNTER
The patient's daughter, Jimi Leung, called and said the patient fell last night at [facility] where she lives. The patient was taken to [facility] ED by ambulance. The patient is having lots of pain in her right shoulder and hip. Jimi Leung is asking if the patient can take Tylenol q 4 hours instead of q 6 hours until she is feeling better. Please call the patient's daughter at [phone].

## 2023-04-28 NOTE — TELEPHONE ENCOUNTER
I was seeing patient's granddaughter this morning. She told me about the fall I reviewed the patient's [facility] ER note and went over those results they look okay the x-rays do not show a fracture but if she keeps having pain we need to know her bones are thinner with her age and sometimes harder to see on x-ray.   It sounds like she is pretty weak debilitated they had ask about a wheelchair I can write for a light weight wheelchair we can send an order somewhere let us know where also ask if they would like for me to order home health if so I can put an order in for [facility] and we can proceed from there

## 2023-04-29 ENCOUNTER — APPOINTMENT (OUTPATIENT)
Dept: CT IMAGING | Facility: HOSPITAL | Age: 88
DRG: 522 | End: 2023-04-29
Payer: MEDICARE

## 2023-04-29 ENCOUNTER — HOSPITAL ENCOUNTER (INPATIENT)
Facility: HOSPITAL | Age: 88
LOS: 4 days | Discharge: NURSING, REHAB OR LONG-TERM CARE | DRG: 522 | End: 2023-05-03
Attending: EMERGENCY MEDICINE | Admitting: INTERNAL MEDICINE
Payer: MEDICARE

## 2023-04-29 ENCOUNTER — APPOINTMENT (OUTPATIENT)
Dept: GENERAL RADIOLOGY | Facility: HOSPITAL | Age: 88
DRG: 522 | End: 2023-04-29
Payer: MEDICARE

## 2023-04-29 DIAGNOSIS — S72.001A CLOSED DISPLACED FRACTURE OF RIGHT FEMORAL NECK: Primary | ICD-10-CM

## 2023-04-29 DIAGNOSIS — Z78.9 DECREASED ACTIVITIES OF DAILY LIVING (ADL): ICD-10-CM

## 2023-04-29 DIAGNOSIS — Z74.09 IMPAIRED FUNCTIONAL MOBILITY AND ACTIVITY TOLERANCE: ICD-10-CM

## 2023-04-29 LAB
ABO GROUP BLD: NORMAL
ALBUMIN SERPL-MCNC: 4 G/DL (ref 3.5–5.2)
ALBUMIN/GLOB SERPL: 1.3 G/DL
ALP SERPL-CCNC: 87 U/L (ref 39–117)
ALT SERPL W P-5'-P-CCNC: 14 U/L (ref 1–33)
ANION GAP SERPL CALCULATED.3IONS-SCNC: 13 MMOL/L (ref 5–15)
APTT PPP: 32.8 SECONDS (ref 24.1–35)
AST SERPL-CCNC: 24 U/L (ref 1–32)
BACTERIA UR QL AUTO: ABNORMAL /HPF
BASOPHILS # BLD AUTO: 0.11 10*3/MM3 (ref 0–0.2)
BASOPHILS NFR BLD AUTO: 0.8 % (ref 0–1.5)
BILIRUB SERPL-MCNC: 0.4 MG/DL (ref 0–1.2)
BILIRUB UR QL STRIP: NEGATIVE
BLD GP AB SCN SERPL QL: NEGATIVE
BUN SERPL-MCNC: 40 MG/DL (ref 8–23)
BUN/CREAT SERPL: 24.1 (ref 7–25)
CALCIUM SPEC-SCNC: 9.4 MG/DL (ref 8.2–9.6)
CHLORIDE SERPL-SCNC: 101 MMOL/L (ref 98–107)
CLARITY UR: CLEAR
CO2 SERPL-SCNC: 23 MMOL/L (ref 22–29)
COLOR UR: YELLOW
CREAT SERPL-MCNC: 1.66 MG/DL (ref 0.57–1)
DEPRECATED RDW RBC AUTO: 51 FL (ref 37–54)
EGFRCR SERPLBLD CKD-EPI 2021: 28.1 ML/MIN/1.73
EOSINOPHIL # BLD AUTO: 0.18 10*3/MM3 (ref 0–0.4)
EOSINOPHIL NFR BLD AUTO: 1.3 % (ref 0.3–6.2)
ERYTHROCYTE [DISTWIDTH] IN BLOOD BY AUTOMATED COUNT: 14.2 % (ref 12.3–15.4)
GLOBULIN UR ELPH-MCNC: 3.1 GM/DL
GLUCOSE SERPL-MCNC: 118 MG/DL (ref 65–99)
GLUCOSE UR STRIP-MCNC: NEGATIVE MG/DL
HCT VFR BLD AUTO: 36.3 % (ref 34–46.6)
HGB BLD-MCNC: 11.2 G/DL (ref 12–15.9)
HGB UR QL STRIP.AUTO: NEGATIVE
HYALINE CASTS UR QL AUTO: ABNORMAL /LPF
IMM GRANULOCYTES # BLD AUTO: 0.19 10*3/MM3 (ref 0–0.05)
IMM GRANULOCYTES NFR BLD AUTO: 1.3 % (ref 0–0.5)
INR PPP: 1 (ref 0.91–1.09)
KETONES UR QL STRIP: NEGATIVE
LEUKOCYTE ESTERASE UR QL STRIP.AUTO: NEGATIVE
LYMPHOCYTES # BLD AUTO: 1.4 10*3/MM3 (ref 0.7–3.1)
LYMPHOCYTES NFR BLD AUTO: 9.8 % (ref 19.6–45.3)
MCH RBC QN AUTO: 30.3 PG (ref 26.6–33)
MCHC RBC AUTO-ENTMCNC: 30.9 G/DL (ref 31.5–35.7)
MCV RBC AUTO: 98.1 FL (ref 79–97)
MONOCYTES # BLD AUTO: 1.27 10*3/MM3 (ref 0.1–0.9)
MONOCYTES NFR BLD AUTO: 8.9 % (ref 5–12)
NEUTROPHILS NFR BLD AUTO: 11.2 10*3/MM3 (ref 1.7–7)
NEUTROPHILS NFR BLD AUTO: 77.9 % (ref 42.7–76)
NITRITE UR QL STRIP: NEGATIVE
NRBC BLD AUTO-RTO: 0 /100 WBC (ref 0–0.2)
PH UR STRIP.AUTO: 5.5 [PH] (ref 5–8)
PLATELET # BLD AUTO: 281 10*3/MM3 (ref 140–450)
PMV BLD AUTO: 9.5 FL (ref 6–12)
POTASSIUM SERPL-SCNC: 4.8 MMOL/L (ref 3.5–5.2)
PROT SERPL-MCNC: 7.1 G/DL (ref 6–8.5)
PROT UR QL STRIP: ABNORMAL
PROTHROMBIN TIME: 13.3 SECONDS (ref 11.8–14.8)
RBC # BLD AUTO: 3.7 10*6/MM3 (ref 3.77–5.28)
RBC # UR STRIP: ABNORMAL /HPF
REF LAB TEST METHOD: ABNORMAL
RH BLD: POSITIVE
SODIUM SERPL-SCNC: 137 MMOL/L (ref 136–145)
SP GR UR STRIP: 1.02 (ref 1–1.03)
SQUAMOUS #/AREA URNS HPF: ABNORMAL /HPF
T&S EXPIRATION DATE: NORMAL
UROBILINOGEN UR QL STRIP: ABNORMAL
WBC # UR STRIP: ABNORMAL /HPF
WBC NRBC COR # BLD: 14.35 10*3/MM3 (ref 3.4–10.8)

## 2023-04-29 PROCEDURE — 86900 BLOOD TYPING SEROLOGIC ABO: CPT | Performed by: FAMILY MEDICINE

## 2023-04-29 PROCEDURE — 85730 THROMBOPLASTIN TIME PARTIAL: CPT | Performed by: FAMILY MEDICINE

## 2023-04-29 PROCEDURE — 80053 COMPREHEN METABOLIC PANEL: CPT | Performed by: FAMILY MEDICINE

## 2023-04-29 PROCEDURE — 72192 CT PELVIS W/O DYE: CPT

## 2023-04-29 PROCEDURE — 99284 EMERGENCY DEPT VISIT MOD MDM: CPT

## 2023-04-29 PROCEDURE — 85610 PROTHROMBIN TIME: CPT | Performed by: FAMILY MEDICINE

## 2023-04-29 PROCEDURE — 86901 BLOOD TYPING SEROLOGIC RH(D): CPT | Performed by: FAMILY MEDICINE

## 2023-04-29 PROCEDURE — 86850 RBC ANTIBODY SCREEN: CPT | Performed by: FAMILY MEDICINE

## 2023-04-29 PROCEDURE — 73502 X-RAY EXAM HIP UNI 2-3 VIEWS: CPT

## 2023-04-29 PROCEDURE — 85025 COMPLETE CBC W/AUTO DIFF WBC: CPT | Performed by: FAMILY MEDICINE

## 2023-04-29 PROCEDURE — 93005 ELECTROCARDIOGRAM TRACING: CPT | Performed by: EMERGENCY MEDICINE

## 2023-04-29 PROCEDURE — 25010000002 MORPHINE PER 10 MG: Performed by: FAMILY MEDICINE

## 2023-04-29 PROCEDURE — 93010 ELECTROCARDIOGRAM REPORT: CPT | Performed by: INTERNAL MEDICINE

## 2023-04-29 PROCEDURE — 25010000002 ONDANSETRON PER 1 MG: Performed by: FAMILY MEDICINE

## 2023-04-29 PROCEDURE — 81001 URINALYSIS AUTO W/SCOPE: CPT | Performed by: FAMILY MEDICINE

## 2023-04-29 PROCEDURE — 36415 COLL VENOUS BLD VENIPUNCTURE: CPT

## 2023-04-29 RX ORDER — SODIUM CHLORIDE 0.9 % (FLUSH) 0.9 %
10 SYRINGE (ML) INJECTION EVERY 12 HOURS SCHEDULED
Status: DISCONTINUED | OUTPATIENT
Start: 2023-04-29 | End: 2023-05-04 | Stop reason: HOSPADM

## 2023-04-29 RX ORDER — HYDROCODONE BITARTRATE AND ACETAMINOPHEN 5; 325 MG/1; MG/1
1 TABLET ORAL EVERY 4 HOURS PRN
Status: DISCONTINUED | OUTPATIENT
Start: 2023-04-29 | End: 2023-05-04 | Stop reason: HOSPADM

## 2023-04-29 RX ORDER — SODIUM CHLORIDE, SODIUM LACTATE, POTASSIUM CHLORIDE, CALCIUM CHLORIDE 600; 310; 30; 20 MG/100ML; MG/100ML; MG/100ML; MG/100ML
50 INJECTION, SOLUTION INTRAVENOUS CONTINUOUS
Status: DISCONTINUED | OUTPATIENT
Start: 2023-04-29 | End: 2023-05-03

## 2023-04-29 RX ORDER — L.ACID,PARA/B.BIFIDUM/S.THERM 8B CELL
1 CAPSULE ORAL DAILY
Status: DISCONTINUED | OUTPATIENT
Start: 2023-04-29 | End: 2023-05-01

## 2023-04-29 RX ORDER — BRIMONIDINE TARTRATE 2 MG/ML
1 SOLUTION/ DROPS OPHTHALMIC 2 TIMES DAILY
Status: DISCONTINUED | OUTPATIENT
Start: 2023-04-29 | End: 2023-05-04 | Stop reason: HOSPADM

## 2023-04-29 RX ORDER — LEVOTHYROXINE SODIUM 0.05 MG/1
50 TABLET ORAL
Status: DISCONTINUED | OUTPATIENT
Start: 2023-04-30 | End: 2023-05-04 | Stop reason: HOSPADM

## 2023-04-29 RX ORDER — MORPHINE SULFATE 2 MG/ML
1 INJECTION, SOLUTION INTRAMUSCULAR; INTRAVENOUS EVERY 4 HOURS PRN
Status: DISCONTINUED | OUTPATIENT
Start: 2023-04-29 | End: 2023-05-03

## 2023-04-29 RX ORDER — IPRATROPIUM BROMIDE AND ALBUTEROL SULFATE 2.5; .5 MG/3ML; MG/3ML
3 SOLUTION RESPIRATORY (INHALATION) EVERY 6 HOURS PRN
Status: DISCONTINUED | OUTPATIENT
Start: 2023-04-29 | End: 2023-05-04 | Stop reason: HOSPADM

## 2023-04-29 RX ORDER — CYCLOSPORINE 0.5 MG/ML
1 EMULSION OPHTHALMIC 2 TIMES DAILY
Status: DISCONTINUED | OUTPATIENT
Start: 2023-04-29 | End: 2023-05-04 | Stop reason: HOSPADM

## 2023-04-29 RX ORDER — PANTOPRAZOLE SODIUM 40 MG/10ML
40 INJECTION, POWDER, LYOPHILIZED, FOR SOLUTION INTRAVENOUS DAILY
Status: DISCONTINUED | OUTPATIENT
Start: 2023-04-29 | End: 2023-05-02

## 2023-04-29 RX ORDER — LATANOPROST 50 UG/ML
1 SOLUTION/ DROPS OPHTHALMIC NIGHTLY
Status: DISCONTINUED | OUTPATIENT
Start: 2023-04-29 | End: 2023-05-04 | Stop reason: HOSPADM

## 2023-04-29 RX ORDER — SODIUM CHLORIDE 9 MG/ML
40 INJECTION, SOLUTION INTRAVENOUS AS NEEDED
Status: DISCONTINUED | OUTPATIENT
Start: 2023-04-29 | End: 2023-05-04 | Stop reason: HOSPADM

## 2023-04-29 RX ORDER — ACETAMINOPHEN 500 MG
1000 TABLET ORAL ONCE
Status: COMPLETED | OUTPATIENT
Start: 2023-04-29 | End: 2023-04-29

## 2023-04-29 RX ORDER — ONDANSETRON 2 MG/ML
4 INJECTION INTRAMUSCULAR; INTRAVENOUS EVERY 6 HOURS PRN
Status: DISCONTINUED | OUTPATIENT
Start: 2023-04-29 | End: 2023-05-04 | Stop reason: HOSPADM

## 2023-04-29 RX ORDER — SODIUM CHLORIDE 0.9 % (FLUSH) 0.9 %
10 SYRINGE (ML) INJECTION AS NEEDED
Status: DISCONTINUED | OUTPATIENT
Start: 2023-04-29 | End: 2023-05-04 | Stop reason: HOSPADM

## 2023-04-29 RX ORDER — LISINOPRIL 10 MG/1
10 TABLET ORAL
Status: DISCONTINUED | OUTPATIENT
Start: 2023-04-29 | End: 2023-05-04 | Stop reason: HOSPADM

## 2023-04-29 RX ORDER — ALLOPURINOL 300 MG/1
300 TABLET ORAL DAILY
Status: DISCONTINUED | OUTPATIENT
Start: 2023-04-29 | End: 2023-05-04 | Stop reason: HOSPADM

## 2023-04-29 RX ORDER — TIMOLOL MALEATE 5 MG/ML
1 SOLUTION/ DROPS OPHTHALMIC 2 TIMES DAILY
Status: DISCONTINUED | OUTPATIENT
Start: 2023-04-29 | End: 2023-05-04 | Stop reason: HOSPADM

## 2023-04-29 RX ADMIN — Medication 10 ML: at 21:56

## 2023-04-29 RX ADMIN — MORPHINE SULFATE 1 MG: 2 INJECTION, SOLUTION INTRAMUSCULAR; INTRAVENOUS at 14:51

## 2023-04-29 RX ADMIN — LISINOPRIL 10 MG: 10 TABLET ORAL at 17:16

## 2023-04-29 RX ADMIN — ACETAMINOPHEN 1000 MG: 500 TABLET, FILM COATED ORAL at 10:16

## 2023-04-29 RX ADMIN — CYCLOSPORINE 1 DROP: 0.5 EMULSION OPHTHALMIC at 21:56

## 2023-04-29 RX ADMIN — MORPHINE SULFATE 1 MG: 2 INJECTION, SOLUTION INTRAMUSCULAR; INTRAVENOUS at 20:49

## 2023-04-29 RX ADMIN — PANTOPRAZOLE SODIUM 40 MG: 40 INJECTION, POWDER, FOR SOLUTION INTRAVENOUS at 17:11

## 2023-04-29 RX ADMIN — ONDANSETRON 4 MG: 2 INJECTION INTRAMUSCULAR; INTRAVENOUS at 20:50

## 2023-04-29 RX ADMIN — LATANOPROST 1 DROP: 50 SOLUTION OPHTHALMIC at 20:49

## 2023-04-29 RX ADMIN — SODIUM CHLORIDE, POTASSIUM CHLORIDE, SODIUM LACTATE AND CALCIUM CHLORIDE 75 ML/HR: 600; 310; 30; 20 INJECTION, SOLUTION INTRAVENOUS at 14:51

## 2023-04-29 RX ADMIN — ALLOPURINOL 300 MG: 300 TABLET ORAL at 17:16

## 2023-04-29 RX ADMIN — Medication 1 CAPSULE: at 17:16

## 2023-04-29 RX ADMIN — TIMOLOL MALEATE 1 DROP: 5 SOLUTION/ DROPS OPHTHALMIC at 20:49

## 2023-04-29 RX ADMIN — BRIMONIDINE TARTRATE 1 DROP: 2 SOLUTION/ DROPS OPHTHALMIC at 20:49

## 2023-04-29 NOTE — ED PROVIDER NOTES
"EMERGENCY DEPARTMENT ATTENDING NOTE    Patient Name: Kamille Saul    Chief Complaint   Patient presents with   • R Hip Pain       PATIENT PRESENTATION:  Kamille Saul is a 89 y.o. female with PMH significant for arthritis, hypertension, squamous cell carcinoma, glaucoma, hypothyroidism, CKD who presents to the ED with worsening right hip pain after a fall on Thursday.  Patient was evaluated at the time of the emergency department had a CT head, CT spine, x-ray of the pelvis and shoulder.  Patient has soft tissue swelling ecchymosis over the right orbit but denies any changes in pain or symptoms in that region.  Patient was walking with a walker until last night when she was unable to walk back to her room with family due to the pain in her right hip.  She has been taking Tylenol for pain and last dose was 11 last night.  Patient does not like taking narcotics due to them making her feel woozy.  Patient denies any numbness tingling in her leg.  No new falls or trauma      PHYSICAL EXAM:   VS: /68 (BP Location: Left arm, Patient Position: Lying)   Pulse 74   Temp 98.2 °F (36.8 °C) (Oral)   Resp 18   Ht 152.4 cm (60\")   Wt 52.2 kg (115 lb)   SpO2 94%   BMI 22.46 kg/m²   GENERAL: well-nourished, well-developed, awake, alert, no acute distress, nontoxic appearing, comfortable  EYES: PERRL, sclerae anicteric, extraocular movements grossly intact, symmetric lids  EARS, NOSE, MOUTH, THROAT: atraumatic external nose and ears, moist mucous membranes  NECK: symmetric, trachea midline  RESPIRATORY: unlabored respiratory effort, clear to auscultation bilaterally, good air movement  CARDIOVASCULAR: no murmurs, peripheral pulses 2+ and equal in all extremities  GI: soft, nontender, nondistended  MUSCULOSKELETAL/EXTREMITIES: extremities without obvious deformity, ecchymosis overlying right hip, focal right hip tenderness  SKIN: warm and dry with no obvious rashes  NEUROLOGIC: moving all 4 extremities symmetrically, CN " II-XII grossly intact  PSYCHIATRIC: alert, pleasant and cooperative. Appropriate mood and affect.      MEDICAL DECISION MAKING:    Kamille Saul is a 89 y.o. female who presented to the ED with continued and worsening right hip pain after a fall on Thursday    Procedures    Differential Diagnosis Considered: Femoral neck fracture, femur shaft fracture    Labs Ordered:  Labs Reviewed - No data to display     Imaging Ordered:   XR Hip With or Without Pelvis 2 - 3 View Right   Final Result   1.. Impacted fracture the of right femoral neck.   This report was finalized on 04/29/2023 12:28 by Dr. Ludin Singer MD.      CT Pelvis Without Contrast   Final Result   1.. Mildly impacted and displaced fracture of the right femoral neck   with overlying soft tissue stranding and hematoma. The femoral head   remains concentric and well located within the acetabulum. No additional   fractures are present.   This report was finalized on 04/29/2023 10:04 by Dr. Ludin Singer MD.          Internal chart review:   Past Medical History:   Diagnosis Date   • Actinic keratosis of scalp    • Anemia    • Arthritis    • Chronic kidney disease, stage III (moderate)    • Eczema    • Fibromyalgia    • Glaucoma     Right eye   • Hx of migraine headaches    • Hypertension    • Hypothyroidism    • Squamous cell carcinoma of scalp        Past Surgical History:   Procedure Laterality Date   • APPENDECTOMY     • CATARACT EXTRACTION     • ENDOSCOPY  06/07/2007    mild gastritis otherwise normal upper endoscopy   • HYSTERECTOMY     • SKIN GRAFT     • SKIN LESION EXCISION      cyst removal    • TONSILLECTOMY         Allergies   Allergen Reactions   • Biaxin [Clarithromycin] Unknown (See Comments)     PT cannot recall       No current facility-administered medications for this encounter.    Current Outpatient Medications:   •  allopurinol (ZYLOPRIM) 300 MG tablet, Take 300 mg by mouth Daily., Disp: , Rfl:   •  brimonidine-timolol (COMBIGAN)  0.2-0.5 % ophthalmic solution, Administer 1 drop to both eyes Every 12 (Twelve) Hours., Disp: , Rfl:   •  cycloSPORINE (RESTASIS) 0.05 % ophthalmic emulsion, Administer 1 drop to both eyes 2 (Two) Times a Day., Disp: , Rfl:   •  lactobacillus acidophilus (RISAQUAD) capsule capsule, Take 1 capsule by mouth Daily., Disp: 30 capsule, Rfl: 2  •  lisinopril (PRINIVIL,ZESTRIL) 10 MG tablet, Take 1 tablet by mouth Daily., Disp: 30 tablet, Rfl: 2  •  LUMIGAN 0.01 % ophthalmic drops, Administer 1 drop to the right eye Every Night., Disp: , Rfl:   •  ondansetron (ZOFRAN) 4 MG tablet, Take 4 mg by mouth Every 8 (Eight) Hours As Needed for Nausea or Vomiting., Disp: , Rfl:   •  pantoprazole (PROTONIX) 40 MG EC tablet, Take 1 tablet by mouth Daily., Disp: 30 tablet, Rfl: 0  •  Potassium Gluconate 550 MG tablet, Take 1 tablet by mouth Daily., Disp: , Rfl:   •  SYNTHROID 50 MCG tablet, Take 50 mcg by mouth Daily., Disp: , Rfl:     External documents reviewed: Reviewed previous imaging, x-ray with lucency but no obvious fracture on day of injury.  CT head showed soft tissue swelling otherwise no acute findings    My EKG interpretation: EKG normal sinus rhythm rate of 77 aVL T wave inversion unchanged from previously 2 days ago.    My imaging interpretation: CT with femoral neck fracture    Discussed with: Dr. Seo, recommends hospital admission, evaluation prior to surgery and possible surgical fixation this evening.  Patient and patient's daughter is aware of the plan    Discussed with Dr. Salazar and Dr. Bennett to admit.    ED Course and Re-evaluation: Patient remained pain control with Tylenol.  Admitted for surgery.    ED Diagnosis:    Diagnosis Plan   1. Closed displaced fracture of right femoral neck              Disposition: admit to medicine      Signed:  Hebert Cox MD  Emergency Medicine Physician    Please note that portions of this note were completed with a voice recognition program.      Hebert Cox,  MD  04/29/23 9977

## 2023-04-29 NOTE — PLAN OF CARE
Problem: Skin Injury Risk Increased  Goal: Skin Health and Integrity  4/29/2023 1553 by Viri Ogden, RN  Outcome: Ongoing, Progressing   Goal Outcome Evaluation:  Plan of Care Reviewed With: patient, family        Progress: no change  Outcome Evaluation: Pt A&Ox4. Forgetful. Bedrest. Owens. NPO for possible sx. IV pain meds given. IVF started per orders. Family at BS. Call light in reach. Safety maintained. Will cont to monitor.

## 2023-04-29 NOTE — H&P
[facility] Medicine Services  HISTORY AND PHYSICAL    Date of Admission: 4/29/2023  Primary Care Physician: Shelley Tidwell MD    Subjective   Primary Historian: Patient and family    Chief Complaint: Right hip fracture/falling    History of Present Illness  Patient is a 89-year-old  female presented to ER complaining of right hip pain from a fall Thursday.  Patient was evaluated then with x-ray of the pelvic, radiology report stated no definitive fracture then.  Patient was then sent home, continued pain and discomfort.  Patient returned back to the ER today to be evaluated, CT scan shows mild impact and displaced fracture of the right femoral neck with overlying soft tissue stranding and hematoma.    Patient has a past medical history of anemia, arthritis, stage III renal failure, eczema, fibromyalgia, glucoma of the right eye, migraine headaches, hypertension, hypothyroidism, squamous cell cancer of the scalp.     Review of Systems   Constitutional: Positive for activity change, appetite change and fatigue. Negative for chills and fever.   HENT: Negative for hearing loss, nosebleeds, tinnitus and trouble swallowing.    Eyes: Negative for visual disturbance.   Respiratory: Negative for cough, chest tightness, shortness of breath and wheezing.    Cardiovascular: Negative for chest pain, palpitations and leg swelling.   Gastrointestinal: Negative for abdominal distention, abdominal pain, blood in stool, constipation, diarrhea, nausea and vomiting.   Endocrine: Negative for cold intolerance, heat intolerance, polydipsia, polyphagia and polyuria.   Genitourinary: Negative for decreased urine volume, difficulty urinating, dysuria, flank pain, frequency and hematuria.   Musculoskeletal: Positive for arthralgias, gait problem and myalgias. Negative for joint swelling.        Right hip pain   Skin: Negative for rash.   Allergic/Immunologic: Negative for immunocompromised state.    Neurological: Positive for weakness. Negative for dizziness, syncope, light-headedness and headaches.   Hematological: Negative for adenopathy. Does not bruise/bleed easily.   Psychiatric/Behavioral: Negative for confusion and sleep disturbance. The patient is not nervous/anxious.         Otherwise complete ROS reviewed and negative except as mentioned in the HPI.    Past Medical History:   Past Medical History:   Diagnosis Date   • Actinic keratosis of scalp    • Anemia    • Arthritis    • Chronic kidney disease, stage III (moderate)    • Eczema    • Fibromyalgia    • Glaucoma     Right eye   • Hx of migraine headaches    • Hypertension    • Hypothyroidism    • Squamous cell carcinoma of scalp      Past Surgical History:  Past Surgical History:   Procedure Laterality Date   • APPENDECTOMY     • CATARACT EXTRACTION     • ENDOSCOPY  06/07/2007    mild gastritis otherwise normal upper endoscopy   • HYSTERECTOMY     • SKIN GRAFT     • SKIN LESION EXCISION      cyst removal    • TONSILLECTOMY       Social History:  reports that she has quit smoking. She has never used smokeless tobacco. She reports that she does not drink alcohol and does not use drugs.    Family History: family history includes Skin cancer in her father; Thrombocytopenia in her mother.       Allergies:  Allergies   Allergen Reactions   • Biaxin [Clarithromycin] Unknown (See Comments)     PT cannot recall       Medications:  Prior to Admission medications    Medication Sig Start Date End Date Taking? Authorizing Provider   allopurinol (ZYLOPRIM) 300 MG tablet Take 300 mg by mouth Daily. 12/11/16   Karen Harrington MD   brimonidine-timolol (COMBIGAN) 0.2-0.5 % ophthalmic solution Administer 1 drop to both eyes Every 12 (Twelve) Hours.    Karen Harrington MD   cycloSPORINE (RESTASIS) 0.05 % ophthalmic emulsion Administer 1 drop to both eyes 2 (Two) Times a Day.    Karen Harrington MD   lactobacillus acidophilus (RISAQUAD) capsule capsule  "Take 1 capsule by mouth Daily. 6/30/19   Jun Bennett MD   lisinopril (PRINIVIL,ZESTRIL) 10 MG tablet Take 1 tablet by mouth Daily. 6/30/19   Jun Bennett MD   LUMIGAN 0.01 % ophthalmic drops Administer 1 drop to the right eye Every Night. 12/31/16   Karen Harrington MD   ondansetron (ZOFRAN) 4 MG tablet Take 4 mg by mouth Every 8 (Eight) Hours As Needed for Nausea or Vomiting.    Karen Harrington MD   pantoprazole (PROTONIX) 40 MG EC tablet Take 1 tablet by mouth Daily. 3/5/19   Cat Galicia APRN   Potassium Gluconate 550 MG tablet Take 1 tablet by mouth Daily.    Karen Harrington MD   SYNTHROID 50 MCG tablet Take 50 mcg by mouth Daily. 10/27/16   Karen Harrington MD I have utilized all available immediate resources to obtain, update, or review the patient's current medications (including all prescriptions, over-the-counter products, herbals, cannabis/cannabidiol products, and vitamin/mineral/dietary (nutritional) supplements).    Objective     Vital Signs: /68 (BP Location: Left arm, Patient Position: Lying)   Pulse 74   Temp 98.2 °F (36.8 °C) (Oral)   Resp 18   Ht 152.4 cm (60\")   Wt 52.2 kg (115 lb)   SpO2 94%   BMI 22.46 kg/m²   Physical Exam  Vitals and nursing note reviewed.   Constitutional:       Comments: Advanced age .   HENT:      Head: Normocephalic and atraumatic.   Eyes:      Conjunctiva/sclera: Conjunctivae normal.      Pupils: Pupils are equal, round, and reactive to light.   Neck:      Vascular: No JVD.   Cardiovascular:      Rate and Rhythm: Normal rate and regular rhythm.      Heart sounds: Normal heart sounds.   Pulmonary:      Effort: No respiratory distress.      Breath sounds: No wheezing or rales.      Comments: Diminished breath sound bilateral, on room air, clear.  Chest:      Chest wall: No tenderness.   Abdominal:      General: Bowel sounds are normal. There is no distension.      Palpations: Abdomen is soft.      Tenderness: There is no " abdominal tenderness.   Musculoskeletal:         General: No tenderness or deformity.      Cervical back: Neck supple.   Skin:     General: Skin is warm and dry.      Capillary Refill: Capillary refill takes 2 to 3 seconds.      Findings: No rash.   Neurological:      Mental Status: She is alert and oriented to person, place, and time.      Cranial Nerves: No cranial nerve deficit.      Motor: Weakness present. No abnormal muscle tone.      Coordination: Coordination abnormal.      Gait: Gait abnormal.      Deep Tendon Reflexes: Reflexes normal.      Comments: Right hip pain.  Right hip fracture   Psychiatric:         Mood and Affect: Mood normal.         Behavior: Behavior normal.         Thought Content: Thought content normal.         Judgment: Judgment normal.            Results Reviewed:  Lab Results (last 24 hours)     ** No results found for the last 24 hours. **        Imaging Results (Last 24 Hours)     Procedure Component Value Units Date/Time    XR Hip With or Without Pelvis 2 - 3 View Right [[id number]] Collected: 04/29/23 1226     Updated: 04/29/23 1231    Narrative:      EXAMINATION: AP pelvis and two-view right hip 04/29/2023     HISTORY: Fracture.     FINDINGS: AP radiograph the pelvis as well as AP and crosstable lateral  view the right hip demonstrate impacted fracture of the femoral neck  with varus deformity. The femoral head is concentric and well located  within the acetabulum. There is overlying soft tissue swelling/hematoma.       Impression:      1.. Impacted fracture the of right femoral neck.  This report was finalized on 04/29/2023 12:28 by Dr. Ludin Singer MD.    CT Pelvis Without Contrast [[id number]] Collected: 04/29/23 0958     Updated: 04/29/23 1007    Narrative:      EXAMINATION: CT pelvis without contrast 04/29/2023     HISTORY: Fracture suspected. Right hip pain after fall.     FINDINGS: Multiple contiguous axial images are obtained through the bony  pelvis per protocol without  intravenous contrast-enhancement with  reformatted images obtained in the sagittal and coronal projections from  the original data set.     There is osteopenia of the bones. There is anterolisthesis of L5 on S1  likely representing subluxation at the level of facets. There is mild  listhesis of L4 on L5 as well. There is associated central and foraminal  stenosis at these 2 lumbar disc levels.     There is a mildly impacted and displaced right femoral neck fracture.  The right femoral head remains concentric and well located within the  acetabulum. No additional fractures are identified. The SI joints and  pubic symphysis are intact with no evidence of diastases. There is  overlying soft tissue swelling and hematoma overlying the right hip.  This includes a discrete 5 x 2.5 cm hematoma.       Impression:      1.. Mildly impacted and displaced fracture of the right femoral neck  with overlying soft tissue stranding and hematoma. The femoral head  remains concentric and well located within the acetabulum. No additional  fractures are present.  This report was finalized on 04/29/2023 10:04 by Dr. Ludin Singer MD.        I have personally reviewed and interpreted the radiology studies and ECG obtained at time of admission.     Assessment / Plan   Assessment:   Active Hospital Problems    Diagnosis    • **Closed displaced fracture of right femoral neck    • Anemia    • Stage 3 chronic kidney disease (CMS/HCC), declining function    • Hypothyroidism (acquired)        Treatment Plan  The patient will be admitted to my service here at [facility].     Right hip fracture.  Orthopedic consult.  Plan for surgery today.  Patient will remain n.p.o. for now.  Norco as needed.  Morphine as needed.  CT scan-Mildly impacted and displaced fracture of the right femoral neck  with overlying soft tissue stranding and hematoma. The femoral head  remains concentric and well located within the acetabulum. No  additional  fractures are present.     Chronic stage III renal failure.  Creatinine is 1.5.  IV hydration.  Laboratory 4/19/22 reviewed.    Hypertension/hyperlipidemia.  Lisinopril.    Hypothyroidism.  Synthroid.  TSH in AM.    Gout.  Allopurinol.    Reflux.  Protonix IV.  Zofran as needed.    Nutrition . Probiotic.  N.p.o. for now.    Medical Decision Making  Number and Complexity of problems: Right hip fracture/hypertension/hypothyroidism/reflux  Differential Diagnosis: None.    Conditions and Status  Right hip fracture.     MDM Data  External documents reviewed: Previous notes and lab  Cardiac tracing (EKG, telemetry) interpretation: Sinus.  Radiology interpretation: CT scan  Labs reviewed: Laboratory  Any tests that were considered but not ordered: Labs in a.m.     Decision rules/scores evaluated (example [id number], Wells, etc): None     Discussed with: Patient and daughter     Care Planning  Shared decision making: Patient and daughter and ER physician.  Code status and discussions: DNR . DNI.    Disposition  Social Determinants of Health that impact treatment or disposition: Unknown.  Estimated length of stay is 2 to 5 days.     I confirmed that the patient's advanced care plan is present, code status is documented, and a surrogate decision maker is listed in the patient's medical record.     The patient's surrogate decision maker is daughterCeci    The patient was seen and examined by me on 4/29/2023 at 1315..    Electronically signed by Jun Bennett MD, 04/29/23, 13:34 CDT.

## 2023-04-29 NOTE — PROGRESS NOTES
Assessment tool to be used for patients with existing breathing treatments ordered by hospitalist                               Respiratory Therapist Driven Protocol - RT to Assess and Treat Algorithm    Item 0 Points 1 Point 2 Points 3 Points 4 Points Subtotal   Mental Status Alert, orientated, cooperative Lethargic, follows commands Confused, not following commands Obtunded or Somnolent Comatose 1   Respiratory Pattern Regular RR  8-16 breaths/minute Increased RR  18-25 breaths/minute Dyspnea on exertion, irregular RR  26-30/minute Shortness of breath,  RR 31-35/minute Accessory muscle use, severe SOB  RR > 35/minute 0   Breath Sounds Clear Decreased unilaterally Decreased bilaterally Basilar crackles Wheezing and/or rhonchi 0   Cough Strong, spontaneous non-productive Strong productive Weak, non-productive Weak productive or weak with rhonchi Absent or may require suctioning 0   Pulmonary Status Nonsmoker or no previous history > 1 year quit < 1 PPD  < 1 year quit >  or = 1 PPD Diagnosed pulmonary disease (severe or chronic) Severe or chronic pulmonary disease with exacerbation 1   Surgical Status None General surgery (non-abdominal or non-thoracic) Lower abdominal Thoracic or upper abdominal Thoracic with pulmonary disease 1   Chest X-ray Clear Chronic changes Infiltrates, atelectasis or pleural effusion Infiltrates > 1 lobe Diffuse infiltrates and atelectasis and/or effusions 0   Activity level Ambulatory Ambulatory with assistance Non-ambulatory Paraplegic Quadriplegic 1                     Total Score 3   Score    Drug Therapy Frequency  20 or >    Q4 Duoneb & Q3 Albuterol PRN 15 - 19     Q6 Duoneb & Q4 Albuterol PRN 10 - 14    QID Duoneb & Q4 Albuterol PRN 5 - 9    TID Duoneb & Q6 Albuterol PRN 0 - 4    Q4 PRN Duoneb or Q4 PRN Albuterol    Incentive Spirometry - Initial RT instruct    Lung Expansion Therapy (PEP) Bronchopulmonary Hygiene (CPT)   Q4 & PRN - Severe atelectasis, poor  oxygenation Q4 - copious secretions, dyspnea, unable to sleep, mucus plugging   QID - High risk for persistent atelectasis, existence of atelectasis QID & Q4 PRN - Moderate secretion production   TID - At risk for developing atelectasis TID - small amounts of secretions with poor cough   BID - prevention of atelectasis BID - unable to breathe deeply and cough spontaneously   *RT Protocol patients will be re-assessed/re-evaluated every 48 hours.    *Patients who are home nebulizer treatments will be protocoled to no less than their home regimen and will remain     on their home regimen with re-evaluations as needed with changes in patient condition.    RT Comments/Recommendations: Prn order for as needed neb txs.

## 2023-04-29 NOTE — PROGRESS NOTES
Orthopedic Progress Note:    Radiographs reviewed, displaced right femoral neck fracture. Plan for surgery tomorrow morning. Ok for diet from orthopedic perspective. Hold chemical DVT ppx for surgery.    Germain Seo MD

## 2023-04-30 ENCOUNTER — ANESTHESIA EVENT (OUTPATIENT)
Dept: PERIOP | Facility: HOSPITAL | Age: 88
DRG: 522 | End: 2023-04-30
Payer: MEDICARE

## 2023-04-30 ENCOUNTER — ANESTHESIA (OUTPATIENT)
Dept: PERIOP | Facility: HOSPITAL | Age: 88
DRG: 522 | End: 2023-04-30
Payer: MEDICARE

## 2023-04-30 PROBLEM — M15.9 PRIMARY OSTEOARTHRITIS INVOLVING MULTIPLE JOINTS: Status: ACTIVE | Noted: 2023-04-30

## 2023-04-30 LAB
ALBUMIN SERPL-MCNC: 3.1 G/DL (ref 3.5–5.2)
ALBUMIN/GLOB SERPL: 1.3 G/DL
ALP SERPL-CCNC: 69 U/L (ref 39–117)
ALT SERPL W P-5'-P-CCNC: 10 U/L (ref 1–33)
ANION GAP SERPL CALCULATED.3IONS-SCNC: 10 MMOL/L (ref 5–15)
AST SERPL-CCNC: 15 U/L (ref 1–32)
BASOPHILS # BLD AUTO: 0.06 10*3/MM3 (ref 0–0.2)
BASOPHILS NFR BLD AUTO: 0.5 % (ref 0–1.5)
BILIRUB SERPL-MCNC: 0.3 MG/DL (ref 0–1.2)
BUN SERPL-MCNC: 39 MG/DL (ref 8–23)
BUN/CREAT SERPL: 22.5 (ref 7–25)
CALCIUM SPEC-SCNC: 8.6 MG/DL (ref 8.2–9.6)
CHLORIDE SERPL-SCNC: 104 MMOL/L (ref 98–107)
CHOLEST SERPL-MCNC: 186 MG/DL (ref 0–200)
CO2 SERPL-SCNC: 23 MMOL/L (ref 22–29)
CREAT SERPL-MCNC: 1.73 MG/DL (ref 0.57–1)
DEPRECATED RDW RBC AUTO: 52.6 FL (ref 37–54)
EGFRCR SERPLBLD CKD-EPI 2021: 26.8 ML/MIN/1.73
EOSINOPHIL # BLD AUTO: 0.37 10*3/MM3 (ref 0–0.4)
EOSINOPHIL NFR BLD AUTO: 3.2 % (ref 0.3–6.2)
ERYTHROCYTE [DISTWIDTH] IN BLOOD BY AUTOMATED COUNT: 14.6 % (ref 12.3–15.4)
GLOBULIN UR ELPH-MCNC: 2.4 GM/DL
GLUCOSE SERPL-MCNC: 114 MG/DL (ref 65–99)
HBA1C MFR BLD: 5.3 % (ref 4.8–5.6)
HCT VFR BLD AUTO: 26.4 % (ref 34–46.6)
HDLC SERPL-MCNC: 41 MG/DL (ref 40–60)
HGB BLD-MCNC: 8 G/DL (ref 12–15.9)
IMM GRANULOCYTES # BLD AUTO: 0.16 10*3/MM3 (ref 0–0.05)
IMM GRANULOCYTES NFR BLD AUTO: 1.4 % (ref 0–0.5)
LDLC SERPL CALC-MCNC: 112 MG/DL (ref 0–100)
LDLC/HDLC SERPL: 2.62 {RATIO}
LYMPHOCYTES # BLD AUTO: 1.18 10*3/MM3 (ref 0.7–3.1)
LYMPHOCYTES NFR BLD AUTO: 10.2 % (ref 19.6–45.3)
MCH RBC QN AUTO: 30.1 PG (ref 26.6–33)
MCHC RBC AUTO-ENTMCNC: 30.3 G/DL (ref 31.5–35.7)
MCV RBC AUTO: 99.2 FL (ref 79–97)
MONOCYTES # BLD AUTO: 1.11 10*3/MM3 (ref 0.1–0.9)
MONOCYTES NFR BLD AUTO: 9.6 % (ref 5–12)
NEUTROPHILS NFR BLD AUTO: 75.1 % (ref 42.7–76)
NEUTROPHILS NFR BLD AUTO: 8.64 10*3/MM3 (ref 1.7–7)
NRBC BLD AUTO-RTO: 0 /100 WBC (ref 0–0.2)
PLATELET # BLD AUTO: 232 10*3/MM3 (ref 140–450)
PMV BLD AUTO: 9.3 FL (ref 6–12)
POTASSIUM SERPL-SCNC: 4.5 MMOL/L (ref 3.5–5.2)
PROT SERPL-MCNC: 5.5 G/DL (ref 6–8.5)
RBC # BLD AUTO: 2.66 10*6/MM3 (ref 3.77–5.28)
SODIUM SERPL-SCNC: 137 MMOL/L (ref 136–145)
TRIGL SERPL-MCNC: 187 MG/DL (ref 0–150)
TSH SERPL DL<=0.05 MIU/L-ACNC: 2.19 UIU/ML (ref 0.27–4.2)
VLDLC SERPL-MCNC: 33 MG/DL (ref 5–40)
WBC NRBC COR # BLD: 11.52 10*3/MM3 (ref 3.4–10.8)

## 2023-04-30 PROCEDURE — 25010000002 ONDANSETRON PER 1 MG: Performed by: NURSE ANESTHETIST, CERTIFIED REGISTERED

## 2023-04-30 PROCEDURE — 25010000002 MORPHINE PER 10 MG: Performed by: FAMILY MEDICINE

## 2023-04-30 PROCEDURE — 0SRR0J9 REPLACEMENT OF RIGHT HIP JOINT, FEMORAL SURFACE WITH SYNTHETIC SUBSTITUTE, CEMENTED, OPEN APPROACH: ICD-10-PCS | Performed by: ORTHOPAEDIC SURGERY

## 2023-04-30 PROCEDURE — 25010000002 CEFAZOLIN PER 500 MG: Performed by: NURSE ANESTHETIST, CERTIFIED REGISTERED

## 2023-04-30 PROCEDURE — C1713 ANCHOR/SCREW BN/BN,TIS/BN: HCPCS | Performed by: ORTHOPAEDIC SURGERY

## 2023-04-30 PROCEDURE — 84443 ASSAY THYROID STIM HORMONE: CPT | Performed by: FAMILY MEDICINE

## 2023-04-30 PROCEDURE — 25010000002 FENTANYL CITRATE (PF) 100 MCG/2ML SOLUTION: Performed by: NURSE ANESTHETIST, CERTIFIED REGISTERED

## 2023-04-30 PROCEDURE — 80061 LIPID PANEL: CPT | Performed by: FAMILY MEDICINE

## 2023-04-30 PROCEDURE — 85025 COMPLETE CBC W/AUTO DIFF WBC: CPT | Performed by: FAMILY MEDICINE

## 2023-04-30 PROCEDURE — 83036 HEMOGLOBIN GLYCOSYLATED A1C: CPT | Performed by: FAMILY MEDICINE

## 2023-04-30 PROCEDURE — 80053 COMPREHEN METABOLIC PANEL: CPT | Performed by: FAMILY MEDICINE

## 2023-04-30 PROCEDURE — C1776 JOINT DEVICE (IMPLANTABLE): HCPCS | Performed by: ORTHOPAEDIC SURGERY

## 2023-04-30 DEVICE — CAP PRT HIP UNIPOLAR: Type: IMPLANTABLE DEVICE | Site: HIP | Status: FUNCTIONAL

## 2023-04-30 DEVICE — INSRT TAPER STD ENDO II: Type: IMPLANTABLE DEVICE | Site: HIP | Status: FUNCTIONAL

## 2023-04-30 DEVICE — CMT BONE R 1X40: Type: IMPLANTABLE DEVICE | Site: HIP | Status: FUNCTIONAL

## 2023-04-30 DEVICE — IMPLANTABLE DEVICE: Type: IMPLANTABLE DEVICE | Site: HIP | Status: FUNCTIONAL

## 2023-04-30 DEVICE — STEM FEM ECHO/FX COCR CMT PF STD 7X120MM: Type: IMPLANTABLE DEVICE | Site: HIP | Status: FUNCTIONAL

## 2023-04-30 DEVICE — BONE PREPARATION KIT
Type: IMPLANTABLE DEVICE | Site: HIP | Status: FUNCTIONAL
Brand: BIOPREP

## 2023-04-30 RX ORDER — FENTANYL CITRATE 50 UG/ML
25 INJECTION, SOLUTION INTRAMUSCULAR; INTRAVENOUS
Status: DISCONTINUED | OUTPATIENT
Start: 2023-04-30 | End: 2023-04-30 | Stop reason: HOSPADM

## 2023-04-30 RX ORDER — DROPERIDOL 2.5 MG/ML
0.62 INJECTION, SOLUTION INTRAMUSCULAR; INTRAVENOUS ONCE AS NEEDED
Status: DISCONTINUED | OUTPATIENT
Start: 2023-04-30 | End: 2023-04-30 | Stop reason: HOSPADM

## 2023-04-30 RX ORDER — SODIUM CHLORIDE 0.9 % (FLUSH) 0.9 %
3 SYRINGE (ML) INJECTION EVERY 12 HOURS SCHEDULED
Status: CANCELLED | OUTPATIENT
Start: 2023-04-30

## 2023-04-30 RX ORDER — QUINAPRIL 40 MG/1
40 TABLET ORAL DAILY
COMMUNITY

## 2023-04-30 RX ORDER — NALOXONE HCL 0.4 MG/ML
0.04 VIAL (ML) INJECTION AS NEEDED
Status: DISCONTINUED | OUTPATIENT
Start: 2023-04-30 | End: 2023-04-30 | Stop reason: HOSPADM

## 2023-04-30 RX ORDER — LEVOTHYROXINE SODIUM 0.05 MG/1
50 TABLET ORAL DAILY
COMMUNITY

## 2023-04-30 RX ORDER — CLONIDINE HYDROCHLORIDE 0.1 MG/1
0.1 TABLET ORAL DAILY PRN
COMMUNITY
End: 2023-05-04 | Stop reason: HOSPADM

## 2023-04-30 RX ORDER — LIDOCAINE HYDROCHLORIDE 20 MG/ML
INJECTION, SOLUTION EPIDURAL; INFILTRATION; INTRACAUDAL; PERINEURAL AS NEEDED
Status: DISCONTINUED | OUTPATIENT
Start: 2023-04-30 | End: 2023-04-30 | Stop reason: SURG

## 2023-04-30 RX ORDER — MAGNESIUM HYDROXIDE 1200 MG/15ML
LIQUID ORAL AS NEEDED
Status: DISCONTINUED | OUTPATIENT
Start: 2023-04-30 | End: 2023-04-30 | Stop reason: HOSPADM

## 2023-04-30 RX ORDER — MIDAZOLAM HYDROCHLORIDE 1 MG/ML
0.5 INJECTION INTRAMUSCULAR; INTRAVENOUS
Status: CANCELLED | OUTPATIENT
Start: 2023-04-30

## 2023-04-30 RX ORDER — HYDROMORPHONE HYDROCHLORIDE 1 MG/ML
0.5 INJECTION, SOLUTION INTRAMUSCULAR; INTRAVENOUS; SUBCUTANEOUS
Status: DISCONTINUED | OUTPATIENT
Start: 2023-04-30 | End: 2023-04-30 | Stop reason: HOSPADM

## 2023-04-30 RX ORDER — ETOMIDATE 2 MG/ML
INJECTION INTRAVENOUS AS NEEDED
Status: DISCONTINUED | OUTPATIENT
Start: 2023-04-30 | End: 2023-04-30 | Stop reason: SURG

## 2023-04-30 RX ORDER — LABETALOL HYDROCHLORIDE 5 MG/ML
5 INJECTION, SOLUTION INTRAVENOUS
Status: DISCONTINUED | OUTPATIENT
Start: 2023-04-30 | End: 2023-04-30 | Stop reason: HOSPADM

## 2023-04-30 RX ORDER — LORATADINE 10 MG/1
10 TABLET ORAL DAILY
COMMUNITY

## 2023-04-30 RX ORDER — ACETAMINOPHEN 500 MG
1000 TABLET ORAL EVERY 6 HOURS PRN
COMMUNITY

## 2023-04-30 RX ORDER — ONDANSETRON 2 MG/ML
INJECTION INTRAMUSCULAR; INTRAVENOUS AS NEEDED
Status: DISCONTINUED | OUTPATIENT
Start: 2023-04-30 | End: 2023-04-30 | Stop reason: SURG

## 2023-04-30 RX ORDER — ONDANSETRON 2 MG/ML
4 INJECTION INTRAMUSCULAR; INTRAVENOUS
Status: DISCONTINUED | OUTPATIENT
Start: 2023-04-30 | End: 2023-04-30 | Stop reason: HOSPADM

## 2023-04-30 RX ORDER — SODIUM CHLORIDE, SODIUM LACTATE, POTASSIUM CHLORIDE, CALCIUM CHLORIDE 600; 310; 30; 20 MG/100ML; MG/100ML; MG/100ML; MG/100ML
100 INJECTION, SOLUTION INTRAVENOUS CONTINUOUS
Status: CANCELLED | OUTPATIENT
Start: 2023-04-30

## 2023-04-30 RX ORDER — VECURONIUM BROMIDE 1 MG/ML
INJECTION, POWDER, LYOPHILIZED, FOR SOLUTION INTRAVENOUS AS NEEDED
Status: DISCONTINUED | OUTPATIENT
Start: 2023-04-30 | End: 2023-04-30 | Stop reason: SURG

## 2023-04-30 RX ORDER — ACETAMINOPHEN 500 MG
1000 TABLET ORAL ONCE
Status: CANCELLED | OUTPATIENT
Start: 2023-04-30 | End: 2023-04-30

## 2023-04-30 RX ORDER — LATANOPROST 50 UG/ML
1 SOLUTION/ DROPS OPHTHALMIC NIGHTLY
COMMUNITY

## 2023-04-30 RX ORDER — FENTANYL CITRATE 50 UG/ML
INJECTION, SOLUTION INTRAMUSCULAR; INTRAVENOUS AS NEEDED
Status: DISCONTINUED | OUTPATIENT
Start: 2023-04-30 | End: 2023-04-30 | Stop reason: SURG

## 2023-04-30 RX ORDER — LOPERAMIDE HYDROCHLORIDE 2 MG/1
2 CAPSULE ORAL 2 TIMES DAILY PRN
COMMUNITY

## 2023-04-30 RX ORDER — MENTHOL 40 MG/ML
1 GEL TOPICAL EVERY 6 HOURS PRN
COMMUNITY

## 2023-04-30 RX ORDER — SODIUM CHLORIDE 0.9 % (FLUSH) 0.9 %
3-10 SYRINGE (ML) INJECTION AS NEEDED
Status: CANCELLED | OUTPATIENT
Start: 2023-04-30

## 2023-04-30 RX ORDER — OXYCODONE AND ACETAMINOPHEN 10; 325 MG/1; MG/1
1 TABLET ORAL ONCE AS NEEDED
Status: DISCONTINUED | OUTPATIENT
Start: 2023-04-30 | End: 2023-04-30 | Stop reason: HOSPADM

## 2023-04-30 RX ORDER — FLUMAZENIL 0.1 MG/ML
0.2 INJECTION INTRAVENOUS AS NEEDED
Status: DISCONTINUED | OUTPATIENT
Start: 2023-04-30 | End: 2023-04-30 | Stop reason: HOSPADM

## 2023-04-30 RX ORDER — CEFAZOLIN SODIUM 1 G/3ML
INJECTION, POWDER, FOR SOLUTION INTRAMUSCULAR; INTRAVENOUS AS NEEDED
Status: DISCONTINUED | OUTPATIENT
Start: 2023-04-30 | End: 2023-04-30 | Stop reason: SURG

## 2023-04-30 RX ORDER — LIDOCAINE HYDROCHLORIDE 10 MG/ML
0.5 INJECTION, SOLUTION EPIDURAL; INFILTRATION; INTRACAUDAL; PERINEURAL ONCE AS NEEDED
Status: CANCELLED | OUTPATIENT
Start: 2023-04-30

## 2023-04-30 RX ORDER — SODIUM CHLORIDE 9 MG/ML
40 INJECTION, SOLUTION INTRAVENOUS AS NEEDED
Status: CANCELLED | OUTPATIENT
Start: 2023-04-30

## 2023-04-30 RX ADMIN — LISINOPRIL 10 MG: 10 TABLET ORAL at 15:24

## 2023-04-30 RX ADMIN — MORPHINE SULFATE 1 MG: 2 INJECTION, SOLUTION INTRAMUSCULAR; INTRAVENOUS at 06:40

## 2023-04-30 RX ADMIN — VECURONIUM BROMIDE 6 MG: 1 INJECTION, POWDER, LYOPHILIZED, FOR SOLUTION INTRAVENOUS at 11:36

## 2023-04-30 RX ADMIN — LIDOCAINE HYDROCHLORIDE 60 MG: 20 INJECTION, SOLUTION EPIDURAL; INFILTRATION; INTRACAUDAL; PERINEURAL at 11:36

## 2023-04-30 RX ADMIN — Medication 1 CAPSULE: at 15:26

## 2023-04-30 RX ADMIN — CYCLOSPORINE 1 DROP: 0.5 EMULSION OPHTHALMIC at 20:03

## 2023-04-30 RX ADMIN — CEFAZOLIN 1 G: 330 INJECTION, POWDER, FOR SOLUTION INTRAMUSCULAR; INTRAVENOUS at 11:46

## 2023-04-30 RX ADMIN — SODIUM CHLORIDE, POTASSIUM CHLORIDE, SODIUM LACTATE AND CALCIUM CHLORIDE: 600; 310; 30; 20 INJECTION, SOLUTION INTRAVENOUS at 12:58

## 2023-04-30 RX ADMIN — ONDANSETRON 4 MG: 2 INJECTION INTRAMUSCULAR; INTRAVENOUS at 13:09

## 2023-04-30 RX ADMIN — BRIMONIDINE TARTRATE 1 DROP: 2 SOLUTION/ DROPS OPHTHALMIC at 20:03

## 2023-04-30 RX ADMIN — HYDROCODONE BITARTRATE AND ACETAMINOPHEN 1 TABLET: 5; 325 TABLET ORAL at 15:22

## 2023-04-30 RX ADMIN — PANTOPRAZOLE SODIUM 40 MG: 40 INJECTION, POWDER, FOR SOLUTION INTRAVENOUS at 08:27

## 2023-04-30 RX ADMIN — TIMOLOL MALEATE 1 DROP: 5 SOLUTION/ DROPS OPHTHALMIC at 08:29

## 2023-04-30 RX ADMIN — Medication 10 ML: at 20:03

## 2023-04-30 RX ADMIN — MORPHINE SULFATE 1 MG: 2 INJECTION, SOLUTION INTRAMUSCULAR; INTRAVENOUS at 01:31

## 2023-04-30 RX ADMIN — CYCLOSPORINE 1 DROP: 0.5 EMULSION OPHTHALMIC at 08:30

## 2023-04-30 RX ADMIN — ALLOPURINOL 300 MG: 300 TABLET ORAL at 15:25

## 2023-04-30 RX ADMIN — BRIMONIDINE TARTRATE 1 DROP: 2 SOLUTION/ DROPS OPHTHALMIC at 08:29

## 2023-04-30 RX ADMIN — FENTANYL CITRATE 25 MCG: 50 INJECTION INTRAMUSCULAR; INTRAVENOUS at 11:36

## 2023-04-30 RX ADMIN — SODIUM CHLORIDE, POTASSIUM CHLORIDE, SODIUM LACTATE AND CALCIUM CHLORIDE 75 ML/HR: 600; 310; 30; 20 INJECTION, SOLUTION INTRAVENOUS at 01:34

## 2023-04-30 RX ADMIN — TIMOLOL MALEATE 1 DROP: 5 SOLUTION/ DROPS OPHTHALMIC at 20:03

## 2023-04-30 RX ADMIN — LATANOPROST 1 DROP: 50 SOLUTION OPHTHALMIC at 20:03

## 2023-04-30 RX ADMIN — CEFAZOLIN 1 G: 330 INJECTION, POWDER, FOR SOLUTION INTRAMUSCULAR; INTRAVENOUS at 12:06

## 2023-04-30 RX ADMIN — ETOMIDATE INJECTION 10 MG: 2 SOLUTION INTRAVENOUS at 11:36

## 2023-04-30 RX ADMIN — MORPHINE SULFATE 1 MG: 2 INJECTION, SOLUTION INTRAMUSCULAR; INTRAVENOUS at 20:08

## 2023-04-30 RX ADMIN — FENTANYL CITRATE 50 MCG: 50 INJECTION INTRAMUSCULAR; INTRAVENOUS at 11:49

## 2023-04-30 RX ADMIN — FENTANYL CITRATE 25 MCG: 50 INJECTION INTRAMUSCULAR; INTRAVENOUS at 11:41

## 2023-04-30 ASSESSMENT — ENCOUNTER SYMPTOMS
EYE REDNESS: 0
ABDOMINAL DISTENTION: 0
SINUS PRESSURE: 0
EYE DISCHARGE: 0
SHORTNESS OF BREATH: 0
ABDOMINAL PAIN: 0
BACK PAIN: 0
COUGH: 0

## 2023-04-30 NOTE — PLAN OF CARE
Goal Outcome Evaluation:  Plan of Care Reviewed With: patient, family        Progress: improving  Outcome Evaluation: Pt A&Ox3. Forgetful. Confused at times. Bedrest. Owens. Dsg to R hip CDI. PPP. C/o of pain w/ PRN pain meds given. Family at BS. Call light in reach. Safety maintained. Will cont to monitor.

## 2023-04-30 NOTE — CONSULTS
Orthopaedic Surgery Consult Note      4/30/2023   13:12 CDT    Name:  Kamille Saul  MRN:    [id number]     Acct:     [id number]   Room:  [facility] OR/MAIN OR  IP Day: 1     Admit Date: 4/29/2023  PCP: Shelley Tidwell MD    Consulting Provider: Dr. Jun Bennett    Subjective:     C/C: Right hip pain status post fall    HPI: Ms. Saul is a 89-year-old female who presented to [facility] ER after a ground-level fall.  She has had multiple recent falls.  She and her family state that on Thursday, she sustained a fall landing on her right hip.  She had pain but was able to bear weight.  Due to persistent pain and more difficulty bearing weight, she returned-denied any new trauma or fall.  Imaging studies at that time revealed a displaced transcervical femoral neck fracture.  Orthopedics was consulted for further evaluation and management.  On interview, she reports relatively isolated right hip pain.  Denies pain to remaining extremities.  Denies any new onset numbness or tingling to the right lower extremity.  Has bruising about her face secondary to one of her falls per her family.    Code Status:    Code Status and Medical Interventions:   Ordered at: 04/29/23 1348     Medical Intervention Limits:    NO intubation (DNI)    NO cardioversion     Code Status (Patient has no pulse and is not breathing):    No CPR (Do Not Attempt to Resuscitate)     Medical Interventions (Patient has pulse or is breathing):    Limited Support         Past Medical History:    Past Medical History:   Diagnosis Date   • Actinic keratosis of scalp    • Anemia    • Arthritis    • Chronic kidney disease, stage III (moderate)    • Eczema    • Fibromyalgia    • Glaucoma     Right eye   • Hx of migraine headaches    • Hypertension    • Hypothyroidism    • Squamous cell carcinoma of scalp        Past Surgical History:    Past Surgical History:   Procedure Laterality Date   • APPENDECTOMY     • CATARACT EXTRACTION     • ENDOSCOPY  06/07/2007    mild gastritis  otherwise normal upper endoscopy   • HYSTERECTOMY     • SKIN GRAFT     • SKIN LESION EXCISION      cyst removal    • TONSILLECTOMY         Current Medications:   Prior to Admission medications    Medication Sig Start Date End Date Taking? Authorizing Provider   allopurinol (ZYLOPRIM) 300 MG tablet Take 300 mg by mouth Daily. 12/11/16   Karen Harrington MD   brimonidine-timolol (COMBIGAN) 0.2-0.5 % ophthalmic solution Administer 1 drop to both eyes Every 12 (Twelve) Hours.    Karen Harrington MD   cycloSPORINE (RESTASIS) 0.05 % ophthalmic emulsion Administer 1 drop to both eyes 2 (Two) Times a Day.    Karen Harrington MD   lactobacillus acidophilus (RISAQUAD) capsule capsule Take 1 capsule by mouth Daily. 6/30/19   Jun Bennett MD   lisinopril (PRINIVIL,ZESTRIL) 10 MG tablet Take 1 tablet by mouth Daily. 6/30/19   Jun Bennett MD   LUMIGAN 0.01 % ophthalmic drops Administer 1 drop to the right eye Every Night. 12/31/16   Karen Harrington MD   ondansetron (ZOFRAN) 4 MG tablet Take 4 mg by mouth Every 8 (Eight) Hours As Needed for Nausea or Vomiting.    Karen Harrington MD   pantoprazole (PROTONIX) 40 MG EC tablet Take 1 tablet by mouth Daily. 3/5/19   Cat Galicia APRN   Potassium Gluconate 550 MG tablet Take 1 tablet by mouth Daily.    Karen Harrington MD   SYNTHROID 50 MCG tablet Take 50 mcg by mouth Daily. 10/27/16   Karen Harrington MD       Allergies:  Biaxin [clarithromycin]    Social History:   Social History     Socioeconomic History   • Marital status:    Tobacco Use   • Smoking status: Former   • Smokeless tobacco: Never   Substance and Sexual Activity   • Alcohol use: No     Comment: Occasional wine   • Drug use: No       Family History:   Family History   Problem Relation Age of Onset   • Thrombocytopenia Mother    • Skin cancer Father          REVIEW OF SYSTEMS:    Complete review of systems was reviewed from admission H&P on date of admission, no  "interval changes.      Vitals:  /61 (BP Location: Left arm, Patient Position: Lying)   Pulse 91   Temp 97.9 °F (36.6 °C) (Oral)   Resp 14   Ht 154.9 cm (61\")   Wt 55.3 kg (122 lb)   SpO2 91%   BMI 23.05 kg/m²   Temp (24hrs), Av °F (36.7 °C), Min:97.9 °F (36.6 °C), Max:98.3 °F (36.8 °C)      I/O (24Hr):    Intake/Output Summary (Last 24 hours) at 2023 1312  Last data filed at 2023 1310  Gross per 24 hour   Intake 1200 ml   Output 650 ml   Net 550 ml       Labs:  Lab Results (last 72 hours)     Procedure Component Value Units Date/Time    Hemoglobin A1c [[id number]]  (Normal) Collected: 23    Specimen: Blood Updated: 23     Hemoglobin A1C 5.30 %     Narrative:      Hemoglobin A1C Ranges:    Increased Risk for Diabetes  5.7% to 6.4%  Diabetes                     >= 6.5%  Diabetic Goal                < 7.0%    TSH [[id number]]  (Normal) Collected: 23    Specimen: Blood Updated: 23     TSH 2.190 uIU/mL     Comprehensive Metabolic Panel [[id number]]  (Abnormal) Collected: 23    Specimen: Blood Updated: 23     Glucose 114 mg/dL      BUN 39 mg/dL      Creatinine 1.73 mg/dL      Sodium 137 mmol/L      Potassium 4.5 mmol/L      Chloride 104 mmol/L      CO2 23.0 mmol/L      Calcium 8.6 mg/dL      Total Protein 5.5 g/dL      Albumin 3.1 g/dL      ALT (SGPT) 10 U/L      AST (SGOT) 15 U/L      Alkaline Phosphatase 69 U/L      Total Bilirubin 0.3 mg/dL      Globulin 2.4 gm/dL      A/G Ratio 1.3 g/dL      BUN/Creatinine Ratio 22.5     Anion Gap 10.0 mmol/L      eGFR 26.8 mL/min/1.73     Narrative:      GFR Normal >60  Chronic Kidney Disease <60  Kidney Failure <15    The GFR formula is only valid for adults with stable renal function between ages 18 and 70.    Lipid Panel [[id number]]  (Abnormal) Collected: 23 0330    Specimen: Blood Updated: 23 0407     Total Cholesterol 186 mg/dL      Triglycerides 187 mg/dL      HDL Cholesterol " 41 mg/dL      LDL Cholesterol  112 mg/dL      VLDL Cholesterol 33 mg/dL      LDL/HDL Ratio 2.62    Narrative:      Cholesterol Reference Ranges  (U.S. Department of Health and Human Services ATP III Classifications)    Desirable          <200 mg/dL  Borderline High    200-239 mg/dL  High Risk          >240 mg/dL      Triglyceride Reference Ranges  (U.S. Department of Health and Human Services ATP III Classifications)    Normal           <150 mg/dL  Borderline High  150-199 mg/dL  High             200-499 mg/dL  Very High        >500 mg/dL    HDL Reference Ranges  (U.S. Department of Health and Human Services ATP III Classifications)    Low     <40 mg/dl (major risk factor for CHD)  High    >60 mg/dl ('negative' risk factor for CHD)        LDL Reference Ranges  (U.S. Department of Health and Human Services ATP III Classifications)    Optimal          <100 mg/dL  Near Optimal     100-129 mg/dL  Borderline High  130-159 mg/dL  High             160-189 mg/dL  Very High        >189 mg/dL    CBC Auto Differential [[id number]]  (Abnormal) Collected: 04/30/23 0330    Specimen: Blood Updated: 04/30/23 0350     WBC 11.52 10*3/mm3      RBC 2.66 10*6/mm3      Hemoglobin 8.0 g/dL      Hematocrit 26.4 %      MCV 99.2 fL      MCH 30.1 pg      MCHC 30.3 g/dL      RDW 14.6 %      RDW-SD 52.6 fl      MPV 9.3 fL      Platelets 232 10*3/mm3      Neutrophil % 75.1 %      Lymphocyte % 10.2 %      Monocyte % 9.6 %      Eosinophil % 3.2 %      Basophil % 0.5 %      Immature Grans % 1.4 %      Neutrophils, Absolute 8.64 10*3/mm3      Lymphocytes, Absolute 1.18 10*3/mm3      Monocytes, Absolute 1.11 10*3/mm3      Eosinophils, Absolute 0.37 10*3/mm3      Basophils, Absolute 0.06 10*3/mm3      Immature Grans, Absolute 0.16 10*3/mm3      nRBC 0.0 /100 WBC     Comprehensive Metabolic Panel [[id number]]  (Abnormal) Collected: 04/29/23 1510    Specimen: Blood Updated: 04/29/23 1547     Glucose 118 mg/dL      BUN 40 mg/dL      Creatinine 1.66 mg/dL       Sodium 137 mmol/L      Potassium 4.8 mmol/L      Chloride 101 mmol/L      CO2 23.0 mmol/L      Calcium 9.4 mg/dL      Total Protein 7.1 g/dL      Albumin 4.0 g/dL      ALT (SGPT) 14 U/L      AST (SGOT) 24 U/L      Alkaline Phosphatase 87 U/L      Total Bilirubin 0.4 mg/dL      Globulin 3.1 gm/dL      A/G Ratio 1.3 g/dL      BUN/Creatinine Ratio 24.1     Anion Gap 13.0 mmol/L      eGFR 28.1 mL/min/1.73     Narrative:      GFR Normal >60  Chronic Kidney Disease <60  Kidney Failure <15    The GFR formula is only valid for adults with stable renal function between ages 18 and 70.    CBC & Differential [[id number]]  (Abnormal) Collected: 04/29/23 1510    Specimen: Blood Updated: 04/29/23 1531    Narrative:      The following orders were created for panel order CBC & Differential.  Procedure                               Abnormality         Status                     ---------                               -----------         ------                     CBC Auto Differential[[id number]]        Abnormal            Final result                 Please view results for these tests on the individual orders.    CBC Auto Differential [[id number]]  (Abnormal) Collected: 04/29/23 1510    Specimen: Blood Updated: 04/29/23 1531     WBC 14.35 10*3/mm3      RBC 3.70 10*6/mm3      Hemoglobin 11.2 g/dL      Hematocrit 36.3 %      MCV 98.1 fL      MCH 30.3 pg      MCHC 30.9 g/dL      RDW 14.2 %      RDW-SD 51.0 fl      MPV 9.5 fL      Platelets 281 10*3/mm3      Neutrophil % 77.9 %      Lymphocyte % 9.8 %      Monocyte % 8.9 %      Eosinophil % 1.3 %      Basophil % 0.8 %      Immature Grans % 1.3 %      Neutrophils, Absolute 11.20 10*3/mm3      Lymphocytes, Absolute 1.40 10*3/mm3      Monocytes, Absolute 1.27 10*3/mm3      Eosinophils, Absolute 0.18 10*3/mm3      Basophils, Absolute 0.11 10*3/mm3      Immature Grans, Absolute 0.19 10*3/mm3      nRBC 0.0 /100 WBC     Urinalysis With Culture If Indicated - Straight Cath [[id number]]   (Abnormal) Collected: 04/29/23 1441    Specimen: Urine from Straight Cath Updated: 04/29/23 1458     Color, UA Yellow     Appearance, UA Clear     pH, UA 5.5     Specific Gravity, UA 1.022     Glucose, UA Negative     Ketones, UA Negative     Bilirubin, UA Negative     Blood, UA Negative     Protein,  mg/dL (2+)     Leuk Esterase, UA Negative     Nitrite, UA Negative     Urobilinogen, UA 0.2 E.U./dL    Narrative:      In absence of clinical symptoms, the presence of pyuria, bacteria, and/or nitrites on the urinalysis result does not correlate with infection.    Urinalysis, Microscopic Only - Straight Cath [[id number]]  (Abnormal) Collected: 04/29/23 1441    Specimen: Urine from Straight Cath Updated: 04/29/23 1458     RBC, UA 0-2 /HPF      WBC, UA 0-2 /HPF      Comment: Urine culture not indicated.        Bacteria, UA None Seen /HPF      Squamous Epithelial Cells, UA 0-2 /HPF      Hyaline Casts, UA 0-2 /LPF      Methodology Automated Microscopy    aPTT [[id number]]  (Normal) Collected: 04/29/23 1407    Specimen: Blood Updated: 04/29/23 1435     PTT 32.8 seconds     Protime-INR [[id number]]  (Normal) Collected: 04/29/23 1407    Specimen: Blood Updated: 04/29/23 1435     Protime 13.3 Seconds      INR 1.00          Radiology:  Imaging Results (Last 72 Hours)     Procedure Component Value Units Date/Time    XR Hip With or Without Pelvis 2 - 3 View Right [[id number]] Collected: 04/29/23 1226     Updated: 04/29/23 1231    Narrative:      EXAMINATION: AP pelvis and two-view right hip 04/29/2023     HISTORY: Fracture.     FINDINGS: AP radiograph the pelvis as well as AP and crosstable lateral  view the right hip demonstrate impacted fracture of the femoral neck  with varus deformity. The femoral head is concentric and well located  within the acetabulum. There is overlying soft tissue swelling/hematoma.       Impression:      1.. Impacted fracture the of right femoral neck.  This report was finalized on 04/29/2023 12:28  by Dr. Ludin Singer MD.    CT Pelvis Without Contrast [[id number]] Collected: 04/29/23 0958     Updated: 04/29/23 1007    Narrative:      EXAMINATION: CT pelvis without contrast 04/29/2023     HISTORY: Fracture suspected. Right hip pain after fall.     FINDINGS: Multiple contiguous axial images are obtained through the bony  pelvis per protocol without intravenous contrast-enhancement with  reformatted images obtained in the sagittal and coronal projections from  the original data set.     There is osteopenia of the bones. There is anterolisthesis of L5 on S1  likely representing subluxation at the level of facets. There is mild  listhesis of L4 on L5 as well. There is associated central and foraminal  stenosis at these 2 lumbar disc levels.     There is a mildly impacted and displaced right femoral neck fracture.  The right femoral head remains concentric and well located within the  acetabulum. No additional fractures are identified. The SI joints and  pubic symphysis are intact with no evidence of diastases. There is  overlying soft tissue swelling and hematoma overlying the right hip.  This includes a discrete 5 x 2.5 cm hematoma.       Impression:      1.. Mildly impacted and displaced fracture of the right femoral neck  with overlying soft tissue stranding and hematoma. The femoral head  remains concentric and well located within the acetabulum. No additional  fractures are present.  This report was finalized on 04/29/2023 10:04 by Dr. Ludin Singer MD.          Physical Exam:     PHYSICAL EXAM:      Physical Examination:  Vitals:   Vitals:    04/29/23 2003 04/30/23 0009 04/30/23 0412 04/30/23 0814   BP: 133/62 133/60 139/59 153/61   BP Location: Left arm Right arm Left arm Left arm   Patient Position: Lying Lying Lying Lying   Pulse: 96 91 92 91   Resp: 18 16 16 14   Temp: 97.9 °F (36.6 °C) 97.9 °F (36.6 °C) 97.9 °F (36.6 °C) 97.9 °F (36.6 °C)   TempSrc: Oral Oral Oral Oral   SpO2: 94% 94% 91% 91%    Weight:       Height:         General:  Appears stated age, no distress.  Orientation:  Alert and oriented to time, place, and person.  HEENT: Head is normocephalic, atraumatic.  No gross visual or auditory acuity deficits.  Mood and Affect:  Cooperative and pleasant.  Gait:  Resting comfortably in bed.  Cardiovascular:  Symmetric 1-2 plus pulses in upper and lower extremities.  Sensation:  Grossly intact to light touch.  Coordination/balance:  Normal  Musculoskeletal:  Right upper extremity exam:  There is no tenderness to palpation about the shoulder, elbow, wrist or hand.  Unrestricted full function motion is present.  Stability is normal with provocative tests, 5/5 strength, and skin is normal.      Left upper extremity exam:  There is no tenderness to palpation about the shoulder, elbow, wrist or hand.  Unrestricted full function motion is present.  Stability is normal with provocative tests, 5/5 strength, and skin is normal.     Right lower extremity exam: Large ecchymosis noted about the right hip, no open skin wounds or lesions.  Lower extremity is shortened and externally rotated compared to contralateral limb.  Palpation about the hip was deferred.  No significant tenderness about the knee, tibia/fibula, ankle or foot.  Gross sensation is intact about the right foot.  EHL, FHL, tibialis anterior and gastrocsoleus complex motor intact.  Right foot is warm and perfused.    Left lower extremity exam:  There is no tenderness to palpation about the hip, knee, ankle or foot.  Unrestricted full function motion is present.  Stability is normal with provocative tests, 5/5 strength, and skin is normal.      Radiology Review  My review of patient's x-rays shows the patient to have displaced right transcervical femoral neck fracture.    Assessment:     89 y/o F s/p fall with displaced R femoral neck fracture, significant medical history for anemia, stage III chronic kidney disease, fibromyalgia, hypertension,  hypothyroidism    Plan:   1. Right hip fracture: Reviewed clinical and radiographic findings with the patient. Discussed treatment options including both conservative and operative intervention. Risks and benefits were discussed with the patient and her family yesterday evening. Ultimately, patient and her family have elected to proceed with operative intervention. Surgical risks including, but not limited to, bleeding, infection, wound healing problems, nonunion, malunion, hardware failure, hardware prominence, need for additional procedures, DVT/PE, leg length discrepancy, post-operative instability, injury to surrounding anatomic structures with subsequent limb dysfunction, and issues with anesthesia-including death, were discussed. Written and informed consent were obtained.   2. Activity: Bedrest  3. Pain control  4. DVT ppx: Hold chemical ppx for surgery  5. Disposition: Pending post-op course

## 2023-04-30 NOTE — OP NOTE
Orthopedic History and Physical    Patient Name: Kamille Saul  MRN: [id number]  YOB: 1927  Date: 4/30/2023     PREOPERATIVE DIAGNOSIS:   1.  Right transcervical displaced femoral neck fracture.   2.  Anemia  3.  Stage III chronic kidney disease  4.  Hypothyroidism  5.  Hypertension  6.  History of fibromyalgia  7.  History of recurrent falls     POSTOPERATIVE DIAGNOSIS:   1.  Right transcervical displaced femoral neck fracture.   2.  Anemia  3.  Stage III chronic kidney disease  4.  Hypothyroidism  5.  Hypertension  6.  History of fibromyalgia  7.  History of recurrent falls     PROCEDURE:   1.  Right cemented hip hemiarthroplasty.      SURGEON: Germain Seo MD     ASSISTANT: None     ANESTHESIA: General endotracheal anesthesia.      ESTIMATED BLOOD LOSS: 200 mL.      COMPLICATIONS: None.      CONDITION: Stable.      COMPONENTS: Anton Echo size 7 mm stem, 41 mm femoral head.      HISTORY: This is a 89-year-old female who sustained a ground-level fall with the above-stated injury. The patient presented to the emergency department with complaints of hip pain. X-rays demonstrated a subcapital femoral neck fracture. Based on this, the decision was made to take the patient to the operating room for the above-mentioned procedure. After the risks and benefits had been discussed with the patient and the patient was medically cleared, the patient was taken to the operative suite.      DESCRIPTION OF PROCEDURE: The patient was met in the preanesthesia area, where the correct patient, procedure and side were confirmed. The operative site was marked by myself in agreement with the patient. The patient was then taken to the operative suite. A formal timeout was held with myself and the operating team, again, identifying the correct patient, procedure and side. General anesthesia was then induced. Patient was then transferred to the operating table and positioned in the left lateral decubitus position over a  pegboard. An axillary roll was placed. Patient was secured to the table, pegs were well padded. A PAS boot was applied to the nonoperative lower extremity. Once the pelvis was stabilized patient was then prepped and draped in a standard sterile fashion using ChloraPrep.     A standard anterolateral, modified Hardinge, approach was carried out to the hip. Skin incision was made with a scalpel. Bovie was used to dissect subcutaneous tissue onto the IT band which was split longitudinally in line with the incision. The Charnley retractor was then placed and the bursa was then resected. The gluteus medius was identified and the junction between the anterior 1/3 and posterior 2/3 was identified. Bovie was utilized to split the gluteus medius at that location. Subperiosteal dissection was carried out over the anterior aspect of the greater trochanter onto the femoral neck with care to protect the vastus lateralis distally. Proximally, dissection was carried onto the superior aspect of the femoral neck and the capsule was split down to the labrum. The femoral neck fracture was then identified. The lesser trochanter was then palpated and a femoral neck cut approximately 3/4 of a fingerbreadth proximal to the lesser trochanter was made with an oscillating saw. The femoral head was then identified and removed uneventfully. It was sized to a 41mm. The acetabulum was palpated, no free bony fragments were appreciated. A same size trial femoral head was placed and found to be adequate.      Attention was then turned to the femur. A proximal femoral elevator was placed. A box osteotome was utilized to gain entry into the proximal femur. A canal finder was then used to find the canal. We then sequentially broached up to a size 9 echo stem matching patient's native version. We then used a calcar planer. A trial standard neck and 41 mm head were placed. Trial reductions demonstrated a stable construct. Clinically, leg lengths were  approximately equal.     The trial components were dislocated and removed uneventfully. The femoral canal was cleaned and dried. Cement was then mixed and placed within the canal. The final femoral stem component was then placed, again matching native version, and held in position while the cement dried. After the cement stabilized, I palpated the acetabulum, no free soft tissue, bone debris or cement was present. The acetabulum was then irrigated and dried. The final femoral head component was placed and impacted into position. The hip was reduced and, again, found to be stable with approximately equal leg lengths.      The wound was irrigated with normal saline. The capsule was closed with figure-of-eight #1 Ethibond suture. The gluteus medius was reapproximated with a combination of #1 Ethibond sutures were passed through the trochanter and tied over a bone bridge and 2-0 Vicryl sutures. The IT band was reapproximated with #1 Vicryl suture. The hip was irrigated again. Subcutaneous tissue was re-approximate with buried 2-0 Vicryl sutures. Skin was approximated with staples. The patient awoke from anesthesia without difficulty and was transferred to the PACU in stable condition. All sponge, needle and instrument counts were correct at the end of the procedure.       Post-Op Plan:  1. WBAT to operative extremity, anterior hip precautions.  2. Pain control  3. PT/OT  4. DVT prophylaxis: SCD's, chemical prophylaxis for one month  5. Discharge planning

## 2023-04-30 NOTE — ANESTHESIA PREPROCEDURE EVALUATION
Anesthesia Evaluation     Patient summary reviewed and Nursing notes reviewed   NPO Solid Status: > 8 hours  NPO Liquid Status: > 8 hours           Airway   Mallampati: I  TM distance: >3 FB  Neck ROM: full  No difficulty expected  Dental    (+) edentulous    Pulmonary - normal exam   (+) a smoker Former, recent URI,   Cardiovascular - normal exam  Exercise tolerance: poor (<4 METS)    (+) hypertension well controlled 2 medications or greater,       Neuro/Psych  (+) poor historian.,    GI/Hepatic/Renal/Endo    (+)   renal disease CRI, thyroid problem hypothyroidism    Musculoskeletal     (+) arthralgias,   Abdominal  - normal exam   Substance History - negative use     OB/GYN negative ob/gyn ROS         Other   arthritis,    history of cancer remission                    Anesthesia Plan    ASA 4 - emergent     general     intravenous induction     Anesthetic plan, risks, benefits, and alternatives have been provided, discussed and informed consent has been obtained with: patient and spouse/significant other.        CODE STATUS:    Medical Intervention Limits: NO intubation (DNI); NO cardioversion  Code Status (Patient has no pulse and is not breathing): No CPR (Do Not Attempt to Resuscitate)  Medical Interventions (Patient has pulse or is breathing): Limited Support

## 2023-04-30 NOTE — PROGRESS NOTES
[facility] Medicine Services  INPATIENT PROGRESS NOTE    Patient Name: Kamille Saul  Date of Admission: 4/29/2023  Today's Date: 04/30/23  Length of Stay: 1  Primary Care Physician: Shelley Tidwell MD    Subjective   Chief Complaint: Right hip fracture/falling    HPI   Status post right hip surgery.  Patient is just came back to the room.  Patient still altered from sedation.  Patient is on room air.  Slightly increasing creatinine.  Thrombocytosis improving.  Decrease in hemoglobin.    Review of Systems   Constitutional: Positive for activity change, appetite change and fatigue. Negative for chills and fever.   HENT: Negative for hearing loss, nosebleeds, tinnitus and trouble swallowing.    Eyes: Negative for visual disturbance.   Respiratory: Negative for cough, chest tightness, shortness of breath and wheezing.    Cardiovascular: Negative for chest pain, palpitations and leg swelling.   Gastrointestinal: Negative for abdominal distention, abdominal pain, blood in stool, constipation, diarrhea, nausea and vomiting.   Endocrine: Negative for cold intolerance, heat intolerance, polydipsia, polyphagia and polyuria.   Genitourinary: Negative for decreased urine volume, difficulty urinating, dysuria, flank pain, frequency and hematuria.   Musculoskeletal: Positive for arthralgias, gait problem and myalgias. Negative for joint swelling.        Right hip pain   Skin: Negative for rash.   Allergic/Immunologic: Negative for immunocompromised state.   Neurological: Positive for weakness. Negative for dizziness, syncope, light-headedness and headaches.   Hematological: Negative for adenopathy. Does not bruise/bleed easily.   Psychiatric/Behavioral: Negative for confusion and sleep disturbance. The patient is not nervous/anxious.    All pertinent negatives and positives are as above. All other systems have been reviewed and are negative unless otherwise stated.     Objective    Temp:   [97.3 °F (36.3 °C)-98.3 °F (36.8 °C)] 97.3 °F (36.3 °C)  Heart Rate:  [] 92  Resp:  [14-18] 18  BP: (133-169)/(59-77) 153/70  Physical Exam  Vitals and nursing note reviewed.   Constitutional:       Comments: Advanced age .   HENT:      Head: Normocephalic and atraumatic.   Eyes:      Conjunctiva/sclera: Conjunctivae normal.      Pupils: Pupils are equal, round, and reactive to light.   Neck:      Vascular: No JVD.   Cardiovascular:      Rate and Rhythm: Normal rate and regular rhythm.      Heart sounds: Normal heart sounds.   Pulmonary:      Effort: No respiratory distress.      Breath sounds: No wheezing or rales.      Comments: Diminished breath sound bilateral, on room air, clear.  Chest:      Chest wall: No tenderness.   Abdominal:      General: Bowel sounds are normal. There is no distension.      Palpations: Abdomen is soft.      Tenderness: There is no abdominal tenderness.   Musculoskeletal:         General: No tenderness or deformity.      Cervical back: Neck supple.   Skin:     General: Skin is warm and dry.      Capillary Refill: Capillary refill takes 2 to 3 seconds.      Findings: No rash.   Neurological:      Mental Status: She is alert and oriented to person, place, and time.      Cranial Nerves: No cranial nerve deficit.      Motor: Weakness present. No abnormal muscle tone.      Coordination: Coordination abnormal.      Gait: Gait abnormal.      Deep Tendon Reflexes: Reflexes normal.      Comments: Status post right hip surgery.  Psychiatric:         Mood and Affect: Mood normal.         Behavior: Behavior normal.         Thought Content: Thought content normal.         Judgment: Judgment normal.       Results Review:  I have reviewed the labs, radiology results, and diagnostic studies.    Laboratory Data:   Results from last 7 days   Lab Units 04/30/23  0330 04/29/23  1510   WBC 10*3/mm3 11.52* 14.35*   HEMOGLOBIN g/dL 8.0* 11.2*   HEMATOCRIT % 26.4* 36.3   PLATELETS 10*3/mm3 232 281         Results from last 7 days   Lab Units 04/30/23  0330 04/29/23  1510   SODIUM mmol/L 137 137   POTASSIUM mmol/L 4.5 4.8   CHLORIDE mmol/L 104 101   CO2 mmol/L 23.0 23.0   BUN mg/dL 39* 40*   CREATININE mg/dL 1.73* 1.66*   CALCIUM mg/dL 8.6 9.4   BILIRUBIN mg/dL 0.3 0.4   ALK PHOS U/L 69 87   ALT (SGPT) U/L 10 14   AST (SGOT) U/L 15 24   GLUCOSE mg/dL 114* 118*       Culture Data:   No results found for: BLOODCX, URINECX, WOUNDCX, MRSACX, RESPCX, STOOLCX    Radiology Data:   Imaging Results (Last 24 Hours)     ** No results found for the last 24 hours. **          I have reviewed the patient's current medications.     Assessment/Plan   Assessment  Active Hospital Problems    Diagnosis    • **Closed displaced fracture of right femoral neck    • Anemia    • Stage 3 chronic kidney disease (CMS/HCC), declining function    • Hypothyroidism (acquired)        Treatment Plan  Right hip fracture.  Orthopedic consult.  Plan for surgery today.  Patient will remain n.p.o. for now.  Norco as needed.  Morphine as needed.  Leukocytosis improving.  CT scan-Mildly impacted and displaced fracture of the right femoral neck  with overlying soft tissue stranding and hematoma. The femoral head  remains concentric and well located within the acetabulum. No additional  fractures are present.  Status post 4/30/23-Right cemented hip hemiarthroplasty.      Chronic stage III renal failure.  Creatinine is 1.7.  IV hydration.       Hypertension/hyperlipidemia.  Lisinopril.     Hypothyroidism.  Synthroid.  TSH-normal    Anemia.  Decrease in hemoglobin.  Status post surgery.     Gout.  Allopurinol.     Reflux.  Protonix IV.  Zofran as needed.     Nutrition . Probiotic.  N.p.o. for now.     Medical Decision Making  Number and Complexity of problems: Right hip fracture/hypertension/hypothyroidism/reflux  Differential Diagnosis: None.     Conditions and Status  Right hip fracture.     [facility] Data  External documents reviewed: Previous notes and  lab  Cardiac tracing (EKG, telemetry) interpretation: Sinus.  Radiology interpretation: CT scan  Labs reviewed: Laboratory  Any tests that were considered but not ordered: Labs in a.m.     Decision rules/scores evaluated (example [id number], Wells, etc): None     Discussed with: Patient and daughter     Care Planning  Shared decision making: Patient and daughter and ER physician.  Code status and discussions: DNR . DNI.     Disposition  Social Determinants of Health that impact treatment or disposition: Unknown.  Estimated length of stay is 2 to 5 days.     Electronically signed by Jun Bennett MD, 04/30/23, 13:32 CDT.

## 2023-04-30 NOTE — CONSULTS
20 gauge 2.5 inch USGPIV placed in left upper brachial vein with 2 attempt(s). Initial attempt in left forearm with 1.75 inch 22 g went well. Good blood return and flushed easily. But patient complained of pain when flushing. Able to visualize flushing in vessel, but pt continued to complain of pain when flushing forearm IV. Suspect that the vein had been previously irritated by an IV failure and flushing caused pain in that area. IV removed and restarted.

## 2023-04-30 NOTE — ANESTHESIA PROCEDURE NOTES
Airway  Urgency: elective    Airway not difficult    General Information and Staff    Patient location during procedure: OR  CRNA/CAA: Pj Bethea CRNA    Indications and Patient Condition  Indications for airway management: airway protection    Preoxygenated: yes  MILS maintained throughout  Mask difficulty assessment: 1 - vent by mask    Final Airway Details  Final airway type: endotracheal airway      Successful airway: ETT  Cuffed: yes   Successful intubation technique: direct laryngoscopy  Endotracheal tube insertion site: oral  Blade: Valverde  Blade size: 2  ETT size (mm): 7.5  Cormack-Lehane Classification: grade I - full view of glottis  Placement verified by: chest auscultation and capnometry   Cuff volume (mL): 5  Measured from: lips  ETT/EBT  to lips (cm): 20  Number of attempts at approach: 1

## 2023-05-01 PROBLEM — D62 ACUTE BLOOD LOSS ANEMIA: Status: ACTIVE | Noted: 2023-05-01

## 2023-05-01 LAB
ANION GAP SERPL CALCULATED.3IONS-SCNC: 9 MMOL/L (ref 5–15)
BUN SERPL-MCNC: 37 MG/DL (ref 8–23)
BUN/CREAT SERPL: 22.4 (ref 7–25)
CALCIUM SPEC-SCNC: 8.5 MG/DL (ref 8.2–9.6)
CHLORIDE SERPL-SCNC: 104 MMOL/L (ref 98–107)
CO2 SERPL-SCNC: 24 MMOL/L (ref 22–29)
CREAT SERPL-MCNC: 1.65 MG/DL (ref 0.57–1)
DEPRECATED RDW RBC AUTO: 52.4 FL (ref 37–54)
EGFRCR SERPLBLD CKD-EPI 2021: 28.3 ML/MIN/1.73
ERYTHROCYTE [DISTWIDTH] IN BLOOD BY AUTOMATED COUNT: 14.5 % (ref 12.3–15.4)
GLUCOSE SERPL-MCNC: 128 MG/DL (ref 65–99)
HCT VFR BLD AUTO: 23.8 % (ref 34–46.6)
HCT VFR BLD AUTO: 24.9 % (ref 34–46.6)
HGB BLD-MCNC: 7.1 G/DL (ref 12–15.9)
HGB BLD-MCNC: 7.5 G/DL (ref 12–15.9)
IRON 24H UR-MRATE: 12 MCG/DL (ref 37–145)
IRON SATN MFR SERPL: 6 % (ref 20–50)
MCH RBC QN AUTO: 30 PG (ref 26.6–33)
MCHC RBC AUTO-ENTMCNC: 29.8 G/DL (ref 31.5–35.7)
MCV RBC AUTO: 100.4 FL (ref 79–97)
PLATELET # BLD AUTO: 220 10*3/MM3 (ref 140–450)
PMV BLD AUTO: 9.5 FL (ref 6–12)
POTASSIUM SERPL-SCNC: 4.5 MMOL/L (ref 3.5–5.2)
RBC # BLD AUTO: 2.37 10*6/MM3 (ref 3.77–5.28)
SODIUM SERPL-SCNC: 137 MMOL/L (ref 136–145)
TIBC SERPL-MCNC: 204 MCG/DL (ref 298–536)
TRANSFERRIN SERPL-MCNC: 137 MG/DL (ref 200–360)
WBC NRBC COR # BLD: 11.32 10*3/MM3 (ref 3.4–10.8)

## 2023-05-01 PROCEDURE — 83540 ASSAY OF IRON: CPT | Performed by: INTERNAL MEDICINE

## 2023-05-01 PROCEDURE — 25010000002 NA FERRIC GLUC CPLX PER 12.5 MG: Performed by: NURSE PRACTITIONER

## 2023-05-01 PROCEDURE — 85018 HEMOGLOBIN: CPT | Performed by: NURSE PRACTITIONER

## 2023-05-01 PROCEDURE — 25010000002 MORPHINE PER 10 MG: Performed by: FAMILY MEDICINE

## 2023-05-01 PROCEDURE — 84466 ASSAY OF TRANSFERRIN: CPT | Performed by: INTERNAL MEDICINE

## 2023-05-01 PROCEDURE — 97162 PT EVAL MOD COMPLEX 30 MIN: CPT

## 2023-05-01 PROCEDURE — 97530 THERAPEUTIC ACTIVITIES: CPT

## 2023-05-01 PROCEDURE — 97535 SELF CARE MNGMENT TRAINING: CPT | Performed by: OCCUPATIONAL THERAPIST

## 2023-05-01 PROCEDURE — 97166 OT EVAL MOD COMPLEX 45 MIN: CPT | Performed by: OCCUPATIONAL THERAPIST

## 2023-05-01 PROCEDURE — 85027 COMPLETE CBC AUTOMATED: CPT | Performed by: FAMILY MEDICINE

## 2023-05-01 PROCEDURE — 80048 BASIC METABOLIC PNL TOTAL CA: CPT | Performed by: FAMILY MEDICINE

## 2023-05-01 PROCEDURE — 85014 HEMATOCRIT: CPT | Performed by: NURSE PRACTITIONER

## 2023-05-01 RX ORDER — BISACODYL 10 MG
10 SUPPOSITORY, RECTAL RECTAL DAILY PRN
Status: DISCONTINUED | OUTPATIENT
Start: 2023-05-01 | End: 2023-05-04 | Stop reason: HOSPADM

## 2023-05-01 RX ORDER — AMOXICILLIN 250 MG
1 CAPSULE ORAL 2 TIMES DAILY
Status: DISCONTINUED | OUTPATIENT
Start: 2023-05-01 | End: 2023-05-04 | Stop reason: HOSPADM

## 2023-05-01 RX ORDER — ENOXAPARIN SODIUM 100 MG/ML
30 INJECTION SUBCUTANEOUS EVERY 24 HOURS
Status: DISCONTINUED | OUTPATIENT
Start: 2023-05-02 | End: 2023-05-04 | Stop reason: HOSPADM

## 2023-05-01 RX ORDER — POLYETHYLENE GLYCOL 3350 17 G/17G
17 POWDER, FOR SOLUTION ORAL DAILY
Status: DISCONTINUED | OUTPATIENT
Start: 2023-05-01 | End: 2023-05-04 | Stop reason: HOSPADM

## 2023-05-01 RX ADMIN — CYCLOSPORINE 1 DROP: 0.5 EMULSION OPHTHALMIC at 10:06

## 2023-05-01 RX ADMIN — BRIMONIDINE TARTRATE 1 DROP: 2 SOLUTION/ DROPS OPHTHALMIC at 10:06

## 2023-05-01 RX ADMIN — ALLOPURINOL 300 MG: 300 TABLET ORAL at 10:06

## 2023-05-01 RX ADMIN — TIMOLOL MALEATE 1 DROP: 5 SOLUTION/ DROPS OPHTHALMIC at 10:06

## 2023-05-01 RX ADMIN — CYCLOSPORINE 1 DROP: 0.5 EMULSION OPHTHALMIC at 21:44

## 2023-05-01 RX ADMIN — LEVOTHYROXINE SODIUM 50 MCG: 50 TABLET ORAL at 05:32

## 2023-05-01 RX ADMIN — SODIUM CHLORIDE, POTASSIUM CHLORIDE, SODIUM LACTATE AND CALCIUM CHLORIDE 50 ML/HR: 600; 310; 30; 20 INJECTION, SOLUTION INTRAVENOUS at 22:10

## 2023-05-01 RX ADMIN — MORPHINE SULFATE 1 MG: 2 INJECTION, SOLUTION INTRAMUSCULAR; INTRAVENOUS at 01:15

## 2023-05-01 RX ADMIN — BRIMONIDINE TARTRATE 1 DROP: 2 SOLUTION/ DROPS OPHTHALMIC at 21:43

## 2023-05-01 RX ADMIN — SODIUM CHLORIDE, POTASSIUM CHLORIDE, SODIUM LACTATE AND CALCIUM CHLORIDE 75 ML/HR: 600; 310; 30; 20 INJECTION, SOLUTION INTRAVENOUS at 01:15

## 2023-05-01 RX ADMIN — LATANOPROST 1 DROP: 50 SOLUTION OPHTHALMIC at 21:43

## 2023-05-01 RX ADMIN — POLYETHYLENE GLYCOL 3350 17 G: 17 POWDER, FOR SOLUTION ORAL at 21:44

## 2023-05-01 RX ADMIN — LISINOPRIL 10 MG: 10 TABLET ORAL at 10:05

## 2023-05-01 RX ADMIN — MORPHINE SULFATE 1 MG: 2 INJECTION, SOLUTION INTRAMUSCULAR; INTRAVENOUS at 05:32

## 2023-05-01 RX ADMIN — MORPHINE SULFATE 1 MG: 2 INJECTION, SOLUTION INTRAMUSCULAR; INTRAVENOUS at 22:01

## 2023-05-01 RX ADMIN — Medication 1 CAPSULE: at 10:05

## 2023-05-01 RX ADMIN — SODIUM CHLORIDE 250 MG: 9 INJECTION, SOLUTION INTRAVENOUS at 14:31

## 2023-05-01 RX ADMIN — Medication 10 ML: at 21:44

## 2023-05-01 RX ADMIN — MORPHINE SULFATE 1 MG: 2 INJECTION, SOLUTION INTRAMUSCULAR; INTRAVENOUS at 12:50

## 2023-05-01 RX ADMIN — TIMOLOL MALEATE 1 DROP: 5 SOLUTION/ DROPS OPHTHALMIC at 21:43

## 2023-05-01 RX ADMIN — DOCUSATE SODIUM 50 MG AND SENNOSIDES 8.6 MG 1 TABLET: 8.6; 5 TABLET, FILM COATED ORAL at 21:44

## 2023-05-01 RX ADMIN — Medication 10 ML: at 10:05

## 2023-05-01 RX ADMIN — PANTOPRAZOLE SODIUM 40 MG: 40 INJECTION, POWDER, FOR SOLUTION INTRAVENOUS at 10:05

## 2023-05-01 NOTE — THERAPY TREATMENT NOTE
Acute Care - Physical Therapy Treatment Note  [facility]     Patient Name: Kamille Saul  : 3/22/1927  MRN: [id number]  Today's Date: 2023      Visit Dx:     ICD-10-CM ICD-9-CM   1. Closed displaced fracture of right femoral neck  S72.001A 820.8   2. Impaired functional mobility and activity tolerance  Z74.09 V49.89     Patient Active Problem List   Diagnosis   • Altered mental status   • Diarrhea of presumed infectious origin   • Viral upper respiratory tract infection   • Hypothyroidism (acquired)   • Hyponatremia   • Asymptomatic bacteriuria   • Stage 3 chronic kidney disease (CMS/HCC), declining function   • Dehydration   • Anemia   • Malnutrition   • Transaminitis   • Closed displaced fracture of right femoral neck   • Acute blood loss anemia     Past Medical History:   Diagnosis Date   • Actinic keratosis of scalp    • Anemia    • Arthritis    • Chronic kidney disease, stage III (moderate)    • Eczema    • Fibromyalgia    • Glaucoma     Right eye   • Hx of migraine headaches    • Hypertension    • Hypothyroidism    • Squamous cell carcinoma of scalp      Past Surgical History:   Procedure Laterality Date   • APPENDECTOMY     • CATARACT EXTRACTION     • ENDOSCOPY  2007    mild gastritis otherwise normal upper endoscopy   • HIP HEMIARTHROPLASTY Right 2023    Procedure: HIP HEMIARTHROPLASTY;  Surgeon: Germain Seo MD;  Location: [facility];  Service: Orthopedics;  Laterality: Right;   • HYSTERECTOMY     • SKIN GRAFT     • SKIN LESION EXCISION      cyst removal    • TONSILLECTOMY       PT Assessment (last 12 hours)     PT Evaluation and Treatment     Row Name 23 1310          Physical Therapy Time and Intention    Subjective Information complains of;pain  -KJ     Document Type therapy note (daily note)  -KJ     Mode of Treatment physical therapy  -KJ     Patient Effort good  -KJ     Row Name 23 1310          General Information    Existing Precautions/Restrictions fall;hip,  anterior;right  WBAT  -KJ     Row Name 05/01/23 1310          Pain    Pretreatment Pain Rating 2/10  -KJ     Posttreatment Pain Rating 9/10  -KJ     Pain Location - Side/Orientation Right  -KJ     Pain Location - hip  -KJ     Row Name 05/01/23 1310          Mobility    Extremity Weight-bearing Status right lower extremity  -KJ     Right Lower Extremity (Weight-bearing Status) weight-bearing as tolerated (WBAT)  -KJ     Row Name 05/01/23 1310          Bed Mobility    Supine-Sit [place] (Bed Mobility) verbal cues;maximum assist (25% patient effort);2 person assist  -KJ     Sit-Supine [place] (Bed Mobility) dependent (less than 25% patient effort);2 person assist  -KJ     Row Name 05/01/23 1310          Sit-Stand Transfer    Sit-Stand [place] (Transfers) verbal cues;minimum assist (75% patient effort);moderate assist (50% patient effort);2 person assist  -KJ     Assistive Device (Sit-Stand Transfers) walker, front-wheeled  -KJ     Row Name 05/01/23 1310          Stand-Sit Transfer    Stand-Sit [place] (Transfers) maximum assist (25% patient effort);2 person assist  -KJ     Assistive Device (Stand-Sit Transfers) walker, front-wheeled  -KJ     Row Name 05/01/23 1310          Gait/Stairs (Locomotion)    Comment, (Gait/Stairs) pivot to BSC and BTB mod/maxA x 2  -KJ     Row Name             Wound 04/30/23 1200 Right hip Incision    Wound - Properties Group Placement Date: 04/30/23  -SB Placement Time: 1200  -SB Present on Hospital Admission: N  -SB Side: Right  -SB Location: hip  -SB Primary Wound Type: Incision  -SB    Retired Wound - Properties Group Placement Date: 04/30/23  -SB Placement Time: 1200  -SB Present on Hospital Admission: N  -SB Side: Right  -SB Location: hip  -SB Primary Wound Type: Incision  -SB    Retired Wound - Properties Group Date first assessed: 04/30/23  -SB Time first assessed: 1200  -SB Present on Hospital Admission: N  -SB Side: Right  -SB Location: hip  -SB Primary Wound  Type: Incision  -SB    Row Name 05/01/23 1310          Positioning and Restraints    Pre-Treatment Position in bed  -KJ     Post Treatment Position bed  -KJ     In Bed call light within reach  -KJ           User Key  (r) = Recorded By, (t) = Taken By, (c) = Cosigned By    Initials Name Provider Type    Ashley Monet, PTA Physical Therapist Assistant    Radha Egan, RN Registered Nurse                Physical Therapy Education     Title: PT OT SLP Therapies (In Progress)     Topic: Physical Therapy (In Progress)     Point: Mobility training (Done)     Learning Progress Summary           Patient Acceptance, E,TB,D, QING,TALON,NR by NATALIE at 5/1/2023 1038    Comment: Education re: purpose of PT/importance of activity, for safety/falls prevention, improved tech w/ bed mobility and tfers, proper use of rwx, increase awareness of upright posture and for balance corrections   Family Acceptance, E,TB,D, QING,DU,NR by NATALIE at 5/1/2023 1038    Comment: Education re: purpose of PT/importance of activity, for safety/falls prevention, improved tech w/ bed mobility and tfers, proper use of rwx, increase awareness of upright posture and for balance corrections                   Point: Home exercise program (Not Started)     Learner Progress:  Not documented in this visit.          Point: Precautions (Done)     Learning Progress Summary           Patient Acceptance, E,TB,D, QING,DU,NR by NATALIE at 5/1/2023 1038    Comment: Education re: purpose of PT/importance of activity, for safety/falls prevention, improved tech w/ bed mobility and tfers, proper use of rwx, increase awareness of upright posture and for balance corrections   Family Acceptance, E,TB,SALVADOR, QING,TALON,NR by NATALIE at 5/1/2023 1038    Comment: Education re: purpose of PT/importance of activity, for safety/falls prevention, improved tech w/ bed mobility and tfers, proper use of rwx, increase awareness of upright posture and for balance corrections                               User Key      Initials Effective Dates Name Provider Type Discipline     08/02/18 -  Niya Cote, PT Physical Therapist PT              PT Recommendation and Plan             Time Calculation:    PT Charges     Row Name 05/01/23 1341 05/01/23 1036          Time Calculation    Start Time 1310  -KJ 0858  -     Stop Time 1341  -KJ 1008  -JE     Time Calculation (min) 31 min  -KJ 70 min  -     PT Received On 05/01/23  -KJ 05/01/23  -     PT Goal Re-Cert Due Date 05/11/23  -KJ 05/11/23  -           User Key  (r) = Recorded By, (t) = Taken By, (c) = Cosigned By    Initials Name Provider Type    Ashley Monet, PTA Physical Therapist Assistant    Niya Shell, PT Physical Therapist              Therapy Charges for Today     Code Description Service Date Service Provider Modifiers Qty    38170940908 HC PT THERAPEUTIC ACT EA 15 MIN 5/1/2023 Ashley Michaels PTA GP 2          PT G-Codes  Outcome Measure Options: AM-PAC 6 Clicks Basic Mobility (PT)  AM-PAC 6 Clicks Score (PT): 9    Ashley Michaels PTA  5/1/2023

## 2023-05-01 NOTE — PROGRESS NOTES
Orthopedic Surgery Progress Note    Kamille aSul  5/1/2023      Subjective:     No acute events overnight. Pain stable.  Denies new numbness or tingling to the right lower extremity.  Denies chest pain, shortness of breath.    Objective:     Patient Vitals for the past 24 hrs:   BP Temp Temp src Pulse Resp SpO2   05/01/23 1500 107/60 98.1 °F (36.7 °C) Axillary 96 18 (!) 86 %   05/01/23 0700 133/54 97.8 °F (36.6 °C) Axillary 95 18 100 %   05/01/23 0401 139/70 98.1 °F (36.7 °C) Oral 100 18 96 %   04/30/23 2300 104/64 -- -- 107 16 91 %   04/30/23 2011 120/49 -- -- -- -- --   04/30/23 1900 (!) 116/36 97.6 °F (36.4 °C) Oral 102 18 100 %       General: Awake, alert  Respiratory: Nonlabored  CV: Regular rate  Extremity: RLE: Dressings in place, clean, dry and intact.  Stable neurovascular exam.      Data Review:  Lab Results (last 24 hours)     Procedure Component Value Units Date/Time    Hemoglobin & Hematocrit, Blood [[id number]]  (Abnormal) Collected: 05/01/23 1243    Specimen: Blood Updated: 05/01/23 1301     Hemoglobin 7.5 g/dL      Hematocrit 24.9 %     Iron Profile [[id number]]  (Abnormal) Collected: 05/01/23 0527    Specimen: Blood Updated: 05/01/23 0948     Iron 12 mcg/dL      Iron Saturation 6 %      Transferrin 137 mg/dL      TIBC 204 mcg/dL     Basic Metabolic Panel [[id number]]  (Abnormal) Collected: 05/01/23 0527    Specimen: Blood Updated: 05/01/23 0621     Glucose 128 mg/dL      BUN 37 mg/dL      Creatinine 1.65 mg/dL      Sodium 137 mmol/L      Potassium 4.5 mmol/L      Chloride 104 mmol/L      CO2 24.0 mmol/L      Calcium 8.5 mg/dL      BUN/Creatinine Ratio 22.4     Anion Gap 9.0 mmol/L      eGFR 28.3 mL/min/1.73     Narrative:      GFR Normal >60  Chronic Kidney Disease <60  Kidney Failure <15    The GFR formula is only valid for adults with stable renal function between ages 18 and 70.    CBC (No Diff) [[id number]]  (Abnormal) Collected: 05/01/23 0527    Specimen: Blood Updated: 05/01/23 0602     WBC  11.32 10*3/mm3      RBC 2.37 10*6/mm3      Hemoglobin 7.1 g/dL      Hematocrit 23.8 %      .4 fL      MCH 30.0 pg      MCHC 29.8 g/dL      RDW 14.5 %      RDW-SD 52.4 fl      MPV 9.5 fL      Platelets 220 10*3/mm3         Imaging Results (Last 24 Hours)     ** No results found for the last 24 hours. **          Assessment:     89 y/o F POD#1 s/p R hip hemiarthroplasty, leukocytosis, acute on chronic anemia, stage III chronic kidney disease, hypothyroidism, hypertension, history of fibromyalgia    Plan:     1:  Post-op acute blood loss anemia: H/H 7.5/24.9, VSS, asymptomatic, no plans for transfusion from my standpoint  2:  Pain: Controlled  3:  Activity: Weightbearing as tolerated right lower extremity  4:  Dressing: c/d/i. Plan to leave in place for 2 weeks- may change if it becomes saturated.  5:  Post-op abx  6:  Physical therapy/Occupational therapy  7:  DVT ppx: PAS boots, ambulation, Lovenox in hospital with plans to transition to oral anticoagulation at home for 1 month postop  8:  Dispo: D/c planning, await PT/OT sherry Seo MD

## 2023-05-01 NOTE — PLAN OF CARE
Goal Outcome Evaluation:  Plan of Care Reviewed With: patient, family        Progress: no change  Outcome Evaluation: PT eval completed.  Pt pleasant and agreeable to therapy.  Oriented to person, hint/clues for time, disoriented to place and agreeable to situation.  Pt reports pain at R shld and R thigh w/ noted swelling at both w/ bruising and bruising at face.  Pt gaurded w/ mvmt requiring max dependent assist to move supine to sit.  Decrease sitting balance w/ a posterior lean requiring variable assist from max to SBA.  Pt performed sit to stand w/ min to CGA X2  two different times w/ decrease time tolerance to standing on 2nd attempt.  Noted flexed posture in standing leaning forward on walker.  Pt returned to bed dependent on 2 staff and was dependent to scoot up in bed.  Pt will benefit from continued PT services to restore ROM, improve strength, to assist w/ pain control and edema mgmt, to improve her ability to perform bed mobility and tfers w/ less assist and progress to walking short distances w/ rwx.  Will follow for progress and needs, however based on this visit, feel pt would benefit from stay in SNF for rehab.

## 2023-05-01 NOTE — CASE MANAGEMENT/SOCIAL WORK
Discharge Planning Assessment  [facility]     Patient Name: Kamille Saul  MRN: [id number]  Today's Date: 5/1/2023    Admit Date: 4/29/2023        Discharge Needs Assessment     Row Name 05/01/23 0834       Living Environment    People in Home facility resident    Name(s) of People in Home [facility] SUITES PERSONAL CARE    Current Living Arrangements assisted living facility    Potentially Unsafe Housing Conditions none    Primary Care Provided by other (see comments);child(bo);self    Provides Primary Care For no one, unable/limited ability to care for self    Caregiving Concerns ELDERLY, FX HIP. BRUISING FROM FALL    Family Caregiver if Needed child(bo), adult    Family Caregiver Names Ceci Chase, daughter, Leanne, daughter    Quality of Family Relationships helpful;involved;supportive    Able to Return to Prior Arrangements no       Resource/Environmental Concerns    Resource/Environmental Concerns none    Transportation Concerns none       Food Insecurity    Within the past 12 months, you worried that your food would run out before you got the money to buy more. Never true    Within the past 12 months, the food you bought just didn't last and you didn't have money to get more. Never true       Transition Planning    Patient/Family Anticipates Transition to inpatient rehabilitation facility    Patient/Family Anticipated Services at Transition rehabilitation services    Transportation Anticipated family or friend will provide;other (see comments)  EMs might be required       Discharge Needs Assessment    Readmission Within the Last 30 Days no previous admission in last 30 days    Current Outpatient/Agency/Support Group assisted living facility  [facility]    Equipment Currently Used at Home walker, rolling;power chair,(recliner lift);ramp;grab bar;walker, standard    Concerns to be Addressed no discharge needs identified    Anticipated Changes Related to Illness inability to care for self     Equipment Needed After Discharge none    Outpatient/Agency/Support Group Needs inpatient rehabilitation facility;skilled nursing facility    Discharge Facility/Level of Care Needs nursing facility, skilled;rehabilitation facility    Provided Post Acute Provider List? Yes    Post Acute Provider List Nursing Home;Inpatient Rehab    Provided Post Acute Provider Quality & Resource List? Yes    Post Acute Provider Quality and Resource List Inpatient Rehab;Nursing Home    Delivered To Support Person    Support Person Ceci Chase, daughter    Method of Delivery Telephone    Patient's Choice of Community Agency(s) Pending decisions. [facility] Acute rehab vs SNF, not sure since PT/OT evals are pending    Discharge Coordination/Progress Patient lives at [facility]. DME at facility RW. Recent falls. Daughter considering [facility] Acute rehab vs SNF. Pending PT/OT evals. Patient has PCP and rx coverage. Will follow for referrals as family decides. SW updated.               Discharge Plan    No documentation.               Continued Care and Services - Admitted Since 4/29/2023    Coordination has not been started for this encounter.          Demographic Summary    No documentation.                Functional Status    No documentation.                Psychosocial    No documentation.                Abuse/Neglect    No documentation.                Legal    No documentation.                Substance Abuse    No documentation.                Patient Forms    No documentation.                   Leah Potter, BRAYDON

## 2023-05-01 NOTE — PLAN OF CARE
"  Problem: Adult Inpatient Plan of Care  Goal: Plan of Care Review  Recent Flowsheet Documentation  Taken 5/1/2023 0854 by Dorian Raines, OTR/L  Progress: no change  Plan of Care Reviewed With:   patient   family  Outcome Evaluation: OT evaluation completed. Pt is agreeable to therapy. Pt oriented to self and requires verbal cues for time. Disoriented to place and situation. Pt is conversationally confused with decreased insight into deficits. Pt is easily distracted and requires verbal cues for redirection to task at times. Pt reports \"a lot\" of pain in R thigh pre tx, but 2 on garcia baker scale. Pt with noted grimaccing with movement to R shoulder. Per family pt landed on R shoulder during a fall. LUE AROM WFL. Pt is guarded with all shoulder movement to RAFFY AAROM completed to 25% shoulder flexion. Pt was 25% impaired in R elbow flexion/extension. R hand WFL. Difficult to formally assess strength but B  4-/5. Pt was dependent to adjust socks. Pt max-dep x2 for bed mobility. Pt was SBA-max for static sitting balance, which fluccuated throughout session. Pt responded well to verbal and tactile cues for static sitting balance. Pt with forward flexed posture. Pt was CGA-min A x2 with RW for sit to stand t/f 2x. Pt with decreased activity tolerance, so further activity deferred. Pt would benefit from skilled OT to address adls, functional mobility and endurance. Recommended d/c SNF for continued therapy.     "

## 2023-05-01 NOTE — PROGRESS NOTES
[facility] Medicine Services  INPATIENT PROGRESS NOTE    Patient Name: Kamille Saul  Date of Admission: 4/29/2023  Today's Date: 05/01/23  Length of Stay: 2  Primary Care Physician: Shelley Tidwell MD    Subjective   Chief Complaint: Right hip pain  HPI   Ms. Saul is a 89-year-old female that presented to [facility] after a fall landing on her right hip on Thursday 4/27.  She had pain but was able to bear weight.  She was evaluated in the emergency department at that time and had a CT head, CT spine, x-ray of the pelvis and shoulder.  She has had increased difficulty walking with her walker and complained of worsening right hip pain.  Work-up in the ER revealed a displaced transcervical femoral neck fracture.  CT head negative for acute intracranial normality.  Right periorbital and right frontal scalp soft tissue swelling with no fracture.    Sitting up in bed with daughter present at bedside.  Per daughter, she has been somewhat drowsy today.  She awakens easily to her name.  She denies any shortness of breath or chest pain.  She complains of right hip pain rated 9 out of 10.  I asked her nurse Quita to get her some pain medicine as she has not had any since around 6 AM.    Review of Systems   All pertinent negatives and positives are as above. All other systems have been reviewed and are negative unless otherwise stated.     Objective    Temp:  [97.1 °F (36.2 °C)-98.3 °F (36.8 °C)] 97.8 °F (36.6 °C)  Heart Rate:  [] 95  Resp:  [14-18] 18  BP: (104-176)/(36-77) 133/54  Physical Exam  Vitals reviewed.   Constitutional:       General: She is not in acute distress.     Appearance: She is ill-appearing. She is not toxic-appearing.      Interventions: Nasal cannula in place.      Comments: Sitting up in bed.  No acute distress.  On 2 L nasal cannula.  Daughter is at bedside.  Discussed with her nurse Quita.   HENT:      Head: Normocephalic and atraumatic.       Comments: bruising around eyes     Mouth/Throat:      Mouth: Mucous membranes are moist.      Pharynx: Oropharynx is clear.   Eyes:      Extraocular Movements: Extraocular movements intact.      Conjunctiva/sclera: Conjunctivae normal.      Pupils: Pupils are equal, round, and reactive to light.   Cardiovascular:      Rate and Rhythm: Normal rate and regular rhythm.      Pulses: Normal pulses.   Pulmonary:      Effort: Pulmonary effort is normal. No respiratory distress.      Breath sounds: Normal breath sounds. No wheezing.   Abdominal:      General: Bowel sounds are normal. There is no distension.      Palpations: Abdomen is soft.      Tenderness: There is no abdominal tenderness.   Musculoskeletal:         General: No swelling or tenderness. Normal range of motion.      Cervical back: Normal range of motion and neck supple. No muscular tenderness.      Comments: Dressing to right hip clean/dry/intact with periwound bruising   Skin:     General: Skin is warm and dry.      Coloration: Skin is pale.      Findings: Bruising present.   Neurological:      General: No focal deficit present.      Mental Status: She is alert and oriented to person, place, and time.      Cranial Nerves: No cranial nerve deficit.      Motor: No weakness.   Psychiatric:         Mood and Affect: Mood normal.         Behavior: Behavior normal.         Results Review:  I have reviewed the labs, radiology results, and diagnostic studies.    Laboratory Data:   Results from last 7 days   Lab Units 05/01/23 0527 04/30/23  0330 04/29/23  1510   WBC 10*3/mm3 11.32* 11.52* 14.35*   HEMOGLOBIN g/dL 7.1* 8.0* 11.2*   HEMATOCRIT % 23.8* 26.4* 36.3   PLATELETS 10*3/mm3 220 232 281        Results from last 7 days   Lab Units 05/01/23 0527 04/30/23  0330 04/29/23  1510   SODIUM mmol/L 137 137 137   POTASSIUM mmol/L 4.5 4.5 4.8   CHLORIDE mmol/L 104 104 101   CO2 mmol/L 24.0 23.0 23.0   BUN mg/dL 37* 39* 40*   CREATININE mg/dL 1.65* 1.73* 1.66*   CALCIUM  mg/dL 8.5 8.6 9.4   BILIRUBIN mg/dL  --  0.3 0.4   ALK PHOS U/L  --  69 87   ALT (SGPT) U/L  --  10 14   AST (SGOT) U/L  --  15 24   GLUCOSE mg/dL 128* 114* 118*       Culture Data:   Microbiology Results (last 10 days)     ** No results found for the last 240 hours. **        Radiology Data:   Imaging Results (Last 24 Hours)     ** No results found for the last 24 hours. **        I have reviewed the patient's current medications.     Assessment/Plan   Assessment  Active Hospital Problems    Diagnosis    • **Closed displaced fracture of right femoral neck    • Acute blood loss anemia    • Stage 3 chronic kidney disease (CMS/HCC), declining function    • Hypothyroidism (acquired)        Treatment Plan  Orthopedics, Dr. Seo consulted.  She underwent right cemented hip hemiarthroplasty on 4/30/2023 per Dr. Seo.  Weightbearing as tolerated to right lower extremity.    Acute blood loss anemia.  Hemoglobin 11.2 on admission.  Hemoglobin has slowly down trended to 7.1.  Estimated blood loss 200 mL.  Repeat H&H at noon.  May require blood transfusion. Iron panel.    Incentive spirometer.    Bowel regimen.    PT/OT.    Medical Decision Making  Number and Complexity of problems: 2 acute problems in the form of closed displaced fracture of right femoral neck with need for surgical intervention and acute blood loss anemia  Differential Diagnosis: None considered at present    Conditions and Status        Condition is unchanged.     [facility] Data  External documents reviewed: Prior [facility] records  Cardiac tracing (EKG, telemetry) interpretation: Normal sinus rhythm  Radiology interpretation: Interpreted by radiology  Labs reviewed: As above  Any tests that were considered but not ordered: None considered at present     Decision rules/scores evaluated (example [id number], Wells, etc): None considered at present     Discussed with: Patient and Dr. Piedra     Care Planning  Shared decision making: Patient is agreeable to ongoing  work-up and treatment  Code status and discussions: No support with limited support to include no intubation and no cardioversion    Disposition  Social Determinants of Health that impact treatment or disposition: Likely needs skilled nursing facility placement -family requests [facility]  I expect the patient to be discharged to skilled nursing facility placement in 1-2 days.     Electronically signed by CLARK Israel, 05/01/23, 12:09 CDT.

## 2023-05-01 NOTE — THERAPY EVALUATION
Patient Name: Kamille Saul  : 3/22/1927    MRN: [id number]                              Today's Date: 2023       Admit Date: 2023    Visit Dx:     ICD-10-CM ICD-9-CM   1. Closed displaced fracture of right femoral neck  S72.001A 820.8   2. Impaired functional mobility and activity tolerance  Z74.09 V49.89   3. Decreased activities of daily living (ADL)  Z78.9 V49.89     Patient Active Problem List   Diagnosis   • Altered mental status   • Diarrhea of presumed infectious origin   • Viral upper respiratory tract infection   • Hypothyroidism (acquired)   • Hyponatremia   • Asymptomatic bacteriuria   • Stage 3 chronic kidney disease (CMS/HCC), declining function   • Dehydration   • Anemia   • Malnutrition   • Transaminitis   • Closed displaced fracture of right femoral neck   • Acute blood loss anemia     Past Medical History:   Diagnosis Date   • Actinic keratosis of scalp    • Anemia    • Arthritis    • Chronic kidney disease, stage III (moderate)    • Eczema    • Fibromyalgia    • Glaucoma     Right eye   • Hx of migraine headaches    • Hypertension    • Hypothyroidism    • Squamous cell carcinoma of scalp      Past Surgical History:   Procedure Laterality Date   • APPENDECTOMY     • CATARACT EXTRACTION     • ENDOSCOPY  2007    mild gastritis otherwise normal upper endoscopy   • HIP HEMIARTHROPLASTY Right 2023    Procedure: HIP HEMIARTHROPLASTY;  Surgeon: Germain Seo MD;  Location: [facility];  Service: Orthopedics;  Laterality: Right;   • HYSTERECTOMY     • SKIN GRAFT     • SKIN LESION EXCISION      cyst removal    • TONSILLECTOMY        General Information     Row Name 23 0854          OT Time and Intention    Document Type evaluation  Pt admitted s/p fall.  R femur fx s/p R hip hemiarthroplasty  -MM     Mode of Treatment occupational therapy  -MM     Row Name 23 0854          General Information    Patient Profile Reviewed yes  -MM     Prior Level of Function  independent:;all household mobility;community mobility;ADL's  -MM     Existing Precautions/Restrictions fall;hip, anterior;right;other (see comments)  WBAT RLE  -MM     Barriers to Rehab medically complex;cognitive status;physical barrier  -MM     Row Name 05/01/23 0854          Living Environment    People in Home facility resident  MARICARMEN, utilizing RW recently. Increasing falls reported by family  -MM     Row Name 05/01/23 0854          Home Main Entrance    Number of Stairs, Main Entrance none  -MM     Row Name 05/01/23 0854          Stairs Within Home, Primary    Stair Railings, Within Home, Primary none  -MM     Row Name 05/01/23 0854          Cognition    Orientation Status (Cognition) oriented to;person;verbal cues/prompts needed for orientation;time;disoriented to;place;other (see comments)  -MM     Row Name 05/01/23 0854          Safety Issues, Functional Mobility    Safety Issues Affecting Function (Mobility) insight into deficits/self-awareness;friction/shear risk;safety precaution awareness;safety precautions follow-through/compliance  -MM     Impairments Affecting Function (Mobility) balance;cognition;endurance/activity tolerance;pain;range of motion (ROM);postural/trunk control;shortness of breath;strength  -MM     Cognitive Impairments, Mobility Safety/Performance safety precaution awareness;safety precaution follow-through  -MM           User Key  (r) = Recorded By, (t) = Taken By, (c) = Cosigned By    Initials Name Provider Type    MM Dorian Raines, OTR/L Occupational Therapist                 Mobility/ADL's     Row Name 05/01/23 0854          Bed Mobility    Bed Mobility scooting/bridging;supine-sit;sit-supine  -MM     Scooting/Bridging [place] (Bed Mobility) dependent (less than 25% patient effort);2 person assist;verbal cues  -MM     Supine-Sit [place] (Bed Mobility) verbal cues;maximum assist (25% patient effort);2 person assist  -MM     Sit-Supine [place] (Bed Mobility)  dependent (less than 25% patient effort);2 person assist  -MM     Assistive Device (Bed Mobility) bed rails;draw sheet;head of bed elevated  -MM     [facility] Name 05/01/23 0854          Transfers    Transfers sit-stand transfer;stand-sit transfer  -MM     [facility] Name 05/01/23 0854          Sit-Stand Transfer    Sit-Stand Keith (Transfers) minimum assist (75% patient effort);contact guard;verbal cues;2 person assist  -MM     Assistive Device (Sit-Stand Transfers) walker, front-wheeled  -MM     Row Name 05/01/23 0854          Stand-Sit Transfer    Stand-Sit Keith (Transfers) minimum assist (75% patient effort);contact guard;verbal cues  -MM     Assistive Device (Stand-Sit Transfers) walker, front-wheeled  -MM     Comment, (Stand-Sit Transfer) further mobility d/f d/t pt's activity tolerance  -MM     Row Name 05/01/23 08          Activities of Daily Living    BADL Assessment/Intervention lower body dressing  -MM     Row Name 05/01/23 08          Mobility    Extremity Weight-bearing Status right lower extremity  -MM     Right Lower Extremity (Weight-bearing Status) weight-bearing as tolerated (WBAT)  -MM     Row Name 05/01/23 0854          Lower Body Dressing Assessment/Training    Keith Level (Lower Body Dressing) dependent (less than 25% patient effort);verbal cues  adjust socks  -MM     Position (Lower Body Dressing) supine  -MM           User Key  (r) = Recorded By, (t) = Taken By, (c) = Cosigned By    Initials Name Provider Type    MM Dorian Raines, OTR/L Occupational Therapist               Obj/Interventions     [facility] Name 05/01/23 0854          Sensory Assessment (Somatosensory)    Sensory Assessment (Somatosensory) unable/difficult to assess  -MM     Row Name 05/01/23 0854          Range of Motion Comprehensive    Comment, General Range of Motion LUE AROM WFL. Pt is guarded with all shoulder movement to RUMOHINDER AAROM completed to 25% shoulder flexion. Pt was 25% impaired in R elbow  flexion/extension. R hand WFL  -MM     Row Name 05/01/23 0854          Strength Comprehensive (MMT)    Comment, General Manual Muscle Testing (MMT) Assessment Difficult to formal assess, B  4-/5  -MM     Row Name 05/01/23 0854          Balance    Balance Assessment sitting static balance;standing static balance  -MM     Static Sitting Balance standby assist;maximum assist;verbal cues  fluccuates throughout session  -MM     Position, Sitting Balance supported;sitting edge of bed  -MM     Static Standing Balance minimal assist;contact guard;verbal cues;2-person assist  -MM     Position/Device Used, Standing Balance supported;walker, rolling  -MM           User Key  (r) = Recorded By, (t) = Taken By, (c) = Cosigned By    Initials Name Provider Type    MM Dorian Raines, OTR/L Occupational Therapist               Goals/Plan     Row Name 05/01/23 0854          Dressing Goal 1 (OT)    Activity/Device (Dressing Goal 1, OT) dressing skills, all  -MM     [place]/Cues Needed (Dressing Goal 1, OT) minimum assist (75% or more patient effort);set-up required;verbal cues required  -MM     Time Frame (Dressing Goal 1, OT) long term goal (LTG);by discharge  -MM     Progress/Outcome (Dressing Goal 1, OT) new goal  -MM     Row Name 05/01/23 0854          Toileting Goal 1 (OT)    Activity/Device (Toileting Goal 1, OT) toileting skills, all;commode, bedside without drop arms  -MM     [place] Level/Cues Needed (Toileting Goal 1, OT) minimum assist (75% or more patient effort);set-up required;verbal cues required  -MM     Time Frame (Toileting Goal 1, OT) long term goal (LTG);by discharge  -MM     Progress/Outcome (Toileting Goal 1, OT) new goal  -MM     Row Name 05/01/23 0854          Grooming Goal 1 (OT)    Activity/Device (Grooming Goal 1, OT) grooming skills, all  -MM     [place] (Grooming Goal 1, OT) minimum assist (75% or more patient effort);set-up required;verbal cues required  -MM     Time Frame (Grooming  "Goal 1, OT) long term goal (LTG);by discharge  -MM     Strategies/Barriers (Grooming Goal 1, OT) sitting EOB  -MM     Progress/Outcome (Grooming Goal 1, OT) new goal  -MM     Row Name 05/01/23 0854          Therapy Assessment/Plan (OT)    Planned Therapy Interventions (OT) activity tolerance training;adaptive equipment training;BADL retraining;cognitive/visual perception retraining;functional balance retraining;neuromuscular control/coordination retraining;occupation/activity based interventions;passive ROM/stretching;patient/caregiver education/training;ROM/therapeutic exercise;strengthening exercise;transfer/mobility retraining  -MM           User Key  (r) = Recorded By, (t) = Taken By, (c) = Cosigned By    Initials Name Provider Type    MM Dorian Raines, OTR/L Occupational Therapist               Clinical Impression     Row Name 05/01/23 0854          Pain Assessment    Pre/Posttreatment Pain Comment \"a lot\" in R thigh  -MM     Row Name 05/01/23 0854          Pain Scale: FACES Pre/Post-Treatment    Pain: FACES Scale, Pretreatment 2-->hurts little bit  -MM     Posttreatment Pain Rating 2-->hurts little bit  -MM     Pain Location - Side/Orientation Right  -MM     Pain Location lower  -MM     Pain Location - extremity;shoulder  -MM     Row Name 05/01/23 0854          Plan of Care Review    Plan of Care Reviewed With patient;family  -MM     Progress no change  -MM     Outcome Evaluation OT evaluation completed. Pt is agreeable to therapy. Pt oriented to self and requires verbal cues for time. Disoriented to place and situation. Pt is conversationally confused with decreased insight into deficits. Pt is easily distracted and requires verbal cues for redirection to task at times. Pt reports \"a lot\" of pain in R thigh pre tx, but 2 on garcia baker scale. Pt with noted grimaccing with movement to R shoulder. Per family pt landed on R shoulder during a fall. LUE AROM WFL. Pt is guarded with all shoulder movement to RUE, " AAROM completed to 25% shoulder flexion. Pt was 25% impaired in R elbow flexion/extension. R hand WFL. Difficult to formally assess strength but B  4-/5. Pt was dependent to adjust socks. Pt max-dep x2 for bed mobility. Pt was SBA-max for static sitting balance, which fluccuated throughout session. Pt responded well to verbal and tactile cues for static sitting balance. Pt with forward flexed posture. Pt was CGA-min A x2 with RW for sit to stand t/f 2x. Pt with decreased activity tolerance, so further activity deferred. Pt would benefit from skilled OT to address adls, functional mobility and endurance. Recommended d/c SNF for continued therapy.  -MM     Row Name 05/01/23 0854          Therapy Assessment/Plan (OT)    Patient/Family Therapy Goal Statement (OT) return home  -MM     Rehab Potential (OT) good, to achieve stated therapy goals  -MM     Criteria for Skilled Therapeutic Interventions Met (OT) yes;skilled treatment is necessary  -MM     Therapy Frequency (OT) 5 times/wk  -MM     Predicted Duration of Therapy Intervention (OT) until d/c  -MM     Row Name 05/01/23 0854          Therapy Plan Review/Discharge Plan (OT)    Anticipated Discharge Disposition (OT) skilled nursing facility  -MM     Row Name 05/01/23 0854          Vital Signs    O2 Delivery Pre Treatment supplemental O2  -MM     O2 Delivery Intra Treatment supplemental O2  -MM     O2 Delivery Post Treatment supplemental O2  -MM     Pre Patient Position Supine  -MM     Intra Patient Position Standing  -MM     Post Patient Position Supine  -MM     Row Name 05/01/23 0854          Positioning and Restraints    Pre-Treatment Position in bed  -MM     Post Treatment Position bed  -MM     In Bed fowlers;call light within reach;encouraged to call for assist;exit alarm on;with family/caregiver;with nsg;side rails up x3;legs elevated;heels elevated;RUE elevated;LUE elevated  -MM           User Key  (r) = Recorded By, (t) = Taken By, (c) = Cosigned By     Initials Name Provider Type    CHRIS Dorian Raines, OTR/L Occupational Therapist               Outcome Measures     Row Name 05/01/23 0854          How much help from another is currently needed...    Putting on and taking off regular lower body clothing? 1  -MM     Bathing (including washing, rinsing, and drying) 1  -MM     Toileting (which includes using toilet bed pan or urinal) 1  -MM     Putting on and taking off regular upper body clothing 2  -MM     Taking care of personal grooming (such as brushing teeth) 2  -MM     Eating meals 2  -MM     AM-PAC 6 Clicks Score (OT) 9  -MM     Row Name 05/01/23 0858          How much help from another person do you currently need...    Turning from your back to your side while in flat bed without using bedrails? 1  -JE     Moving from lying on back to sitting on the side of a flat bed without bedrails? 2  -JE     Moving to and from a bed to a chair (including a wheelchair)? 1  -JE     Standing up from a chair using your arms (e.g., wheelchair, bedside chair)? 3  -JE     Climbing 3-5 steps with a railing? 1  -JE     To walk in hospital room? 1  -JE     AM-PAC 6 Clicks Score (PT) 9  -JE     Highest level of mobility 3 --> Sat at edge of bed  -JE     Row Name 05/01/23 0858 05/01/23 0854       Functional Assessment    Outcome Measure Options AM-PAC 6 Clicks Basic Mobility (PT)  -JE AM-PAC 6 Clicks Daily Activity (OT)  -MM          User Key  (r) = Recorded By, (t) = Taken By, (c) = Cosigned By    Initials Name Provider Type    Dorian Vasques, OTR/L Occupational Therapist    Niya Shell, PT Physical Therapist                Occupational Therapy Education     Title: PT OT SLP Therapies (In Progress)     Topic: Occupational Therapy (In Progress)     Point: ADL training (In Progress)     Description:   Instruct learner(s) on proper safety adaptation and remediation techniques during self care or transfers.   Instruct in proper use of assistive devices.               Learning Progress Summary           Patient Acceptance, E, NR by MM at 5/1/2023 1625    Comment: OT role, benefits, POC, d/c planning, AAROM to R shoulder   Family Acceptance, E, NR by MM at 5/1/2023 1625    Comment: OT role, benefits, POC, d/c planning, AAROM to R shoulder                   Point: Home exercise program (In Progress)     Description:   Instruct learner(s) on appropriate technique for monitoring, assisting and/or progressing therapeutic exercises/activities.              Learning Progress Summary           Patient Acceptance, E, NR by MM at 5/1/2023 1625    Comment: OT role, benefits, POC, d/c planning, AAROM to R shoulder   Family Acceptance, E, NR by MM at 5/1/2023 1625    Comment: OT role, benefits, POC, d/c planning, AAROM to R shoulder                   Point: Precautions (In Progress)     Description:   Instruct learner(s) on prescribed precautions during self-care and functional transfers.              Learning Progress Summary           Patient Acceptance, E, NR by MM at 5/1/2023 1625    Comment: OT role, benefits, POC, d/c planning, AAROM to R shoulder   Family Acceptance, E, NR by MM at 5/1/2023 1625    Comment: OT role, benefits, POC, d/c planning, AAROM to R shoulder                   Point: Body mechanics (In Progress)     Description:   Instruct learner(s) on proper positioning and spine alignment during self-care, functional mobility activities and/or exercises.              Learning Progress Summary           Patient Acceptance, E, NR by MM at 5/1/2023 1625    Comment: OT role, benefits, POC, d/c planning, AAROM to R shoulder   Family Acceptance, E, NR by MM at 5/1/2023 1625    Comment: OT role, benefits, POC, d/c planning, AAROM to R shoulder                               User Key     Initials Effective Dates Name Provider Type Discipline     06/16/21 -  Dorian Raines, OTR/L Occupational Therapist OT              OT Recommendation and Plan  Planned Therapy Interventions (OT):  "activity tolerance training, adaptive equipment training, BADL retraining, cognitive/visual perception retraining, functional balance retraining, neuromuscular control/coordination retraining, occupation/activity based interventions, passive ROM/stretching, patient/caregiver education/training, ROM/therapeutic exercise, strengthening exercise, transfer/mobility retraining  Therapy Frequency (OT): 5 times/wk  Plan of Care Review  Plan of Care Reviewed With: patient, family  Progress: no change  Outcome Evaluation: OT evaluation completed. Pt is agreeable to therapy. Pt oriented to self and requires verbal cues for time. Disoriented to place and situation. Pt is conversationally confused with decreased insight into deficits. Pt is easily distracted and requires verbal cues for redirection to task at times. Pt reports \"a lot\" of pain in R thigh pre tx, but 2 on garcia baker scale. Pt with noted grimaccing with movement to R shoulder. Per family pt landed on R shoulder during a fall. LUE AROM WFL. Pt is guarded with all shoulder movement to RUE, AAROM completed to 25% shoulder flexion. Pt was 25% impaired in R elbow flexion/extension. R hand WFL. Difficult to formally assess strength but B  4-/5. Pt was dependent to adjust socks. Pt max-dep x2 for bed mobility. Pt was SBA-max for static sitting balance, which fluccuated throughout session. Pt responded well to verbal and tactile cues for static sitting balance. Pt with forward flexed posture. Pt was CGA-min A x2 with RW for sit to stand t/f 2x. Pt with decreased activity tolerance, so further activity deferred. Pt would benefit from skilled OT to address adls, functional mobility and endurance. Recommended d/c SNF for continued therapy.     Time Calculation:    Time Calculation- OT     Row Name 05/01/23 0854             Time Calculation- OT    OT Start Time 0854  -MM      OT Stop Time 1008  -MM      OT Time Calculation (min) 74 min  -MM      Total Timed Code Minutes- " OT 14 minute(s)  -MM      OT Received On 05/01/23  -MM      OT Goal Re-Cert Due Date 05/11/23  -MM         Timed Charges    38488 - OT Self Care/Mgmt Minutes 14  -MM         Total Minutes    Timed Charges Total Minutes 14  -MM       Total Minutes 14  -MM            User Key  (r) = Recorded By, (t) = Taken By, (c) = Cosigned By    Initials Name Provider Type    MM Dorian Raines, OTR/L Occupational Therapist              Therapy Charges for Today     Code Description Service Date Service Provider Modifiers Qty    98568829289 HC OT SELF CARE/MGMT/TRAIN EA 15 MIN 5/1/2023 Dorian Raines OTR/L GO 1    37134860784 HC OT EVAL MOD COMPLEXITY 4 5/1/2023 Dorian Raines OTR/L GO 1               Dorian Raines OTR/MARISSA  5/1/2023

## 2023-05-01 NOTE — PLAN OF CARE
Goal Outcome Evaluation:  Plan of Care Reviewed With: patient         POC EXPLAINED TO PT, PT CONFUSED, BUT VOICES UNDERSTANDING WHEN REORIENTATED. PT DAUGHTERS AT BEDSIDE AND INVOLVED IN CARE. PAIN CONTROLED. NO CHANGES IN SKIN INTEGRITY.LABS AND VS MONITORED. NO DISTRESS.

## 2023-05-01 NOTE — THERAPY EVALUATION
Patient Name: Kamille Saul  : 3/22/1927    MRN: [id number]                              Today's Date: 2023       Admit Date: 2023    Visit Dx:     ICD-10-CM ICD-9-CM   1. Closed displaced fracture of right femoral neck  S72.001A 820.8   2. Impaired functional mobility and activity tolerance  Z74.09 V49.89     Patient Active Problem List   Diagnosis   • Altered mental status   • Diarrhea of presumed infectious origin   • Viral upper respiratory tract infection   • Hypothyroidism (acquired)   • Hyponatremia   • Asymptomatic bacteriuria   • Stage 3 chronic kidney disease (CMS/HCC), declining function   • Dehydration   • Anemia   • Malnutrition   • Transaminitis   • Closed displaced fracture of right femoral neck   • Acute blood loss anemia     Past Medical History:   Diagnosis Date   • Actinic keratosis of scalp    • Anemia    • Arthritis    • Chronic kidney disease, stage III (moderate)    • Eczema    • Fibromyalgia    • Glaucoma     Right eye   • Hx of migraine headaches    • Hypertension    • Hypothyroidism    • Squamous cell carcinoma of scalp      Past Surgical History:   Procedure Laterality Date   • APPENDECTOMY     • CATARACT EXTRACTION     • ENDOSCOPY  2007    mild gastritis otherwise normal upper endoscopy   • HIP HEMIARTHROPLASTY Right 2023    Procedure: HIP HEMIARTHROPLASTY;  Surgeon: Germain Seo MD;  Location: [facility];  Service: Orthopedics;  Laterality: Right;   • HYSTERECTOMY     • SKIN GRAFT     • SKIN LESION EXCISION      cyst removal    • TONSILLECTOMY        General Information     Row Name 23 0858          Physical Therapy Time and Intention    Document Type evaluation;other (see comments)  see MAR  -NATALIE     Mode of Treatment physical therapy  -JE     Row Name 23 0858          General Information    Patient Profile Reviewed yes  -JE     Prior Level of Function independent:;all household mobility;ADL's;dependent:;cooking;cleaning;driving  resides in assisted  living; recent falls  -     Existing Precautions/Restrictions fall;hip, anterior;right  -     Barriers to Rehab medically complex;cognitive status  -     Row Name 05/01/23 0858          Living Environment    People in Home facility resident  -NATALIE     Name(s) of People in Home resides at [facility]  -     Row Name 05/01/23 0858          Home Main Entrance    Number of Stairs, Main Entrance none  -     Row Name 05/01/23 0858          Stairs Within Home, Primary    Number of Stairs, Within Home, Primary none  -     Row Name 05/01/23 0858          Cognition    Orientation Status (Cognition) oriented to;person;verbal cues/prompts needed for orientation;time;disoriented to;place;other (see comments)  agreeable to the current situation  -     Row Name 05/01/23 0858          Safety Issues, Functional Mobility    Safety Issues Affecting Function (Mobility) friction/shear risk;safety precaution awareness  -     Impairments Affecting Function (Mobility) balance;cognition;endurance/activity tolerance;pain;range of motion (ROM);postural/trunk control;shortness of breath;strength  -     Cognitive Impairments, Mobility Safety/Performance safety precaution awareness  -           User Key  (r) = Recorded By, (t) = Taken By, (c) = Cosigned By    Initials Name Provider Type    Niya Shell, PT Physical Therapist               Mobility     Row Name 05/01/23 0858          Bed Mobility    Bed Mobility scooting/bridging;supine-sit;sit-supine  -     Scooting/Bridging [place] (Bed Mobility) dependent (less than 25% patient effort);2 person assist;verbal cues  -NATALIE     Supine-Sit [place] (Bed Mobility) maximum assist (25% patient effort);dependent (less than 25% patient effort);2 person assist;verbal cues  -NATALIE     Sit-Supine [place] (Bed Mobility) dependent (less than 25% patient effort);2 person assist;verbal cues  -NATALIE     Assistive Device (Bed Mobility) bed rails;draw sheet;head of bed elevated   -     Comment, (Bed Mobility) increase time and effort  -     Row Name 05/01/23 0858          Sit-Stand Transfer    Sit-Stand [place] (Transfers) minimum assist (75% patient effort);contact guard;2 person assist;verbal cues  -     Comment, (Sit-Stand Transfer) stood from bed twice w/ noted flexed posture; on second attempt decrease tolerance returning to sitting quickly after standing  -     Row Name 05/01/23 0858          Gait/Stairs (Locomotion)    Distance in Feet (Gait) stood from bedside twice only  -     Row Name 05/01/23 0858          Mobility    Extremity Weight-bearing Status right lower extremity  -     Right Lower Extremity (Weight-bearing Status) weight-bearing as tolerated (WBAT)  -           User Key  (r) = Recorded By, (t) = Taken By, (c) = Cosigned By    Initials Name Provider Type    Niya Shell, PT Physical Therapist               Obj/Interventions     Row Name 05/01/23 0858          Range of Motion Comprehensive    Comment, General Range of Motion gaurded mvmt throughout B LEs and R UE; pt w/ bruising and swelling R shld w/ report of hyperextension w/ recent fall - pt apprehensive to move R shld, but did complete AA partial shld flexion ~ 25%, able to ER R shld to neutral, lacks full elbow extension on R; dressing present R UE due to abrasion w/ noted drainage seeping through; R LE w/ gaurded mvmt w/ ankle DF to neutral only and requires assist to move laterally w/ increase c/os w/ hip knee flexion in sitting; L LE able to move into flexion w/ assist, however gaurded w/ increase effort and assist to complete  -     Row Name 05/01/23 0858          Strength Comprehensive (MMT)    Comment, General Manual Muscle Testing (MMT) Assessment no formal strength assessment due to increase c/os pain w/ required assist for all mobility  -     Row Name 05/01/23 0858          Balance    Balance Assessment sitting static balance;sit to stand dynamic balance;standing static balance   -     Static Sitting Balance standby assist;maximum assist;verbal cues  -     Position, Sitting Balance supported;sitting edge of bed  -     Sit to Stand Dynamic Balance minimal assist;contact guard;2-person assist;verbal cues  -     Static Standing Balance minimal assist;contact guard;2-person assist;verbal cues  -     Position/Device Used, Standing Balance supported;walker, rolling  -     Row Name 05/01/23 0858          Sensory Assessment (Somatosensory)    Sensory Assessment (Somatosensory) unable/difficult to assess  -           User Key  (r) = Recorded By, (t) = Taken By, (c) = Cosigned By    Initials Name Provider Type    Niya Shell, PT Physical Therapist               Goals/Plan     Row Name 05/01/23 0858          Bed Mobility Goal 1 (PT)    Activity/Assistive Device (Bed Mobility Goal 1, PT) rolling to left;scooting;sit to supine/supine to sit  -     [place] Level/Cues Needed (Bed Mobility Goal 1, PT) minimum assist (75% or more patient effort)  -     Time Frame (Bed Mobility Goal 1, PT) long term goal (LTG);10 days  -     Progress/Outcomes (Bed Mobility Goal 1, PT) goal ongoing  -     Row Name 05/01/23 0858          Transfer Goal 1 (PT)    Activity/Assistive Device (Transfer Goal 1, PT) sit-to-stand/stand-to-sit;bed-to-chair/chair-to-bed;walker, rolling  -     [place] Level/Cues Needed (Transfer Goal 1, PT) minimum assist (75% or more patient effort);contact guard required  -     Time Frame (Transfer Goal 1, PT) long term goal (LTG);10 days  -     Progress/Outcome (Transfer Goal 1, PT) goal ongoing  -     Row Name 05/01/23 0858          Gait Training Goal 1 (PT)    Activity/Assistive Device (Gait Training Goal 1, PT) gait (walking locomotion);assistive device use;decrease fall risk;diminish gait deviation;improve balance and speed;increase endurance/gait distance;increase energy conservation;walker, rolling  -     [place] Level (Gait Training Goal 1,  "PT) minimum assist (75% or more patient effort);contact guard required  -     Distance (Gait Training Goal 1, PT) 8-10 ft  -JE     Time Frame (Gait Training Goal 1, PT) long term goal (LTG);10 days  -JE     Progress/Outcome (Gait Training Goal 1, PT) goal ongoing  -     Row Name 05/01/23 0858          ROM Goal 1 (PT)    ROM Goal 1 (PT) pt to perform 10-12 reps of AROM w/ the R LE for hs, hip ab/adduction, SAQ and LAQ  -JE     Time Frame (ROM Goal 1, PT) long-term goal (LTG);10 days  -JE     Progress/Outcome (ROM Goal 1, PT) goal ongoing  -     Row Name 05/01/23 0858          Therapy Assessment/Plan (PT)    Planned Therapy Interventions (PT) balance training;bed mobility training;gait training;home exercise program;patient/family education;postural re-education;ROM (range of motion);strengthening;transfer training;other (see comments)  safety/falls prevention  -           User Key  (r) = Recorded By, (t) = Taken By, (c) = Cosigned By    Initials Name Provider Type    Niya Shell, PT Physical Therapist               Clinical Impression     Row Name 05/01/23 0858          Pain    Pre/Posttreatment Pain Comment \"a lot\"  -     Additional Documentation Pain Scale: FACES Pre/Post-Treatment (Group)  -     Row Name 05/01/23 0858          Pain Scale: FACES Pre/Post-Treatment    Pain: FACES Scale, Pretreatment 2-->hurts little bit  -     Posttreatment Pain Rating 2-->hurts little bit  -     Pain Location - Side/Orientation Right  -     Pain Location lower  -     Pain Location - extremity  -     Row Name 05/01/23 0858          Plan of Care Review    Plan of Care Reviewed With patient;family  -     Progress no change  -     Outcome Evaluation PT eval completed.  Pt pleasant and agreeable to therapy.  Oriented to person, hint/clues for time, disoriented to place and agreeable to situation.  Pt reports pain at R shld and R thigh w/ noted swelling at both w/ bruising and bruising at face.  Pt " gaurded w/ mvmt requiring max dependent assist to move supine to sit.  Decrease sitting balance w/ a posterior lean requiring variable assist from max to SBA.  Pt performed sit to stand w/ min to CGA X2  two different times w/ decrease time tolerance to standing on 2nd attempt.  Noted flexed posture in standing leaning forward on walker.  Pt returned to bed dependent on 2 staff and was dependent to scoot up in bed.  Pt will benefit from continued PT services to restore ROM, improve strength, to assist w/ pain control and edema mgmt, to improve her ability to perform bed mobility and tfers w/ less assist and progress to walking short distances w/ rwx.  Will follow for progress and needs, however based on this visit, feel pt would benefit from stay in SNF for rehab.  -     Row Name 05/01/23 0858          Therapy Assessment/Plan (PT)    Patient/Family Therapy Goals Statement (PT) decrease pain, improved mobility, increase tolerance to activity  -     Rehab Potential (PT) fair, will monitor progress closely  -     Criteria for Skilled Interventions Met (PT) yes;meets criteria;skilled treatment is necessary  -     Therapy Frequency (PT) 2 times/day  -     Predicted Duration of Therapy Intervention (PT) until discharge or goals achieved  -     Row Name 05/01/23 0858          Vital Signs    O2 Delivery Pre Treatment nasal cannula  -     O2 Delivery Intra Treatment nasal cannula  -     O2 Delivery Post Treatment nasal cannula  -     Pre Patient Position Supine  -     Intra Patient Position Standing  -     Post Patient Position Supine  -     Row Name 05/01/23 0858          Positioning and Restraints    Pre-Treatment Position in bed  -     Post Treatment Position bed  -     In Bed notified nsg;fowlers;call light within reach;encouraged to call for assist;exit alarm on;with family/caregiver;side rails up x2;RUE elevated  heels floating  -           User Key  (r) = Recorded By, (t) = Taken By, (c)  = Cosigned By    Initials Name Provider Type    Niya Shell, PT Physical Therapist               Outcome Measures     Row Name 05/01/23 0858          How much help from another person do you currently need...    Turning from your back to your side while in flat bed without using bedrails? 1  -JE     Moving from lying on back to sitting on the side of a flat bed without bedrails? 2  -JE     Moving to and from a bed to a chair (including a wheelchair)? 1  -JE     Standing up from a chair using your arms (e.g., wheelchair, bedside chair)? 3  -JE     Climbing 3-5 steps with a railing? 1  -JE     To walk in hospital room? 1  -     AM-PAC 6 Clicks Score (PT) 9  -JE     Highest level of mobility 3 --> Sat at edge of bed  -NATALIE     Row Name 05/01/23 0858          Functional Assessment    Outcome Measure Options AM-PAC 6 Clicks Basic Mobility (PT)  -           User Key  (r) = Recorded By, (t) = Taken By, (c) = Cosigned By    Initials Name Provider Type    Niya Shell, PT Physical Therapist                             Physical Therapy Education     Title: PT OT SLP Therapies (In Progress)     Topic: Physical Therapy (In Progress)     Point: Mobility training (Done)     Learning Progress Summary           Patient Acceptance, E,SALVADOR SYKES, TALON LONGO,NR by NATALIE at 5/1/2023 1038    Comment: Education re: purpose of PT/importance of activity, for safety/falls prevention, improved tech w/ bed mobility and tfers, proper use of rwx, increase awareness of upright posture and for balance corrections   Family Acceptance, E,SALVADOR SYKES VU, DU,NR by NATALIE at 5/1/2023 1038    Comment: Education re: purpose of PT/importance of activity, for safety/falls prevention, improved tech w/ bed mobility and tfers, proper use of rwx, increase awareness of upright posture and for balance corrections                   Point: Home exercise program (Not Started)     Learner Progress:  Not documented in this visit.          Point: Precautions (Done)      Learning Progress Summary           Patient Acceptance, E,TB,SALVADOR, QING,TALON,NR by NATALIE at 5/1/2023 1038    Comment: Education re: purpose of PT/importance of activity, for safety/falls prevention, improved tech w/ bed mobility and tfers, proper use of rwx, increase awareness of upright posture and for balance corrections   Family Acceptance, E,ONEL,SALVADOR, QING,TALON,NR by NATALIE at 5/1/2023 1038    Comment: Education re: purpose of PT/importance of activity, for safety/falls prevention, improved tech w/ bed mobility and tfers, proper use of rwx, increase awareness of upright posture and for balance corrections                               User Key     Initials Effective Dates Name Provider Type Discipline    NATALIE 08/02/18 -  Niya Cote, PT Physical Therapist PT              PT Recommendation and Plan  Planned Therapy Interventions (PT): balance training, bed mobility training, gait training, home exercise program, patient/family education, postural re-education, ROM (range of motion), strengthening, transfer training, other (see comments) (safety/falls prevention)  Plan of Care Reviewed With: patient, family  Progress: no change  Outcome Evaluation: PT eval completed.  Pt pleasant and agreeable to therapy.  Oriented to person, hint/clues for time, disoriented to place and agreeable to situation.  Pt reports pain at R shld and R thigh w/ noted swelling at both w/ bruising and bruising at face.  Pt gaurded w/ mvmt requiring max dependent assist to move supine to sit.  Decrease sitting balance w/ a posterior lean requiring variable assist from max to SBA.  Pt performed sit to stand w/ min to CGA X2  two different times w/ decrease time tolerance to standing on 2nd attempt.  Noted flexed posture in standing leaning forward on walker.  Pt returned to bed dependent on 2 staff and was dependent to scoot up in bed.  Pt will benefit from continued PT services to restore ROM, improve strength, to assist w/ pain control and edema mgmt, to  improve her ability to perform bed mobility and tfers w/ less assist and progress to walking short distances w/ rwx.  Will follow for progress and needs, however based on this visit, feel pt would benefit from stay in SNF for rehab.     Time Calculation:    PT Charges     Row Name 05/01/23 1036             Time Calculation    Start Time 0858  -      Stop Time 1008  -      Time Calculation (min) 70 min  -      PT Received On 05/01/23  -      PT Goal Re-Cert Due Date 05/11/23  -            User Key  (r) = Recorded By, (t) = Taken By, (c) = Cosigned By    Initials Name Provider Type    Niya Shell, PT Physical Therapist              Therapy Charges for Today     Code Description Service Date Service Provider Modifiers Qty    [id number]  PT EVAL MOD COMPLEXITY 4 5/1/2023 Niya Cote, PT GP 1    [id number]  PT THERAPEUTIC ACT EA 15 MIN 5/1/2023 Niya Cote, PT GP 1          PT G-Codes  Outcome Measure Options: AM-PAC 6 Clicks Basic Mobility (PT)  AM-PAC 6 Clicks Score (PT): 9  PT Discharge Summary  Anticipated Discharge Disposition (PT): skilled nursing facility    Niya Cote PT  5/1/2023

## 2023-05-02 LAB
ANION GAP SERPL CALCULATED.3IONS-SCNC: 6 MMOL/L (ref 5–15)
BUN SERPL-MCNC: 35 MG/DL (ref 8–23)
BUN/CREAT SERPL: 21.1 (ref 7–25)
CALCIUM SPEC-SCNC: 8.4 MG/DL (ref 8.2–9.6)
CHLORIDE SERPL-SCNC: 105 MMOL/L (ref 98–107)
CO2 SERPL-SCNC: 25 MMOL/L (ref 22–29)
CREAT SERPL-MCNC: 1.66 MG/DL (ref 0.57–1)
EGFRCR SERPLBLD CKD-EPI 2021: 28.1 ML/MIN/1.73
GLUCOSE SERPL-MCNC: 130 MG/DL (ref 65–99)
HCT VFR BLD AUTO: 20.7 % (ref 34–46.6)
HCT VFR BLD AUTO: 29.5 % (ref 34–46.6)
HGB BLD-MCNC: 6.3 G/DL (ref 12–15.9)
HGB BLD-MCNC: 9.1 G/DL (ref 12–15.9)
POTASSIUM SERPL-SCNC: 4.5 MMOL/L (ref 3.5–5.2)
QT INTERVAL: 374 MS
QTC INTERVAL: 423 MS
SODIUM SERPL-SCNC: 136 MMOL/L (ref 136–145)

## 2023-05-02 PROCEDURE — 86901 BLOOD TYPING SEROLOGIC RH(D): CPT

## 2023-05-02 PROCEDURE — 86900 BLOOD TYPING SEROLOGIC ABO: CPT

## 2023-05-02 PROCEDURE — 25010000002 ENOXAPARIN PER 10 MG: Performed by: ORTHOPAEDIC SURGERY

## 2023-05-02 PROCEDURE — 85014 HEMATOCRIT: CPT | Performed by: NURSE PRACTITIONER

## 2023-05-02 PROCEDURE — 25010000002 MORPHINE PER 10 MG: Performed by: FAMILY MEDICINE

## 2023-05-02 PROCEDURE — 25010000002 NA FERRIC GLUC CPLX PER 12.5 MG: Performed by: NURSE PRACTITIONER

## 2023-05-02 PROCEDURE — 97535 SELF CARE MNGMENT TRAINING: CPT

## 2023-05-02 PROCEDURE — 97530 THERAPEUTIC ACTIVITIES: CPT

## 2023-05-02 PROCEDURE — 85018 HEMOGLOBIN: CPT | Performed by: INTERNAL MEDICINE

## 2023-05-02 PROCEDURE — 80048 BASIC METABOLIC PNL TOTAL CA: CPT | Performed by: NURSE PRACTITIONER

## 2023-05-02 PROCEDURE — 86920 COMPATIBILITY TEST SPIN: CPT

## 2023-05-02 PROCEDURE — P9016 RBC LEUKOCYTES REDUCED: HCPCS

## 2023-05-02 PROCEDURE — 36430 TRANSFUSION BLD/BLD COMPNT: CPT

## 2023-05-02 PROCEDURE — 85014 HEMATOCRIT: CPT | Performed by: INTERNAL MEDICINE

## 2023-05-02 PROCEDURE — 85018 HEMOGLOBIN: CPT | Performed by: NURSE PRACTITIONER

## 2023-05-02 RX ORDER — PANTOPRAZOLE SODIUM 40 MG/1
40 TABLET, DELAYED RELEASE ORAL DAILY
Status: DISCONTINUED | OUTPATIENT
Start: 2023-05-02 | End: 2023-05-04 | Stop reason: HOSPADM

## 2023-05-02 RX ADMIN — LEVOTHYROXINE SODIUM 50 MCG: 50 TABLET ORAL at 06:13

## 2023-05-02 RX ADMIN — MORPHINE SULFATE 1 MG: 2 INJECTION, SOLUTION INTRAMUSCULAR; INTRAVENOUS at 09:05

## 2023-05-02 RX ADMIN — DOCUSATE SODIUM 50 MG AND SENNOSIDES 8.6 MG 1 TABLET: 8.6; 5 TABLET, FILM COATED ORAL at 09:06

## 2023-05-02 RX ADMIN — LATANOPROST 1 DROP: 50 SOLUTION OPHTHALMIC at 21:45

## 2023-05-02 RX ADMIN — ALLOPURINOL 300 MG: 300 TABLET ORAL at 09:05

## 2023-05-02 RX ADMIN — HYDROCODONE BITARTRATE AND ACETAMINOPHEN 1 TABLET: 5; 325 TABLET ORAL at 21:45

## 2023-05-02 RX ADMIN — BRIMONIDINE TARTRATE 1 DROP: 2 SOLUTION/ DROPS OPHTHALMIC at 09:05

## 2023-05-02 RX ADMIN — TIMOLOL MALEATE 1 DROP: 5 SOLUTION/ DROPS OPHTHALMIC at 09:05

## 2023-05-02 RX ADMIN — SODIUM CHLORIDE, POTASSIUM CHLORIDE, SODIUM LACTATE AND CALCIUM CHLORIDE 50 ML/HR: 600; 310; 30; 20 INJECTION, SOLUTION INTRAVENOUS at 21:44

## 2023-05-02 RX ADMIN — BRIMONIDINE TARTRATE 1 DROP: 2 SOLUTION/ DROPS OPHTHALMIC at 21:44

## 2023-05-02 RX ADMIN — CYCLOSPORINE 1 DROP: 0.5 EMULSION OPHTHALMIC at 09:05

## 2023-05-02 RX ADMIN — SODIUM CHLORIDE 250 MG: 9 INJECTION, SOLUTION INTRAVENOUS at 09:05

## 2023-05-02 RX ADMIN — DOCUSATE SODIUM 50 MG AND SENNOSIDES 8.6 MG 1 TABLET: 8.6; 5 TABLET, FILM COATED ORAL at 21:44

## 2023-05-02 RX ADMIN — ENOXAPARIN SODIUM 30 MG: 100 INJECTION SUBCUTANEOUS at 09:05

## 2023-05-02 RX ADMIN — POLYETHYLENE GLYCOL 3350 17 G: 17 POWDER, FOR SOLUTION ORAL at 09:06

## 2023-05-02 RX ADMIN — CYCLOSPORINE 1 DROP: 0.5 EMULSION OPHTHALMIC at 21:44

## 2023-05-02 RX ADMIN — TIMOLOL MALEATE 1 DROP: 5 SOLUTION/ DROPS OPHTHALMIC at 21:45

## 2023-05-02 RX ADMIN — LISINOPRIL 10 MG: 10 TABLET ORAL at 09:06

## 2023-05-02 RX ADMIN — MORPHINE SULFATE 1 MG: 2 INJECTION, SOLUTION INTRAMUSCULAR; INTRAVENOUS at 03:00

## 2023-05-02 RX ADMIN — Medication 10 ML: at 09:06

## 2023-05-02 NOTE — THERAPY TREATMENT NOTE
Patient Name: Kamille Saul  : 3/22/1927    MRN: [id number]                              Today's Date: 2023       Admit Date: 2023    Visit Dx:     ICD-10-CM ICD-9-CM   1. Closed displaced fracture of right femoral neck  S72.001A 820.8   2. Impaired functional mobility and activity tolerance  Z74.09 V49.89   3. Decreased activities of daily living (ADL)  Z78.9 V49.89     Patient Active Problem List   Diagnosis   • Altered mental status   • Diarrhea of presumed infectious origin   • Viral upper respiratory tract infection   • Hypothyroidism (acquired)   • Hyponatremia   • Asymptomatic bacteriuria   • Stage 3 chronic kidney disease (CMS/HCC), declining function   • Dehydration   • Anemia   • Malnutrition   • Transaminitis   • Closed displaced fracture of right femoral neck   • Acute blood loss anemia     Past Medical History:   Diagnosis Date   • Actinic keratosis of scalp    • Anemia    • Arthritis    • Chronic kidney disease, stage III (moderate)    • Eczema    • Fibromyalgia    • Glaucoma     Right eye   • Hx of migraine headaches    • Hypertension    • Hypothyroidism    • Squamous cell carcinoma of scalp      Past Surgical History:   Procedure Laterality Date   • APPENDECTOMY     • CATARACT EXTRACTION     • ENDOSCOPY  2007    mild gastritis otherwise normal upper endoscopy   • HIP HEMIARTHROPLASTY Right 2023    Procedure: HIP HEMIARTHROPLASTY;  Surgeon: Germain Seo MD;  Location: [facility];  Service: Orthopedics;  Laterality: Right;   • HYSTERECTOMY     • SKIN GRAFT     • SKIN LESION EXCISION      cyst removal    • TONSILLECTOMY        General Information     Row Name 23 0820          OT Time and Intention    Document Type therapy note (daily note)  -LS     Mode of Treatment occupational therapy  -LS     Row Name 23 0820          General Information    Patient Profile Reviewed yes  -LS     Existing Precautions/Restrictions fall;right;hip, anterior;other (see comments)  WBAT  RLE  -     Row Name 05/02/23 0820          Cognition    Orientation Status (Cognition) oriented to;person;place;disoriented to;situation;time  -     Row Name 05/02/23 0820          Safety Issues, Functional Mobility    Safety Issues Affecting Function (Mobility) ability to follow commands;safety precaution awareness;safety precautions follow-through/compliance;friction/shear risk  -     Impairments Affecting Function (Mobility) balance;cognition;coordination;endurance/activity tolerance;pain;strength;range of motion (ROM);postural/trunk control  -     Cognitive Impairments, Mobility Safety/Performance attention;safety precaution awareness;safety precaution follow-through;problem-solving/reasoning  -           User Key  (r) = Recorded By, (t) = Taken By, (c) = Cosigned By    Initials Name Provider Type     Char Byrne OTR/L Occupational Therapist                 Mobility/ADL's     Row Name 05/02/23 0820          Bed Mobility    Bed Mobility supine-sit;sit-supine  -     Scooting/Bridging [place] (Bed Mobility) dependent (less than 25% patient effort);verbal cues;nonverbal cues (demo/gesture)  -     Supine-Sit [place] (Bed Mobility) maximum assist (25% patient effort);verbal cues;nonverbal cues (demo/gesture)  -     Sit-Supine [place] (Bed Mobility) dependent (less than 25% patient effort)  -     Bed Mobility, Safety Issues cognitive deficits limit understanding;decreased use of arms for pushing/pulling;decreased use of legs for bridging/pushing  -     Assistive Device (Bed Mobility) bed rails;draw sheet;head of bed elevated  -     Row Name 05/02/23 0820          Activities of Daily Living    BADL Assessment/Intervention bathing  -     Row Name 05/02/23 0820          Bathing Assessment/Intervention    [place] Level (Bathing) bathing skills;upper body;maximum assist (25% patient effort);lower body;dependent (less than 25% patient effort)  -     Position (Bathing) supine   -           User Key  (r) = Recorded By, (t) = Taken By, (c) = Cosigned By    Initials Name Provider Type    Char Terry, OTR/L Occupational Therapist               Obj/Interventions    No documentation.                Goals/Plan    No documentation.                Clinical Impression     Row Name 05/02/23 0820          Pain Assessment    Pain Intervention(s) Repositioned;Medication (See MAR)  -     Row Name 05/02/23 0820          Pain Scale: FACES Pre/Post-Treatment    Pain: FACES Scale, Pretreatment 2-->hurts little bit  -LS     Posttreatment Pain Rating 2-->hurts little bit  -LS     Pain Location - Side/Orientation Right  -LS     Pain Location - hip  -LS     Row Name 05/02/23 0820          Plan of Care Review    Plan of Care Reviewed With patient;daughter  -LS     Outcome Evaluation OT tx completed. Pt in fowlers upon therapist arrival; A&O to person and place only; Pt's daughter also present. Pt performed supine>sit utilizing bedrails with HOB elevated with Max A and verbal/visual/tactile cues for positioning and sequencing. Pt required Min-Mod A to maintain static sitting balance at EOB. Pt provided with PROM to R shoulder to decreased stiffness and increase ROM; Pt tolerated well and demonstrated increased PROM without pain after. Pt dependent for sit>supine. Pt performed UB sponge bathing at bed level requiring Max A; Pt dependent for LB sponge bathing. Pt continues to benefit from skilled OT intervention in order to address remaining deficits in fxl mobility, fxl activity tolerance, balance, coordination, strength, and use of adaptive techniques/equipment. Recommend SNF at discharge.  -     Row Name 05/02/23 0820          Therapy Plan Review/Discharge Plan (OT)    Anticipated Discharge Disposition (OT) skilled nursing facility  -     Row Name 05/02/23 0820          Positioning and Restraints    Pre-Treatment Position in bed  -LS     Post Treatment Position bed  -LS     In Bed fowlers;with  family/caregiver;side rails up x2;call light within reach;encouraged to call for assist  -LS           User Key  (r) = Recorded By, (t) = Taken By, (c) = Cosigned By    Initials Name Provider Type    Char Terry OTR/L Occupational Therapist               Outcome Measures     Row Name 05/02/23 0820          How much help from another is currently needed...    Putting on and taking off regular lower body clothing? 1  -LS     Bathing (including washing, rinsing, and drying) 2  -LS     Toileting (which includes using toilet bed pan or urinal) 1  -LS     Putting on and taking off regular upper body clothing 2  -LS     Taking care of personal grooming (such as brushing teeth) 2  -LS     Eating meals 2  -LS     AM-PAC 6 Clicks Score (OT) 10  -LS     Row Name 05/02/23 0820          Functional Assessment    Outcome Measure Options AM-PAC 6 Clicks Daily Activity (OT)  -LS           User Key  (r) = Recorded By, (t) = Taken By, (c) = Cosigned By    Initials Name Provider Type    Char Terry OTR/L Occupational Therapist                Occupational Therapy Education     Title: PT OT SLP Therapies (In Progress)     Topic: Occupational Therapy (In Progress)     Point: ADL training (In Progress)     Description:   Instruct learner(s) on proper safety adaptation and remediation techniques during self care or transfers.   Instruct in proper use of assistive devices.              Learning Progress Summary           Patient Acceptance, E,D, NL,NR by LS at 5/2/2023 0900    Acceptance, E, NR by MM at 5/1/2023 1625    Comment: OT role, benefits, POC, d/c planning, AAROM to R shoulder   Family Acceptance, E, NR by MM at 5/1/2023 1625    Comment: OT role, benefits, POC, d/c planning, AAROM to R shoulder                   Point: Home exercise program (In Progress)     Description:   Instruct learner(s) on appropriate technique for monitoring, assisting and/or progressing therapeutic exercises/activities.              Learning  Progress Summary           Patient Acceptance, E, NR by MM at 5/1/2023 1625    Comment: OT role, benefits, POC, d/c planning, AAROM to R shoulder   Family Acceptance, E, NR by MM at 5/1/2023 1625    Comment: OT role, benefits, POC, d/c planning, AAROM to R shoulder                   Point: Precautions (In Progress)     Description:   Instruct learner(s) on prescribed precautions during self-care and functional transfers.              Learning Progress Summary           Patient Acceptance, E,D, NL,NR by LS at 5/2/2023 0900    Acceptance, E, NR by MM at 5/1/2023 1625    Comment: OT role, benefits, POC, d/c planning, AAROM to R shoulder   Family Acceptance, E, NR by MM at 5/1/2023 1625    Comment: OT role, benefits, POC, d/c planning, AAROM to R shoulder                   Point: Body mechanics (In Progress)     Description:   Instruct learner(s) on proper positioning and spine alignment during self-care, functional mobility activities and/or exercises.              Learning Progress Summary           Patient Acceptance, E,D, NL,NR by LS at 5/2/2023 0900    Acceptance, E, NR by MM at 5/1/2023 1625    Comment: OT role, benefits, POC, d/c planning, AAROM to R shoulder   Family Acceptance, E, NR by MM at 5/1/2023 1625    Comment: OT role, benefits, POC, d/c planning, AAROM to R shoulder                               User Key     Initials Effective Dates Name Provider Type Discipline     06/16/21 -  Dorian Raines, OTR/L Occupational Therapist OT     06/20/22 -  Char Byrne OTR/L Occupational Therapist OT              OT Recommendation and Plan     Plan of Care Review  Plan of Care Reviewed With: patient, daughter  Outcome Evaluation: OT tx completed. Pt in fowlers upon therapist arrival; A&O to person and place only; Pt's daughter also present. Pt performed supine>sit utilizing bedrails with HOB elevated with Max A and verbal/visual/tactile cues for positioning and sequencing. Pt required Min-Mod A to maintain  static sitting balance at EOB. Pt provided with PROM to R shoulder to decreased stiffness and increase ROM; Pt tolerated well and demonstrated increased PROM without pain after. Pt dependent for sit>supine. Pt performed UB sponge bathing at bed level requiring Max A; Pt dependent for LB sponge bathing. Pt continues to benefit from skilled OT intervention in order to address remaining deficits in fxl mobility, fxl activity tolerance, balance, coordination, strength, and use of adaptive techniques/equipment. Recommend SNF at discharge.     Time Calculation:    Time Calculation- OT     Row Name 05/02/23 0820             Time Calculation- OT    OT Start Time 0820  -LS      OT Stop Time 0848  -      OT Time Calculation (min) 28 min  -      Total Timed Code Minutes- OT 28 minute(s)  -      OT Received On 05/02/23  -            User Key  (r) = Recorded By, (t) = Taken By, (c) = Cosigned By    Initials Name Provider Type    LS Char Byrne OTR/L Occupational Therapist              Therapy Charges for Today     Code Description Service Date Service Provider Modifiers Qty    [id number]  OT SELF CARE/MGMT/TRAIN EA 15 MIN 5/2/2023 Char Byrne OTR/L GO 2               Char Byrne OTR/MARISSA  5/2/2023

## 2023-05-02 NOTE — PLAN OF CARE
Goal Outcome Evaluation:  Plan of Care Reviewed With: patient, daughter           Outcome Evaluation: OT tx completed. Pt in fowlers upon therapist arrival; A&O to person and place only; Pt's daughter also present. Pt performed supine>sit utilizing bedrails with HOB elevated with Max A and verbal/visual/tactile cues for positioning and sequencing. Pt required Min-Mod A to maintain static sitting balance at EOB. Pt provided with PROM to R shoulder to decreased stiffness and increase ROM; Pt tolerated well and demonstrated increased PROM without pain after. Pt dependent for sit>supine. Pt performed UB sponge bathing at bed level requiring Max A; Pt dependent for LB sponge bathing. Pt continues to benefit from skilled OT intervention in order to address remaining deficits in fxl mobility, fxl activity tolerance, balance, coordination, strength, and use of adaptive techniques/equipment. Recommend SNF at discharge.

## 2023-05-02 NOTE — CASE MANAGEMENT/SOCIAL WORK
Continued Stay Note  [facility]     Patient Name: Kamille Saul  MRN: [id number]  Today's Date: 5/2/2023    Admit Date: 4/29/2023        Discharge Plan     Row Name 05/02/23 1155       Plan    Plan Comments PT's daughter has called back and advised that they would like PT listed with [facility]. SW has made a referral. Will await bed offer or denial.    Row Name 05/02/23 6890       Plan    Plan Comments Spoke with PT's daughter, Leanne, she and her sister have been discussing where to list. Leanne has advised that she needs to check in with her sister and will call SW back with where they would like to list. SW awaiting return call.               Discharge Codes    No documentation.               Expected Discharge Date and Time     Expected Discharge Date Expected Discharge Time    May 3, 2023             RALF Thurman

## 2023-05-02 NOTE — CASE MANAGEMENT/SOCIAL WORK
Continued Stay Note   [place]     Patient Name: Kamille Saul  MRN: [id number]  Today's Date: 5/2/2023    Admit Date: 4/29/2023        Discharge Plan     Row Name 05/02/23 1107       Plan    Plan Comments Spoke with PT's daughter, Leanne, she and her sister have been discussing where to list. Leanne has advised that she needs to check in with her sister and will call SW back with where they would like to list. SW awaiting return call.               Discharge Codes    No documentation.               Expected Discharge Date and Time     Expected Discharge Date Expected Discharge Time    May 3, 2023             RALF Thurman

## 2023-05-02 NOTE — PLAN OF CARE
Goal Outcome Evaluation:  Plan of Care Reviewed With: patient, daughter        Progress: improving     NO CHANGE IN SKIN INTEGRITY. NO INJURIES/FALLS. NO S/SX OF INFECTION. VSS. NO DISTRESS OR COMPLAINTS .

## 2023-05-02 NOTE — PROGRESS NOTES
Pharmacy Dosing Service  Automatic IV to PO Conversion  Pantoprazole    Assessment/Action/Plan:  Patient meets criteria listed below. pantoprazole 40 mg IV every 24 hours has been changed to pantoprazole 40 mg PO every 24 hours.     Subjective:  Kamille Saul is a 89 y.o. female who meets the following criteria for IV to PO therapy conversion     Policy Criteria:  Tolerating oral fluids or 40ml/hour of enteral nutrition and oral route not otherwise compromised  Receiving other oral medications on a scheduled basis    Additional Factors Considered:  Anti-emetic usage  Patient disposition per documentation  Disease states or conditions contraindicating oral conversion    Objective:  Diet Order   Procedures    Diet: Regular/House Diet; Texture: Regular Texture (IDDSI 7); Fluid Consistency: Thin (IDDSI 0)       Active Medications    Current Facility-Administered Medications:     allopurinol (ZYLOPRIM) tablet 300 mg, 300 mg, Oral, Daily, Jun Bennett MD, 300 mg at 05/01/23 1006    bisacodyl (DULCOLAX) suppository 10 mg, 10 mg, Rectal, Daily PRN, Kaylin Ibarra APRN    brimonidine (ALPHAGAN) 0.2 % ophthalmic solution 1 drop, 1 drop, Both Eyes, BID, 1 drop at 05/01/23 2143 **AND** timolol (TIMOPTIC) 0.5 % ophthalmic solution 1 drop, 1 drop, Both Eyes, BID, Jun Bennett MD, 1 drop at 05/01/23 2143    cycloSPORINE (RESTASIS) 0.05 % ophthalmic emulsion 1 drop, 1 drop, Both Eyes, BID, Jun Bennett MD, 1 drop at 05/01/23 2144    Enoxaparin Sodium (LOVENOX) syringe 30 mg, 30 mg, Subcutaneous, Q24H, Germain Seo MD    ferric gluconate (FERRLECIT) 250 mg in sodium chloride 0.9 % 120 mL IVPB, 250 mg, Intravenous, Daily, Kaylin Ibarra APRN, Last Rate: 60 mL/hr at 05/01/23 1431, 250 mg at 05/01/23 1431    HYDROcodone-acetaminophen (NORCO) 5-325 MG per tablet 1 tablet, 1 tablet, Oral, Q4H PRN, Jun Bennett MD, 1 tablet at 04/30/23 1522    [MAR Hold] ipratropium-albuterol (DUO-NEB) nebulizer solution 3 mL, 3 mL,  Nebulization, Q6H PRN, Jun Bennett MD    lactated ringers infusion, 50 mL/hr, Intravenous, Continuous, Kaylin Ibarra APRN, Last Rate: 50 mL/hr at 05/01/23 2210, 50 mL/hr at 05/01/23 2210    latanoprost (XALATAN) 0.005 % ophthalmic solution 1 drop, 1 drop, Both Eyes, Nightly, Jun Bennett MD, 1 drop at 05/01/23 2143    levothyroxine (SYNTHROID, LEVOTHROID) tablet 50 mcg, 50 mcg, Oral, Q AM, Jun Bennett MD, 50 mcg at 05/02/23 0613    lisinopril (PRINIVIL,ZESTRIL) tablet 10 mg, 10 mg, Oral, Q24H, Jun Bennett MD, 10 mg at 05/01/23 1005    Morphine sulfate (PF) injection 1 mg, 1 mg, Intravenous, Q4H PRN, Jun Bennett MD, 1 mg at 05/02/23 0300    ondansetron (ZOFRAN) injection 4 mg, 4 mg, Intravenous, Q6H PRN, Jun Bennett MD, 4 mg at 04/29/23 2050    pantoprazole (PROTONIX) EC tablet 40 mg, 40 mg, Oral, Daily, Bryan Piedra DO    polyethylene glycol (MIRALAX) packet 17 g, 17 g, Oral, Daily, Kaylin Ibarra APRN, 17 g at 05/01/23 2144    sennosides-docusate (PERICOLACE) 8.6-50 MG per tablet 1 tablet, 1 tablet, Oral, BID, Kaylin Ibarra APRN, 1 tablet at 05/01/23 2144    sodium chloride 0.9 % flush 10 mL, 10 mL, Intravenous, Q12H, Jun Bennett MD, 10 mL at 05/01/23 2144    sodium chloride 0.9 % flush 10 mL, 10 mL, Intravenous, PRN, Jun Bennett MD    sodium chloride 0.9 % infusion 40 mL, 40 mL, Intravenous, PRN, Jun Bennett MD Meredith A Johnson, PharmD  05/02/2309:04 CDT

## 2023-05-02 NOTE — THERAPY TREATMENT NOTE
Acute Care - Physical Therapy Treatment Note  [facility]     Patient Name: Kamille Saul  : 3/22/1927  MRN: [id number]  Today's Date: 2023      Visit Dx:     ICD-10-CM ICD-9-CM   1. Closed displaced fracture of right femoral neck  S72.001A 820.8   2. Impaired functional mobility and activity tolerance  Z74.09 V49.89   3. Decreased activities of daily living (ADL)  Z78.9 V49.89     Patient Active Problem List   Diagnosis   • Altered mental status   • Diarrhea of presumed infectious origin   • Viral upper respiratory tract infection   • Hypothyroidism (acquired)   • Hyponatremia   • Asymptomatic bacteriuria   • Stage 3 chronic kidney disease (CMS/HCC), declining function   • Dehydration   • Anemia   • Malnutrition   • Transaminitis   • Closed displaced fracture of right femoral neck   • Acute blood loss anemia     Past Medical History:   Diagnosis Date   • Actinic keratosis of scalp    • Anemia    • Arthritis    • Chronic kidney disease, stage III (moderate)    • Eczema    • Fibromyalgia    • Glaucoma     Right eye   • Hx of migraine headaches    • Hypertension    • Hypothyroidism    • Squamous cell carcinoma of scalp      Past Surgical History:   Procedure Laterality Date   • APPENDECTOMY     • CATARACT EXTRACTION     • ENDOSCOPY  2007    mild gastritis otherwise normal upper endoscopy   • HIP HEMIARTHROPLASTY Right 2023    Procedure: HIP HEMIARTHROPLASTY;  Surgeon: Germain Seo MD;  Location: [facility];  Service: Orthopedics;  Laterality: Right;   • HYSTERECTOMY     • SKIN GRAFT     • SKIN LESION EXCISION      cyst removal    • TONSILLECTOMY       PT Assessment (last 12 hours)     PT Evaluation and Treatment     Row Name 23 1340 23 1054       Physical Therapy Time and Intention    Subjective Information complains of;pain  -KJ --    Document Type therapy note (daily note)  -KJ --    Mode of Treatment physical therapy  -KJ physical therapy  -KJ    Patient Effort good  -KJ --    Comment  -- had difficulty waking pt; she kept going back to sleep. will check on her after lunch  -KJ    Row Name 05/02/23 1340          General Information    Existing Precautions/Restrictions fall;hip, anterior;right  WBAT  -KJ     Row Name 05/02/23 1340          Pain    Pretreatment Pain Rating 2/10  -KJ     Posttreatment Pain Rating 9/10  -KJ     Pain Location - Side/Orientation Right  -KJ     Pain Location - hip  -KJ     Row Name 05/02/23 1340          Mobility    Extremity Weight-bearing Status right lower extremity  -KJ     Right Lower Extremity (Weight-bearing Status) weight-bearing as tolerated (WBAT)  -KJ     Row Name 05/02/23 1340          Bed Mobility    Supine-Sit Dixon (Bed Mobility) verbal cues;maximum assist (25% patient effort);2 person assist  -KJ     Sit-Supine Dixon (Bed Mobility) dependent (less than 25% patient effort);2 person assist  -KJ     Row Name 05/02/23 1340          Sit-Stand Transfer    Sit-Stand Dixon (Transfers) verbal cues;minimum assist (75% patient effort);moderate assist (50% patient effort);2 person assist  -KJ     Assistive Device (Sit-Stand Transfers) walker, front-wheeled  -KJ     Row Name 05/02/23 1340          Stand-Sit Transfer    Stand-Sit Dixon (Transfers) maximum assist (25% patient effort);2 person assist  -KJ     Assistive Device (Stand-Sit Transfers) walker, front-wheeled  -KJ     Row Name 05/02/23 1340          Gait/Stairs (Locomotion)    Dixon Level (Gait) verbal cues;minimum assist (75% patient effort);moderate assist (50% patient effort);2 person assist  -KJ     Assistive Device (Gait) walker, front-wheeled  -JULIETTE     Distance in Feet (Gait) pivot to bedside commode; then stood x 2 more times  -KJ     Row Name 05/02/23 1340          Motor Skills    Therapeutic Exercise aerobic  -KJ     Row Name 05/02/23 1340          Aerobic Exercise    Comment, Aerobic Exercise (Therapeutic Exercise) AAROM  -KJ     Row Name             Wound 04/30/23  1200 Right hip Incision    Wound - Properties Group Placement Date: 04/30/23  -SB Placement Time: 1200  -SB Present on Hospital Admission: N  -SB Side: Right  -SB Location: hip  -SB Primary Wound Type: Incision  -SB    Retired Wound - Properties Group Placement Date: 04/30/23  -SB Placement Time: 1200  -SB Present on Hospital Admission: N  -SB Side: Right  -SB Location: hip  -SB Primary Wound Type: Incision  -SB    Retired Wound - Properties Group Date first assessed: 04/30/23  -SB Time first assessed: 1200  -SB Present on Hospital Admission: N  -SB Side: Right  -SB Location: hip  -SB Primary Wound Type: Incision  -SB    Row Name 05/02/23 1340          Positioning and Restraints    Pre-Treatment Position in bed  -KJ     Post Treatment Position bed  -KJ     In Bed call light within reach;exit alarm on  -KJ           User Key  (r) = Recorded By, (t) = Taken By, (c) = Cosigned By    Initials Name Provider Type    Ashley Monet PTA Physical Therapist Assistant    Radha Egan RN Registered Nurse                Physical Therapy Education     Title: PT OT SLP Therapies (In Progress)     Topic: Physical Therapy (In Progress)     Point: Mobility training (Done)     Learning Progress Summary           Patient Acceptance, E,TB,SALVADOR, TALON LONGO,NR by NATALIE at 5/1/2023 1038    Comment: Education re: purpose of PT/importance of activity, for safety/falls prevention, improved tech w/ bed mobility and tfers, proper use of rwx, increase awareness of upright posture and for balance corrections   Family Acceptance, E,TB,SALVADOR, QING,TALON,NR by NATALIE at 5/1/2023 1038    Comment: Education re: purpose of PT/importance of activity, for safety/falls prevention, improved tech w/ bed mobility and tfers, proper use of rwx, increase awareness of upright posture and for balance corrections                   Point: Home exercise program (Not Started)     Learner Progress:  Not documented in this visit.          Point: Precautions (Done)     Learning  Progress Summary           Patient Acceptance, E,TB,SALVADOR, QING,TALON,NR by NATALIE at 5/1/2023 1038    Comment: Education re: purpose of PT/importance of activity, for safety/falls prevention, improved tech w/ bed mobility and tfers, proper use of rwx, increase awareness of upright posture and for balance corrections   Family Acceptance, E,TB,SALVADOR, QING,TALON,NR by NATALIE at 5/1/2023 1038    Comment: Education re: purpose of PT/importance of activity, for safety/falls prevention, improved tech w/ bed mobility and tfers, proper use of rwx, increase awareness of upright posture and for balance corrections                               User Key     Initials Effective Dates Name Provider Type Discipline     08/02/18 -  Niya Cote, PT Physical Therapist PT              PT Recommendation and Plan     Plan of Care Reviewed With: patient  Progress: improving  Outcome Evaluation: PT tx completed. Pt c/o pn R hip with mobility, rates 9/10. Requires MaxA x 2 bed mobility, sit EOB x 15 mintues Min/Cg. Sit<>stand utilizing rwx, Renu x 2 to stand and able assist in weight shifting to transfer to BSC. Stood x 2 at bedside Min/ModA of 2. Recommend SNF for contd   Outcome Measures     Row Name 05/02/23 1400             How much help from another person do you currently need...    Turning from your back to your side while in flat bed without using bedrails? 1  -KJ      Moving from lying on back to sitting on the side of a flat bed without bedrails? 1  -KJ      Moving to and from a bed to a chair (including a wheelchair)? 2  -KJ      Standing up from a chair using your arms (e.g., wheelchair, bedside chair)? 2  -KJ      Climbing 3-5 steps with a railing? 1  -KJ      To walk in hospital room? 2  -KJ      AM-PAC 6 Clicks Score (PT) 9  -KJ         Functional Assessment    Outcome Measure Options AM-PAC 6 Clicks Basic Mobility (PT)  -KJ            User Key  (r) = Recorded By, (t) = Taken By, (c) = Cosigned By    Initials Name Provider Type    JULIETTE Michaels  Ashley ANN PTA Physical Therapist Assistant                 Time Calculation:    PT Charges     Row Name 05/02/23 1414             Time Calculation    Start Time 1340  -KJ      Stop Time 1410  -KJ      Time Calculation (min) 30 min  -KJ      PT Received On 05/02/23  -KJ      PT Goal Re-Cert Due Date 05/11/23  -KJ            User Key  (r) = Recorded By, (t) = Taken By, (c) = Cosigned By    Initials Name Provider Type    Ashley Monet PTA Physical Therapist Assistant              Therapy Charges for Today     Code Description Service Date Service Provider Modifiers Qty    99882356418 HC PT THERAPEUTIC ACT EA 15 MIN 5/1/2023 Ashley Michaels PTA GP 2    71792432058 HC PT THERAPEUTIC ACT EA 15 MIN 5/2/2023 Ashley Michaels, DAVIN GP 2          PT G-Codes  Outcome Measure Options: AM-PAC 6 Clicks Basic Mobility (PT)  AM-PAC 6 Clicks Score (PT): 9  AM-PAC 6 Clicks Score (OT): 10    Ashley Michaels PTA  5/2/2023

## 2023-05-02 NOTE — PROGRESS NOTES
[facility] Medicine Services  INPATIENT PROGRESS NOTE    Patient Name: Kamille Saul  Date of Admission: 4/29/2023  Today's Date: 05/02/23  Length of Stay: 3  Primary Care Physician: Shelley Tidwell MD    Subjective   Chief Complaint: Right hip pain  HPI   Ms. Saul is a 89-year-old female that presented to [facility] after a fall landing on her right hip on Thursday 4/27.  She had pain but was able to bear weight.  She was evaluated in the emergency department at that time and had a CT head, CT spine, x-ray of the pelvis and shoulder.  She has had increased difficulty walking with her walker and complained of worsening right hip pain.  Work-up in the ER revealed a displaced transcervical femoral neck fracture.  CT head negative for acute intracranial normality.  Right periorbital and right frontal scalp soft tissue swelling with no fracture.    Up in bed with son-in-law at bedside.  Hemoglobin 6.3 today, she received 1 unit packed red blood cell.  No signs of active bleeding.      Still somewhat drowsy awakens easily to name. She was able to eat some of her breakfast.  Have asked nurse Quita to try pain pill instead of morphine.  She was able to stand at the edge of the bed with therapy.    Review of Systems   All pertinent negatives and positives are as above. All other systems have been reviewed and are negative unless otherwise stated.     Objective    Temp:  [98 °F (36.7 °C)-98.9 °F (37.2 °C)] 98.9 °F (37.2 °C)  Heart Rate:  [] 94  Resp:  [16-18] 18  BP: ()/(55-91) 155/58  Physical Exam  Vitals reviewed.   Constitutional:       General: She is not in acute distress.     Appearance: She is ill-appearing. She is not toxic-appearing.      Interventions: Nasal cannula in place.      Comments: Sitting up in bed.  No acute distress.  On 2 L nasal cannula.  Son-in-law at bedside.   HENT:      Head: Normocephalic and atraumatic.      Comments: bruising around  eyes     Mouth/Throat:      Mouth: Mucous membranes are moist.      Pharynx: Oropharynx is clear.   Eyes:      Extraocular Movements: Extraocular movements intact.      Conjunctiva/sclera: Conjunctivae normal.      Pupils: Pupils are equal, round, and reactive to light.   Cardiovascular:      Rate and Rhythm: Normal rate and regular rhythm.      Pulses: Normal pulses.   Pulmonary:      Effort: Pulmonary effort is normal. No respiratory distress.      Breath sounds: Normal breath sounds. No wheezing.   Abdominal:      General: Bowel sounds are normal. There is no distension.      Palpations: Abdomen is soft.      Tenderness: There is no abdominal tenderness.   Musculoskeletal:         General: No swelling or tenderness. Normal range of motion.      Cervical back: Normal range of motion and neck supple. No muscular tenderness.      Comments: Dressing to right hip clean/dry/intact with periwound bruising   Skin:     General: Skin is warm and dry.      Coloration: Skin is pale.      Findings: Bruising present.   Neurological:      General: No focal deficit present.      Mental Status: She is alert and oriented to person, place, and time.      Cranial Nerves: No cranial nerve deficit.      Motor: No weakness.   Psychiatric:         Mood and Affect: Mood normal.         Behavior: Behavior normal.       Results Review:  I have reviewed the labs, radiology results, and diagnostic studies.    Laboratory Data:   Results from last 7 days   Lab Units 05/02/23  0415 05/01/23  1243 05/01/23 0527 04/30/23  0330 04/29/23  1510   WBC 10*3/mm3  --   --  11.32* 11.52* 14.35*   HEMOGLOBIN g/dL 6.3* 7.5* 7.1* 8.0* 11.2*   HEMATOCRIT % 20.7* 24.9* 23.8* 26.4* 36.3   PLATELETS 10*3/mm3  --   --  220 232 281        Results from last 7 days   Lab Units 05/02/23  0415 05/01/23 0527 04/30/23  0330 04/29/23  1510   SODIUM mmol/L 136 137 137 137   POTASSIUM mmol/L 4.5 4.5 4.5 4.8   CHLORIDE mmol/L 105 104 104 101   CO2 mmol/L 25.0 24.0 23.0  23.0   BUN mg/dL 35* 37* 39* 40*   CREATININE mg/dL 1.66* 1.65* 1.73* 1.66*   CALCIUM mg/dL 8.4 8.5 8.6 9.4   BILIRUBIN mg/dL  --   --  0.3 0.4   ALK PHOS U/L  --   --  69 87   ALT (SGPT) U/L  --   --  10 14   AST (SGOT) U/L  --   --  15 24   GLUCOSE mg/dL 130* 128* 114* 118*       Culture Data:   Microbiology Results (last 10 days)     ** No results found for the last 240 hours. **        Radiology Data:   Imaging Results (Last 24 Hours)     ** No results found for the last 24 hours. **        I have reviewed the patient's current medications.     Assessment/Plan   Assessment  Active Hospital Problems    Diagnosis    • **Closed displaced fracture of right femoral neck    • Acute blood loss anemia    • Stage 3 chronic kidney disease (CMS/HCC), declining function    • Hypothyroidism (acquired)        Treatment Plan  Orthopedics, Dr. Seo consulted.  She underwent right cemented hip hemiarthroplasty on 4/30/2023 per Dr. Seo.  Weightbearing as tolerated to right lower extremity.    Acute blood loss anemia.  Hemoglobin 11.2 on admission.  Hemoglobin has slowly down trended to 6.3.  Estimated blood loss during surgery 200 mL.  No signs of active bleeding.  Transfuse 1 unit packed red blood cell.  Recheck H&H posttransfusion.  Iron panel reviewed, IV iron x2 doses.    Incentive spirometer.    Bowel regimen.    Lovenox for VTE prophylaxis.    PT/OT.    Medical Decision Making  Number and Complexity of problems: 2 acute problems in the form of closed displaced fracture of right femoral neck with need for surgical intervention and acute blood loss anemia  Differential Diagnosis: None considered at present    Conditions and Status        Condition is unchanged.     [facility] Data  External documents reviewed: Prior [facility] records  Cardiac tracing (EKG, telemetry) interpretation: Normal sinus rhythm  Radiology interpretation: Interpreted by radiology  Labs reviewed: As above  Any tests that were considered but not ordered: None  considered at present     Decision rules/scores evaluated (example [id number], Wells, etc): None considered at present     Discussed with: Patient and Dr. Piedra     Care Planning  Shared decision making: Patient is agreeable to ongoing work-up and treatment  Code status and discussions: No support with limited support to include no intubation and no cardioversion    Disposition  Social Determinants of Health that impact treatment or disposition: Likely needs skilled nursing facility placement -family requests [facility].  I expect the patient to be discharged to skilled nursing facility placement in 1-2 days.     Electronically signed by CLARK Israel, 05/02/23, 11:20 CDT.

## 2023-05-02 NOTE — PLAN OF CARE
Goal Outcome Evaluation:  Plan of Care Reviewed With: patient        Progress: improving  Outcome Evaluation: PT tx completed. Pt c/o pn R hip with mobility, rates 9/10. Requires MaxA x 2 bed mobility, sit EOB x 15 mintues Min/Cg. Sit<>stand utilizing rwx, Renu x 2 to stand and able assist in weight shifting to transfer to BSC. Stood x 2 at bedside Min/ModA of 2. Recommend SNF for contd

## 2023-05-03 VITALS
HEART RATE: 88 BPM | BODY MASS INDEX: 23.03 KG/M2 | SYSTOLIC BLOOD PRESSURE: 174 MMHG | DIASTOLIC BLOOD PRESSURE: 84 MMHG | OXYGEN SATURATION: 92 % | TEMPERATURE: 98.1 F | WEIGHT: 122 LBS | HEIGHT: 61 IN | RESPIRATION RATE: 16 BRPM

## 2023-05-03 LAB
BH BB BLOOD EXPIRATION DATE: NORMAL
BH BB BLOOD TYPE BARCODE: 5100
BH BB DISPENSE STATUS: NORMAL
BH BB PRODUCT CODE: NORMAL
BH BB UNIT NUMBER: NORMAL
CROSSMATCH INTERPRETATION: NORMAL
HCT VFR BLD AUTO: 25 % (ref 34–46.6)
HGB BLD-MCNC: 7.7 G/DL (ref 12–15.9)
UNIT  ABO: NORMAL
UNIT  RH: NORMAL

## 2023-05-03 PROCEDURE — 97535 SELF CARE MNGMENT TRAINING: CPT

## 2023-05-03 PROCEDURE — 85014 HEMATOCRIT: CPT | Performed by: NURSE PRACTITIONER

## 2023-05-03 PROCEDURE — 25010000002 ENOXAPARIN PER 10 MG: Performed by: ORTHOPAEDIC SURGERY

## 2023-05-03 PROCEDURE — 97110 THERAPEUTIC EXERCISES: CPT

## 2023-05-03 PROCEDURE — 97530 THERAPEUTIC ACTIVITIES: CPT

## 2023-05-03 PROCEDURE — 94799 UNLISTED PULMONARY SVC/PX: CPT

## 2023-05-03 PROCEDURE — 85018 HEMOGLOBIN: CPT | Performed by: NURSE PRACTITIONER

## 2023-05-03 RX ORDER — POLYETHYLENE GLYCOL 3350 17 G/17G
17 POWDER, FOR SOLUTION ORAL DAILY
Start: 2023-05-04

## 2023-05-03 RX ORDER — BISACODYL 10 MG
10 SUPPOSITORY, RECTAL RECTAL DAILY PRN
Start: 2023-05-03

## 2023-05-03 RX ORDER — DOCUSATE SODIUM 100 MG/1
100 CAPSULE, LIQUID FILLED ORAL 2 TIMES DAILY
Status: DISCONTINUED | OUTPATIENT
Start: 2023-05-03 | End: 2023-05-04 | Stop reason: HOSPADM

## 2023-05-03 RX ORDER — AMOXICILLIN 250 MG
1 CAPSULE ORAL 2 TIMES DAILY
Start: 2023-05-03

## 2023-05-03 RX ORDER — LACTULOSE 20 G/30ML
20 SOLUTION ORAL ONCE
Status: COMPLETED | OUTPATIENT
Start: 2023-05-03 | End: 2023-05-03

## 2023-05-03 RX ORDER — HYDROCODONE BITARTRATE AND ACETAMINOPHEN 5; 325 MG/1; MG/1
1 TABLET ORAL EVERY 8 HOURS PRN
Qty: 3 TABLET | Refills: 0 | Status: SHIPPED | OUTPATIENT
Start: 2023-05-03

## 2023-05-03 RX ADMIN — CYCLOSPORINE 1 DROP: 0.5 EMULSION OPHTHALMIC at 21:23

## 2023-05-03 RX ADMIN — BRIMONIDINE TARTRATE 1 DROP: 2 SOLUTION/ DROPS OPHTHALMIC at 21:23

## 2023-05-03 RX ADMIN — DOCUSATE SODIUM 100 MG: 100 CAPSULE ORAL at 15:25

## 2023-05-03 RX ADMIN — BRIMONIDINE TARTRATE 1 DROP: 2 SOLUTION/ DROPS OPHTHALMIC at 08:31

## 2023-05-03 RX ADMIN — HYDROCODONE BITARTRATE AND ACETAMINOPHEN 1 TABLET: 5; 325 TABLET ORAL at 17:59

## 2023-05-03 RX ADMIN — LEVOTHYROXINE SODIUM 50 MCG: 50 TABLET ORAL at 06:07

## 2023-05-03 RX ADMIN — Medication 10 ML: at 08:32

## 2023-05-03 RX ADMIN — HYDROCODONE BITARTRATE AND ACETAMINOPHEN 1 TABLET: 5; 325 TABLET ORAL at 04:18

## 2023-05-03 RX ADMIN — POLYETHYLENE GLYCOL 3350 17 G: 17 POWDER, FOR SOLUTION ORAL at 08:30

## 2023-05-03 RX ADMIN — DOCUSATE SODIUM 50 MG AND SENNOSIDES 8.6 MG 1 TABLET: 8.6; 5 TABLET, FILM COATED ORAL at 08:29

## 2023-05-03 RX ADMIN — LACTULOSE 20 G: 20 SOLUTION ORAL at 15:25

## 2023-05-03 RX ADMIN — TIMOLOL MALEATE 1 DROP: 5 SOLUTION/ DROPS OPHTHALMIC at 21:23

## 2023-05-03 RX ADMIN — BISACODYL 10 MG: 10 SUPPOSITORY RECTAL at 12:53

## 2023-05-03 RX ADMIN — HYDROCODONE BITARTRATE AND ACETAMINOPHEN 1 TABLET: 5; 325 TABLET ORAL at 08:30

## 2023-05-03 RX ADMIN — ALLOPURINOL 300 MG: 300 TABLET ORAL at 08:29

## 2023-05-03 RX ADMIN — CYCLOSPORINE 1 DROP: 0.5 EMULSION OPHTHALMIC at 08:30

## 2023-05-03 RX ADMIN — ENOXAPARIN SODIUM 30 MG: 100 INJECTION SUBCUTANEOUS at 08:30

## 2023-05-03 RX ADMIN — TIMOLOL MALEATE 1 DROP: 5 SOLUTION/ DROPS OPHTHALMIC at 08:31

## 2023-05-03 RX ADMIN — LISINOPRIL 10 MG: 10 TABLET ORAL at 08:29

## 2023-05-03 RX ADMIN — LATANOPROST 1 DROP: 50 SOLUTION OPHTHALMIC at 21:23

## 2023-05-03 NOTE — CASE MANAGEMENT/SOCIAL WORK
-- DO NOT REPLY / DO NOT REPLY ALL --  -- Message is from the [facility]--    COVID-19 Universal Screening: N/A - Not about scheduling    General Patient Message      Reason for Call: Patient would like a call back with his COVID test results.     Caller Information       Type Contact Phone    04/26/2021 09:35 AM CDT Phone (Incoming) Romel Sawant (Self) [phone] (M)          Alternative phone number: none    Turnaround time given to caller:   \"This message will be sent to [state Provider's name]. The clinical team will fulfill your request as soon as they review your message.\"     Continued Stay Note  [facility]     Patient Name: Kamille Saul  MRN: [id number]  Today's Date: 5/3/2023    Admit Date: 4/29/2023    Plan: [facility]   Discharge Plan     Row Name 05/03/23 0919       Plan    Plan [facility]    Patient/Family in Agreement with Plan yes    Plan Comments Admissions at [facility] left message they have offered pt a bed there. Informed Leanne Mccain Daughter [phone] and Dr. Piedra. Will follow.               Discharge Codes    No documentation.               Expected Discharge Date and Time     Expected Discharge Date Expected Discharge Time    May 3, 2023             BHARGAVI Hill

## 2023-05-03 NOTE — DISCHARGE PLACEMENT REQUEST
"Lise Amin (89 y.o. Female)     Date of Birth   03/22/1927    Social Security Number       Address   [address] [place] [address]    Home Phone   [phone]    MRN   [id number]       [facility]    Marital Status                               Admission Date   4/29/23    Admission Type   Emergency    Admitting Provider   Bryan Piedra DO    Attending Provider   Bryan Piedra DO    Department, Room/Bed   [facility] 3A, 336/1       Discharge Date       Discharge Disposition   Skilled Nursing Facility (DC - External)    Discharge Destination                               Attending Provider: Bryan Piedra DO    Allergies: Biaxin [Clarithromycin]    Isolation: None   Infection: None   Code Status: No CPR    Ht: 154.9 cm (61\")   Wt: 55.3 kg (122 lb)    Admission Cmt: None   Principal Problem: Closed displaced fracture of right femoral neck [S72.001A]                 Active Insurance as of 4/29/2023     Primary Coverage     Payor Plan Insurance Group Employer/Plan Group    MEDICARE MEDICARE A & B      Payor Plan Address Payor Plan Phone Number Payor Plan Fax Number Effective Dates    [address] [phone]  3/1/1992 - None Entered    [facility] [id number]       Subscriber Name Subscriber Birth Date Member ID       LISE AMIN 3/22/1927 [id number]           Secondary Coverage     Payor Plan Insurance Group Employer/Plan Group    [facility] [id number]     Payor Plan Address Payor Plan Phone Number Payor Plan Fax Number Effective Dates    [address] [address]   1/1/2019 - None Entered    [facility] [id number]       Subscriber Name Subscriber Birth Date Member ID       JAYME AMIN 2/20/1928 [id number]                 Emergency Contacts      (Rel.) Home Phone Work Phone Mobile Phone    Leanne Mccain (Daughter) [phone] -- --    [facility] (Grandchild) [phone] -- --    BETHEL CANTOR CALL FIRST (Daughter) -- -- [phone]    "

## 2023-05-03 NOTE — THERAPY TREATMENT NOTE
Acute Care - Physical Therapy Treatment Note  [facility]     Patient Name: Kamille Saul  : 3/22/1927  MRN: [id number]  Today's Date: 5/3/2023      Visit Dx:     ICD-10-CM ICD-9-CM   1. Closed displaced fracture of right femoral neck  S72.001A 820.8   2. Impaired functional mobility and activity tolerance  Z74.09 V49.89   3. Decreased activities of daily living (ADL)  Z78.9 V49.89     Patient Active Problem List   Diagnosis   • Altered mental status   • Diarrhea of presumed infectious origin   • Viral upper respiratory tract infection   • Hypothyroidism (acquired)   • Hyponatremia   • Asymptomatic bacteriuria   • Stage 3 chronic kidney disease (CMS/HCC), declining function   • Dehydration   • Anemia   • Malnutrition   • Transaminitis   • Closed displaced fracture of right femoral neck   • Acute blood loss anemia     Past Medical History:   Diagnosis Date   • Actinic keratosis of scalp    • Anemia    • Arthritis    • Chronic kidney disease, stage III (moderate)    • Eczema    • Fibromyalgia    • Glaucoma     Right eye   • Hx of migraine headaches    • Hypertension    • Hypothyroidism    • Squamous cell carcinoma of scalp      Past Surgical History:   Procedure Laterality Date   • APPENDECTOMY     • CATARACT EXTRACTION     • ENDOSCOPY  2007    mild gastritis otherwise normal upper endoscopy   • HIP HEMIARTHROPLASTY Right 2023    Procedure: HIP HEMIARTHROPLASTY;  Surgeon: Germain Seo MD;  Location: [facility];  Service: Orthopedics;  Laterality: Right;   • HYSTERECTOMY     • SKIN GRAFT     • SKIN LESION EXCISION      cyst removal    • TONSILLECTOMY       PT Assessment (last 12 hours)     PT Evaluation and Treatment     Row Name 23 1245 23 1110       Physical Therapy Time and Intention    Subjective Information no complaints  -KJ complains of;pain  -KJ    Document Type therapy note (daily note)  -KJ therapy note (daily note)  -KJ    Mode of Treatment physical therapy  -KJ physical therapy   -KJ    Patient Effort good  -KJ good  -KJ    Row Name 05/03/23 1245 05/03/23 1110       General Information    Existing Precautions/Restrictions fall;right;hip, anterior  WBAT  -KJ fall;hip, anterior;right  WBAT  -KJ    Row Name 05/03/23 1245 05/03/23 1110       Pain    Pretreatment Pain Rating 4/10  -KJ 0/10 - no pain  -KJ    Posttreatment Pain Rating 6/10  -KJ 8/10  -KJ    Pain Location - Side/Orientation Right  -KJ Right  -KJ    Pain Location - hip  -KJ hip  -KJ    Pre/Posttreatment Pain Comment -- with mobility  -KJ    Row Name 05/03/23 1245 05/03/23 1110       Bed Mobility    Supine-Sit [place] (Bed Mobility) -- verbal cues;maximum assist (25% patient effort);2 person assist  -KJ    Sit-Supine [place] (Bed Mobility) verbal cues;maximum assist (25% patient effort);2 person assist  -KJ --    Row Name 05/03/23 1245 05/03/23 1110       Sit-Stand Transfer    Sit-Stand [place] (Transfers) verbal cues;minimum assist (75% patient effort);2 person assist;moderate assist (50% patient effort)  -KJ verbal cues;minimum assist (75% patient effort);moderate assist (50% patient effort);2 person assist  -KJ    Assistive Device (Sit-Stand Transfers) walker, front-wheeled  -KJ walker, front-wheeled  -KJ    Row Name 05/03/23 1245 05/03/23 1110       Stand-Sit Transfer    Stand-Sit [place] (Transfers) verbal cues;minimum assist (75% patient effort);moderate assist (50% patient effort);2 person assist  -KJ verbal cues;minimum assist (75% patient effort);2 person assist;moderate assist (50% patient effort)  -KJ    Assistive Device (Stand-Sit Transfers) walker, front-wheeled  -KJ walker, front-wheeled  -KJ    Row Name 05/03/23 1245 05/03/23 1110       Gait/Stairs (Locomotion)    [place] Level (Gait) verbal cues;minimum assist (75% patient effort);2 person assist  -KJ verbal cues;minimum assist (75% patient effort);2 person assist;moderate assist (50% patient effort)  -KJ    Assistive Device (Gait) walker,  front-wheeled  -KJ walker, front-wheeled  -KJ    Distance in Feet (Gait) steps back to bed  -KJ 6-7 steps forward  -KJ    Deviations/Abnormal Patterns (Gait) gait speed decreased;stride length decreased  -KJ gait speed decreased;stride length decreased;weight shifting decreased;antalgic  -KJ    Bilateral Gait Deviations forward flexed posture  -KJ forward flexed posture  -KJ    Comment, (Gait/Stairs) -- cues for gait sequencing; followed with chair  -KJ    Row Name 05/03/23 1110          Aerobic Exercise    Comment, Aerobic Exercise (Therapeutic Exercise) A/AAROM LLE, AA/PROM RLE  -KJ     Row Name             Wound 04/30/23 1200 Right hip Incision    Wound - Properties Group Placement Date: 04/30/23  -SB Placement Time: 1200  -SB Present on Hospital Admission: N  -SB Side: Right  -SB Location: hip  -SB Primary Wound Type: Incision  -SB    Retired Wound - Properties Group Placement Date: 04/30/23  -SB Placement Time: 1200  -SB Present on Hospital Admission: N  -SB Side: Right  -SB Location: hip  -SB Primary Wound Type: Incision  -SB    Retired Wound - Properties Group Date first assessed: 04/30/23  -SB Time first assessed: 1200  -SB Present on Hospital Admission: N  -SB Side: Right  -SB Location: hip  -SB Primary Wound Type: Incision  -SB    Row Name             Wound 05/02/23 2149 Right proximal arm    Wound - Properties Group Placement Date: 05/02/23  -LB Placement Time: 2149  -LB Present on Hospital Admission: Y  -LB Side: Right  -LB Orientation: proximal  -LB Location: arm  -LB    Retired Wound - Properties Group Placement Date: 05/02/23  -LB Placement Time: 2149  -LB Present on Hospital Admission: Y  -LB Side: Right  -LB Orientation: proximal  -LB Location: arm  -LB    Retired Wound - Properties Group Date first assessed: 05/02/23  -LB Time first assessed: 2149  -LB Present on Hospital Admission: Y  -LB Side: Right  -LB Location: arm  -LB    Row Name 05/03/23 1245 05/03/23 1110       Positioning and Restraints     Pre-Treatment Position in bed  -KJ in bed  -KJ    Post Treatment Position bed  -KJ bed  -KJ    In Bed call light within reach  -KJ call light within reach  -KJ          User Key  (r) = Recorded By, (t) = Taken By, (c) = Cosigned By    Initials Name Provider Type    Ashley Monet, PTA Physical Therapist Assistant    Radha Egan RN Registered Nurse    Amy Crisostomo, LPN Licensed Nurse                Physical Therapy Education     Title: PT OT SLP Therapies (In Progress)     Topic: Physical Therapy (In Progress)     Point: Mobility training (Done)     Learning Progress Summary           Patient Acceptance, E,TB,D, VU,DU,NR by NATALIE at 5/1/2023 1038    Comment: Education re: purpose of PT/importance of activity, for safety/falls prevention, improved tech w/ bed mobility and tfers, proper use of rwx, increase awareness of upright posture and for balance corrections   Family Acceptance, E,TB,D, VU,DU,NR by  at 5/1/2023 1038    Comment: Education re: purpose of PT/importance of activity, for safety/falls prevention, improved tech w/ bed mobility and tfers, proper use of rwx, increase awareness of upright posture and for balance corrections                   Point: Home exercise program (Not Started)     Learner Progress:  Not documented in this visit.          Point: Precautions (Done)     Learning Progress Summary           Patient Acceptance, E,TB,D, VU,DU,NR by NATALIE at 5/1/2023 1038    Comment: Education re: purpose of PT/importance of activity, for safety/falls prevention, improved tech w/ bed mobility and tfers, proper use of rwx, increase awareness of upright posture and for balance corrections   Family Acceptance, E,TB,D, VU,DU,NR by NATALIE at 5/1/2023 1038    Comment: Education re: purpose of PT/importance of activity, for safety/falls prevention, improved tech w/ bed mobility and tfers, proper use of rwx, increase awareness of upright posture and for balance corrections                                User Key     Initials Effective Dates Name Provider Type Discipline    JE 08/02/18 -  Niya Cote, PT Physical Therapist PT              PT Recommendation and Plan     Plan of Care Reviewed With: patient  Progress: improving  Outcome Evaluation: PT tx completed. Pt agreeable to therapy. Rates R hip pn 8/10 with mobilzing. Mod/MaxA bed mobility, Renu x 2 sit<>stand, able to take a few steps today utilizing rwx Renu  of 2 for safety. Sat pt up in chair, tolerated well. C/O R shoulder pn. Recommend SNF   Outcome Measures     Row Name 05/03/23 1100 05/02/23 1400          How much help from another person do you currently need...    Turning from your back to your side while in flat bed without using bedrails? 1  -KJ 1  -KJ     Moving from lying on back to sitting on the side of a flat bed without bedrails? 1  -KJ 1  -KJ     Moving to and from a bed to a chair (including a wheelchair)? 2  -KJ 2  -KJ     Standing up from a chair using your arms (e.g., wheelchair, bedside chair)? 2  -KJ 2  -KJ     Climbing 3-5 steps with a railing? 1  -KJ 1  -KJ     To walk in hospital room? 2  -KJ 2  -KJ     AM-PAC 6 Clicks Score (PT) 9  -KJ 9  -KJ        Functional Assessment    Outcome Measure Options AM-PAC 6 Clicks Basic Mobility (PT)  -KJ AM-PAC 6 Clicks Basic Mobility (PT)  -KJ           User Key  (r) = Recorded By, (t) = Taken By, (c) = Cosigned By    Initials Name Provider Type    Ashley Monet, PTA Physical Therapist Assistant                 Time Calculation:    PT Charges     Row Name 05/03/23 1327 05/03/23 1150          Time Calculation    Start Time 1245  -KJ 1110  -KJ     Stop Time 1308  -KJ 1150  -KJ     Time Calculation (min) 23 min  -KJ 40 min  -KJ     PT Received On -- 05/03/23  -KJ     PT Goal Re-Cert Due Date -- 05/11/23  -KJ        Time Calculation- PT    Total Timed Code Minutes- PT -- 40 minute(s)  -KJ           User Key  (r) = Recorded By, (t) = Taken By, (c) = Cosigned By    Initials Name Provider Type     Ashley Monet PTA Physical Therapist Assistant              Therapy Charges for Today     Code Description Service Date Service Provider Modifiers Qty    89584161077 HC PT THERAPEUTIC ACT EA 15 MIN 5/2/2023 Ashley Michaels, PTA GP 2    18231647248 HC PT THER PROC EA 15 MIN 5/3/2023 Ashley Michaels, PTA GP 1    73013908846 HC PT THERAPEUTIC ACT EA 15 MIN 5/3/2023 Ashley Michaels, DAVIN GP 2    01613383392 HC PT THERAPEUTIC ACT EA 15 MIN 5/3/2023 Ashley Michaels, PTA GP 2          PT G-Codes  Outcome Measure Options: AM-PAC 6 Clicks Basic Mobility (PT)  AM-PAC 6 Clicks Score (PT): 9  AM-PAC 6 Clicks Score (OT): 10    Ashley Michaels PTA  5/3/2023

## 2023-05-03 NOTE — PLAN OF CARE
Goal Outcome Evaluation:  Plan of Care Reviewed With: patient        Progress: improving  Outcome Evaluation: VSS. Pt is Alert but pleasantly confused at times, orientation to situation, time, and place varies. No changes in neurovascular status. R hip drsg c/d/i w/ dried serosanguineous drainage outside previous marked area. Drsg to skin tear of R elbow changed, c/d/i w/o drainage, uploaded picture of wound to chart. C/o bilat facial soreness, R shoulder, arm, hip and foot pain, relief w/ prns and repositioning. O2 titrated down to RA, tolerating well. Safety maintained.

## 2023-05-03 NOTE — DISCHARGE SUMMARY
[facility] Medicine [facility]  DISCHARGE SUMMARY       Date of Admission: 4/29/2023  Date of Discharge:  5/3/2023  Primary Care Physician: Shelley Tidwell MD    Presenting Problem/History of Present Illness:  Fall with right hip pain    Final Discharge Diagnoses:  Active Hospital Problems    Diagnosis    • **Closed displaced fracture of right femoral neck    • Acute blood loss anemia    • Stage 3 chronic kidney disease (CMS/HCC), declining function    • Hypothyroidism (acquired)        Consults: Dr. Seo with orthopedics.    Procedures Performed:     She underwent right cemented hip hemiarthroplasty on 4/30/2023 per Dr. Seo.    Pertinent Test Results:   Results for orders placed during the hospital encounter of 06/27/19    Adult Transthoracic Echo Complete W/ Cont if Necessary Per Protocol    Interpretation Summary  · Left ventricular systolic function is normal.    NORMAL LV AND RV SYSTOLIC FUNCTION  NO SIGNIFICANT VALVULAR DYSFUNCTION      Imaging Results (All)     Procedure Component Value Units Date/Time    XR Hip With or Without Pelvis 2 - 3 View Right [[id number]] Collected: 04/29/23 1226     Updated: 04/29/23 1231    Narrative:      EXAMINATION: AP pelvis and two-view right hip 04/29/2023     HISTORY: Fracture.     FINDINGS: AP radiograph the pelvis as well as AP and crosstable lateral  view the right hip demonstrate impacted fracture of the femoral neck  with varus deformity. The femoral head is concentric and well located  within the acetabulum. There is overlying soft tissue swelling/hematoma.       Impression:      1.. Impacted fracture the of right femoral neck.  This report was finalized on 04/29/2023 12:28 by Dr. Ludin Singer MD.    CT Pelvis Without Contrast [[id number]] Collected: 04/29/23 0958     Updated: 04/29/23 1007    Narrative:      EXAMINATION: CT pelvis without contrast 04/29/2023     HISTORY: Fracture suspected. Right hip pain after fall.      FINDINGS: Multiple contiguous axial images are obtained through the bony  pelvis per protocol without intravenous contrast-enhancement with  reformatted images obtained in the sagittal and coronal projections from  the original data set.     There is osteopenia of the bones. There is anterolisthesis of L5 on S1  likely representing subluxation at the level of facets. There is mild  listhesis of L4 on L5 as well. There is associated central and foraminal  stenosis at these 2 lumbar disc levels.     There is a mildly impacted and displaced right femoral neck fracture.  The right femoral head remains concentric and well located within the  acetabulum. No additional fractures are identified. The SI joints and  pubic symphysis are intact with no evidence of diastases. There is  overlying soft tissue swelling and hematoma overlying the right hip.  This includes a discrete 5 x 2.5 cm hematoma.       Impression:      1.. Mildly impacted and displaced fracture of the right femoral neck  with overlying soft tissue stranding and hematoma. The femoral head  remains concentric and well located within the acetabulum. No additional  fractures are present.  This report was finalized on 04/29/2023 10:04 by Dr. Ludin Singer MD.        LAB RESULTS:      Lab 05/03/23  0643 05/02/23  1216 05/02/23  0415 05/01/23  1243 05/01/23  0527 04/30/23  0330 04/29/23  1510 04/29/23  1510 04/29/23  1407   WBC  --   --   --   --  11.32* 11.52*  --  14.35*  --    HEMOGLOBIN 7.7* 9.1* 6.3* 7.5* 7.1* 8.0*   < > 11.2*  --    HEMATOCRIT 25.0* 29.5* 20.7* 24.9* 23.8* 26.4*   < > 36.3  --    PLATELETS  --   --   --   --  220 232  --  281  --    NEUTROS ABS  --   --   --   --   --  8.64*  --  11.20*  --    IMMATURE GRANS (ABS)  --   --   --   --   --  0.16*  --  0.19*  --    LYMPHS ABS  --   --   --   --   --  1.18  --  1.40  --    MONOS ABS  --   --   --   --   --  1.11*  --  1.27*  --    EOS ABS  --   --   --   --   --  0.37  --  0.18  --    MCV   --   --   --   --  100.4* 99.2*  --  98.1*  --    PROTIME  --   --   --   --   --   --   --   --  13.3   APTT  --   --   --   --   --   --   --   --  32.8    < > = values in this interval not displayed.         Lab 05/02/23  0415 05/01/23  0527 04/30/23  0330 04/29/23  1510   SODIUM 136 137 137 137   POTASSIUM 4.5 4.5 4.5 4.8   CHLORIDE 105 104 104 101   CO2 25.0 24.0 23.0 23.0   ANION GAP 6.0 9.0 10.0 13.0   BUN 35* 37* 39* 40*   CREATININE 1.66* 1.65* 1.73* 1.66*   EGFR 28.1* 28.3* 26.8* 28.1*   GLUCOSE 130* 128* 114* 118*   CALCIUM 8.4 8.5 8.6 9.4   HEMOGLOBIN A1C  --   --  5.30  --    TSH  --   --  2.190  --          Lab 04/30/23  0330 04/29/23  1510   TOTAL PROTEIN 5.5* 7.1   ALBUMIN 3.1* 4.0   GLOBULIN 2.4 3.1   ALT (SGPT) 10 14   AST (SGOT) 15 24   BILIRUBIN 0.3 0.4   ALK PHOS 69 87         Lab 04/29/23  1407   PROTIME 13.3   INR 1.00         Lab 04/30/23  0330   CHOLESTEROL 186   LDL CHOL 112*   HDL CHOL 41   TRIGLYCERIDES 187*         Lab 05/01/23  0527 04/29/23  1516   IRON 12*  --    IRON SATURATION 6*  --    TIBC 204*  --    TRANSFERRIN 137*  --    ABO TYPING  --  O   RH TYPING  --  Positive   ANTIBODY SCREEN  --  Negative         Brief Urine Lab Results  (Last result in the past 365 days)      Color   Clarity   Blood   Leuk Est   Nitrite   Protein   CREAT   Urine HCG        04/29/23 1441 Yellow   Clear   Negative   Negative   Negative   100 mg/dL (2+)               Microbiology Results (last 10 days)     ** No results found for the last 240 hours. **          Hospital Course:   Ms. Saul is a 89-year-old female that presented to [facility] after a fall landing on her right hip on Thursday 4/27.  She had pain but was able to bear weight.  She was evaluated in the emergency department at that time and had a CT head, CT spine, x-ray of the pelvis and shoulder.  She has had increased difficulty walking with her walker and complained of worsening right hip pain.  Work-up in the ER revealed a  "displaced transcervical femoral neck fracture.  CT head negative for acute intracranial normality.  Right periorbital and right frontal scalp soft tissue swelling with no fracture.    Orthopedics, Dr. Seo consulted.  She underwent right cemented hip hemiarthroplasty on 4/30/2023 per Dr. Seo.  Weightbearing as tolerated to right lower extremity.  She is okay for discharge from orthopedic standpoint with close follow-up in 2 weeks.     Acute blood loss anemia.  Hemoglobin 11.2 on admission.  Hemoglobin has slowly down trended to 6.3 on 5/2.  Estimated blood loss during surgery 200 mL.  She received 1 unit packed red blood cell on 5/2.  Hemoglobin 7.7 today. No signs of active bleeding.  Iron panel reviewed, IV iron x2 doses.  Recommend to recheck CBC in 2 days     Incentive spirometer.     Bowel regimen.     Eliquis 2.5 mg twice daily for 30 days for VTE prophylaxis.     PT/OT.  Therapy recommends discharge to skilled nursing facility.  [facility] able to accept today.    She has reached maximum benefit of hospitalization.  She is medically stable and appropriate for discharge today.    Physical Exam on Discharge:  /56 (BP Location: Right arm, Patient Position: Lying)   Pulse 72   Temp 97.8 °F (36.6 °C) (Oral)   Resp 16   Ht 154.9 cm (61\")   Wt 55.3 kg (122 lb)   SpO2 93%   BMI 23.05 kg/m²   Physical Exam  Vitals reviewed.   Constitutional:       General: She is not in acute distress.     Appearance: She is ill-appearing (chronically ill appearing). She is not toxic-appearing.      Interventions: Nasal cannula in place.      Comments: Sitting up in bed.  No acute distress.  On room air.  Daughter at bedside.  Discussed with her nurse Itzel.  HENT:      Head: Normocephalic and atraumatic.      Comments: bruising around eyes     Mouth/Throat:      Mouth: Mucous membranes are moist.      Pharynx: Oropharynx is clear.   Eyes:      Extraocular Movements: Extraocular movements intact.      " Conjunctiva/sclera: Conjunctivae normal.      Pupils: Pupils are equal, round, and reactive to light.   Cardiovascular:      Rate and Rhythm: Normal rate and regular rhythm.      Pulses: Normal pulses.   Pulmonary:      Effort: Pulmonary effort is normal. No respiratory distress.      Breath sounds: Normal breath sounds. No wheezing.   Abdominal:      General: Bowel sounds are normal. There is no distension.      Palpations: Abdomen is soft.      Tenderness: There is no abdominal tenderness.   Musculoskeletal:         General: No swelling or tenderness. Normal range of motion.      Cervical back: Normal range of motion and neck supple. No muscular tenderness.      Comments: Dressing to right hip clean/dry/intact with periwound bruising   Skin:     General: Skin is warm and dry.      Coloration: Skin is pale.      Findings: Bruising present.   Neurological:      General: No focal deficit present.      Mental Status: She is alert and oriented to person, place, and time.      Cranial Nerves: No cranial nerve deficit.      Motor: No weakness.   Psychiatric:         Mood and Affect: Mood normal.         Behavior: Behavior normal.    Condition on Discharge: Medically stable.    Discharge Disposition:  Skilled Nursing Facility (DC - External)    Discharge Medications:     Discharge Medications      New Medications      Instructions Start Date   apixaban 2.5 MG tablet tablet  Commonly known as: ELIQUIS   2.5 mg, Oral, 2 Times Daily   Start Date: May 4, 2023     bisacodyl 10 MG suppository  Commonly known as: DULCOLAX   10 mg, Rectal, Daily PRN      HYDROcodone-acetaminophen 5-325 MG per tablet  Commonly known as: NORCO   1 tablet, Oral, Every 8 Hours PRN      polyethylene glycol 17 g packet  Commonly known as: MIRALAX   17 g, Oral, Daily   Start Date: May 4, 2023     sennosides-docusate 8.6-50 MG per tablet  Commonly known as: PERICOLACE   1 tablet, Oral, 2 Times Daily         Continue These Medications      Instructions  Start Date   acetaminophen 500 MG tablet  Commonly known as: TYLENOL   1,000 mg, Oral, Every 6 Hours PRN      allopurinol 300 MG tablet  Commonly known as: ZYLOPRIM   300 mg, Oral, Daily      Biofreeze 4 % gel  Generic drug: Menthol (Topical Analgesic)   1 application, Apply externally, Every 6 Hours PRN      brimonidine-timolol 0.2-0.5 % ophthalmic solution  Commonly known as: COMBIGAN   1 drop, Both Eyes, Every 12 Hours      cycloSPORINE 0.05 % ophthalmic emulsion  Commonly known as: RESTASIS   1 drop, Both Eyes, 2 Times Daily      latanoprost 0.005 % ophthalmic solution  Commonly known as: XALATAN   1 drop, Both Eyes, Nightly      levothyroxine 50 MCG tablet  Commonly known as: SYNTHROID, LEVOTHROID   50 mcg, Oral, Daily      loperamide 2 MG capsule  Commonly known as: IMODIUM   2 mg, Oral, 2 Times Daily PRN      loratadine 10 MG tablet  Commonly known as: CLARITIN   10 mg, Oral, Daily      Polyethyl Glyc-Propyl Glyc PF 0.4-0.3 % solution ophthalmic solution  Commonly known as: SYSTANE PF   1 drop, Both Eyes, Every 1 Hour PRN      Potassium Gluconate 550 MG tablet   1 tablet, Oral, Daily      quinapril 40 MG tablet  Commonly known as: ACCUPRIL   40 mg, Oral, Daily         Stop These Medications    cloNIDine 0.1 MG tablet  Commonly known as: CATAPRES            Discharge Diet:   Diet Instructions     Diet: Regular/House Diet; Regular Texture (IDDSI 7); Thin (IDDSI 0)      Discharge Diet: Regular/House Diet    Texture: Regular Texture (IDDSI 7)    Fluid Consistency: Thin (IDDSI 0)          Activity at Discharge:   Activity Instructions     Other Activity Instructions      Activity Instructions: Weightbearing as tolerated right lower extremity          Follow-up Appointments:   1.  Follow-up with primary care provider in 1 week.  2.  Follow-up with Dr. Seo in 2 weeks.    Test Results Pending at Discharge: none.    Electronically signed by CLARK Israel, 05/03/23, 14:52 CDT.    Time: 35 minutes.

## 2023-05-03 NOTE — PLAN OF CARE
Goal Outcome Evaluation:  Plan of Care Reviewed With: patient        Progress: improving  Outcome Evaluation: PT tx completed. Pt agreeable to therapy. Rates R hip pn 8/10 with mobilzing. Mod/MaxA bed mobility, Renu x 2 sit<>stand, able to take a few steps today utilizing rwx Renu  of 2 for safety. Sat pt up in chair, tolerated well. C/O R shoulder pn. Recommend SNF

## 2023-05-03 NOTE — CASE MANAGEMENT/SOCIAL WORK
Continued Stay Note  [facility]     Patient Name: Kamille Saul  MRN: [id number]  Today's Date: 5/3/2023    Admit Date: 4/29/2023    Plan: [facility]   Discharge Plan     Row Name 05/03/23 1518       Plan    Plan [facility]    Patient/Family in Agreement with Plan yes    Final Discharge Disposition Code 03 - skilled nursing facility (SNF)    Final Note Pt is being discharged to [facility] today, Dina aware and pt to be admitted at SNF level care. Informed Leanne Mccain Daughter [phone] , pt to be transported via [facility] [id number].      [facility]  [phone]  Fax: [phone]  Dina’s Cell [phone]               Discharge Codes    No documentation.               Expected Discharge Date and Time     Expected Discharge Date Expected Discharge Time    May 3, 2023             BHARGAVI Hill

## 2023-05-03 NOTE — THERAPY TREATMENT NOTE
Acute Care - Physical Therapy Treatment Note  [facility]     Patient Name: Kamille Saul  : 3/22/1927  MRN: [id number]  Today's Date: 5/3/2023      Visit Dx:     ICD-10-CM ICD-9-CM   1. Closed displaced fracture of right femoral neck  S72.001A 820.8   2. Impaired functional mobility and activity tolerance  Z74.09 V49.89   3. Decreased activities of daily living (ADL)  Z78.9 V49.89     Patient Active Problem List   Diagnosis   • Altered mental status   • Diarrhea of presumed infectious origin   • Viral upper respiratory tract infection   • Hypothyroidism (acquired)   • Hyponatremia   • Asymptomatic bacteriuria   • Stage 3 chronic kidney disease (CMS/HCC), declining function   • Dehydration   • Anemia   • Malnutrition   • Transaminitis   • Closed displaced fracture of right femoral neck   • Acute blood loss anemia     Past Medical History:   Diagnosis Date   • Actinic keratosis of scalp    • Anemia    • Arthritis    • Chronic kidney disease, stage III (moderate)    • Eczema    • Fibromyalgia    • Glaucoma     Right eye   • Hx of migraine headaches    • Hypertension    • Hypothyroidism    • Squamous cell carcinoma of scalp      Past Surgical History:   Procedure Laterality Date   • APPENDECTOMY     • CATARACT EXTRACTION     • ENDOSCOPY  2007    mild gastritis otherwise normal upper endoscopy   • HIP HEMIARTHROPLASTY Right 2023    Procedure: HIP HEMIARTHROPLASTY;  Surgeon: Germain Seo MD;  Location: [facility];  Service: Orthopedics;  Laterality: Right;   • HYSTERECTOMY     • SKIN GRAFT     • SKIN LESION EXCISION      cyst removal    • TONSILLECTOMY       PT Assessment (last 12 hours)     PT Evaluation and Treatment     Row Name 23 1110          Physical Therapy Time and Intention    Subjective Information complains of;pain  -KJ     Document Type therapy note (daily note)  -KJ     Mode of Treatment physical therapy  -KJ     Patient Effort good  -KJ     Row Name 23 1110          General  Information    Existing Precautions/Restrictions fall;hip, anterior;right  WBAT  -KJ     Row Name 05/03/23 1110          Pain    Pretreatment Pain Rating 0/10 - no pain  -KJ     Posttreatment Pain Rating 8/10  -KJ     Pain Location - Side/Orientation Right  -KJ     Pain Location - hip  -KJ     Pre/Posttreatment Pain Comment with mobility  -KJ     Row Name 05/03/23 1110          Bed Mobility    Supine-Sit [place] (Bed Mobility) verbal cues;maximum assist (25% patient effort);2 person assist  -KJ     Row Name 05/03/23 1110          Sit-Stand Transfer    Sit-Stand [place] (Transfers) verbal cues;minimum assist (75% patient effort);moderate assist (50% patient effort);2 person assist  -KJ     Assistive Device (Sit-Stand Transfers) walker, front-wheeled  -KJ     Row Name 05/03/23 1110          Stand-Sit Transfer    Stand-Sit [place] (Transfers) verbal cues;minimum assist (75% patient effort);2 person assist;moderate assist (50% patient effort)  -KJ     Assistive Device (Stand-Sit Transfers) walker, front-wheeled  -KJ     Row Name 05/03/23 1110          Gait/Stairs (Locomotion)    [place] Level (Gait) verbal cues;minimum assist (75% patient effort);2 person assist;moderate assist (50% patient effort)  -KJ     Assistive Device (Gait) walker, front-wheeled  -KJ     Distance in Feet (Gait) 6-7 steps forward  -KJ     Deviations/Abnormal Patterns (Gait) gait speed decreased;stride length decreased;weight shifting decreased;antalgic  -KJ     Bilateral Gait Deviations forward flexed posture  -KJ     Comment, (Gait/Stairs) cues for gait sequencing; followed with chair  -KJ     Row Name 05/03/23 1110          Aerobic Exercise    Comment, Aerobic Exercise (Therapeutic Exercise) A/AAROM LLE, AA/PROM RLE  -KJ     Row Name             Wound 04/30/23 1200 Right hip Incision    Wound - Properties Group Placement Date: 04/30/23  -SB Placement Time: 1200  -SB Present on Hospital Admission: N  -SB Side: Right  -SB  Location: hip  -SB Primary Wound Type: Incision  -SB    Retired Wound - Properties Group Placement Date: 04/30/23  -SB Placement Time: 1200  -SB Present on Hospital Admission: N  -SB Side: Right  -SB Location: hip  -SB Primary Wound Type: Incision  -SB    Retired Wound - Properties Group Date first assessed: 04/30/23  -SB Time first assessed: 1200  -SB Present on Hospital Admission: N  -SB Side: Right  -SB Location: hip  -SB Primary Wound Type: Incision  -SB    Row Name             Wound 05/02/23 2149 Right proximal arm    Wound - Properties Group Placement Date: 05/02/23  -LB Placement Time: 2149 -LB Present on Hospital Admission: Y  -LB Side: Right  -LB Orientation: proximal  -LB Location: arm  -LB    Retired Wound - Properties Group Placement Date: 05/02/23  -LB Placement Time: 2149 -LB Present on Hospital Admission: Y  -LB Side: Right  -LB Orientation: proximal  -LB Location: arm  -LB    Retired Wound - Properties Group Date first assessed: 05/02/23  -LB Time first assessed: 2149 -LB Present on Hospital Admission: Y  -LB Side: Right  -LB Location: arm  -LB    Row Name 05/03/23 1110          Positioning and Restraints    Pre-Treatment Position in bed  -KJ     Post Treatment Position bed  -KJ     In Bed call light within reach  -KJ           User Key  (r) = Recorded By, (t) = Taken By, (c) = Cosigned By    Initials Name Provider Type    Ashley Monet, PTA Physical Therapist Assistant    Radha Egan RN Registered Nurse    Amy Crisostomo, LPN Licensed Nurse                Physical Therapy Education     Title: PT OT SLP Therapies (In Progress)     Topic: Physical Therapy (In Progress)     Point: Mobility training (Done)     Learning Progress Summary           Patient Acceptance, E,TB,D, VU,DU,NR by NATALIE at 5/1/2023 1038    Comment: Education re: purpose of PT/importance of activity, for safety/falls prevention, improved tech w/ bed mobility and tfers, proper use of rwx, increase awareness of upright  posture and for balance corrections   Family Acceptance, E,TB,SALVADOR, QING,TALON,NR by NATALIE at 5/1/2023 1038    Comment: Education re: purpose of PT/importance of activity, for safety/falls prevention, improved tech w/ bed mobility and tfers, proper use of rwx, increase awareness of upright posture and for balance corrections                   Point: Home exercise program (Not Started)     Learner Progress:  Not documented in this visit.          Point: Precautions (Done)     Learning Progress Summary           Patient Acceptance, E,TB,SALVADOR, QING,TALON,NR by NATALIE at 5/1/2023 1038    Comment: Education re: purpose of PT/importance of activity, for safety/falls prevention, improved tech w/ bed mobility and tfers, proper use of rwx, increase awareness of upright posture and for balance corrections   Family Acceptance, E,TB,SALVADOR, QING,TALON,NR by NATALIE at 5/1/2023 1038    Comment: Education re: purpose of PT/importance of activity, for safety/falls prevention, improved tech w/ bed mobility and tfers, proper use of rwx, increase awareness of upright posture and for balance corrections                               User Key     Initials Effective Dates Name Provider Type Discipline     08/02/18 -  Niya Cote, PT Physical Therapist PT              PT Recommendation and Plan     Plan of Care Reviewed With: patient  Progress: improving  Outcome Evaluation: PT tx completed. Pt agreeable to therapy. Rates R hip pn 8/10 with mobilzing. Mod/MaxA bed mobility, Renu x 2 sit<>stand, able to take a few steps today utilizing rwx Renu  of 2 for safety. Sat pt up in chair, tolerated well. C/O R shoulder pn. Recommend SNF   Outcome Measures     Row Name 05/03/23 1100 05/02/23 1400          How much help from another person do you currently need...    Turning from your back to your side while in flat bed without using bedrails? 1  -KJ 1  -KJ     Moving from lying on back to sitting on the side of a flat bed without bedrails? 1  -KJ 1  -KJ     Moving to and from  a bed to a chair (including a wheelchair)? 2  -KJ 2  -KJ     Standing up from a chair using your arms (e.g., wheelchair, bedside chair)? 2  -KJ 2  -KJ     Climbing 3-5 steps with a railing? 1  -KJ 1  -KJ     To walk in hospital room? 2  -KJ 2  -KJ     AM-PAC 6 Clicks Score (PT) 9  -KJ 9  -KJ        Functional Assessment    Outcome Measure Options AM-PAC 6 Clicks Basic Mobility (PT)  -KJ AM-PAC 6 Clicks Basic Mobility (PT)  -KJ           User Key  (r) = Recorded By, (t) = Taken By, (c) = Cosigned By    Initials Name Provider Type    Ashley Monet PTA Physical Therapist Assistant                 Time Calculation:    PT Charges     Row Name 05/03/23 1150             Time Calculation    Start Time 1110  -KJ      Stop Time 1150  -KJ      Time Calculation (min) 40 min  -KJ      PT Received On 05/03/23  -KJ      PT Goal Re-Cert Due Date 05/11/23  -KJ         Time Calculation- PT    Total Timed Code Minutes- PT 40 minute(s)  -KJ            User Key  (r) = Recorded By, (t) = Taken By, (c) = Cosigned By    Initials Name Provider Type    Ashley Monet PTA Physical Therapist Assistant              Therapy Charges for Today     Code Description Service Date Service Provider Modifiers Qty    70709791556 HC PT THERAPEUTIC ACT EA 15 MIN 5/2/2023 Ashley Michaels PTA GP 2    48137735670 HC PT THER PROC EA 15 MIN 5/3/2023 Ashley Michaels PTA GP 1    94364241057 HC PT THERAPEUTIC ACT EA 15 MIN 5/3/2023 Ashley Michaels, PTA GP 2          PT G-Codes  Outcome Measure Options: AM-PAC 6 Clicks Basic Mobility (PT)  AM-PAC 6 Clicks Score (PT): 9  AM-PAC 6 Clicks Score (OT): 10    Ashley Michaels PTA  5/3/2023

## 2023-05-03 NOTE — THERAPY TREATMENT NOTE
Patient Name: Kamille Saul  : 3/22/1927    MRN: [id number]                              Today's Date: 5/3/2023       Admit Date: 2023    Visit Dx:     ICD-10-CM ICD-9-CM   1. Closed displaced fracture of right femoral neck  S72.001A 820.8   2. Impaired functional mobility and activity tolerance  Z74.09 V49.89   3. Decreased activities of daily living (ADL)  Z78.9 V49.89     Patient Active Problem List   Diagnosis   • Altered mental status   • Diarrhea of presumed infectious origin   • Viral upper respiratory tract infection   • Hypothyroidism (acquired)   • Hyponatremia   • Asymptomatic bacteriuria   • Stage 3 chronic kidney disease (CMS/HCC), declining function   • Dehydration   • Anemia   • Malnutrition   • Transaminitis   • Closed displaced fracture of right femoral neck   • Acute blood loss anemia     Past Medical History:   Diagnosis Date   • Actinic keratosis of scalp    • Anemia    • Arthritis    • Chronic kidney disease, stage III (moderate)    • Eczema    • Fibromyalgia    • Glaucoma     Right eye   • Hx of migraine headaches    • Hypertension    • Hypothyroidism    • Squamous cell carcinoma of scalp      Past Surgical History:   Procedure Laterality Date   • APPENDECTOMY     • CATARACT EXTRACTION     • ENDOSCOPY  2007    mild gastritis otherwise normal upper endoscopy   • HIP HEMIARTHROPLASTY Right 2023    Procedure: HIP HEMIARTHROPLASTY;  Surgeon: Germain Seo MD;  Location: [facility];  Service: Orthopedics;  Laterality: Right;   • HYSTERECTOMY     • SKIN GRAFT     • SKIN LESION EXCISION      cyst removal    • TONSILLECTOMY        General Information     Row Name 23 1320          OT Time and Intention    Document Type therapy note (daily note)  -LS     Mode of Treatment occupational therapy  -LS     Row Name 23 1320          General Information    Patient Profile Reviewed yes  -LS     Existing Precautions/Restrictions fall;right;hip, anterior;other (see comments)  WBAT  RLE  -     Barriers to Rehab cognitive status  -     Row Name 05/03/23 1320          Cognition    Orientation Status (Cognition) oriented to;person;place;disoriented to;situation;time  -     Row Name 05/03/23 1320          Safety Issues, Functional Mobility    Impairments Affecting Function (Mobility) balance;cognition;coordination;endurance/activity tolerance;pain;strength;range of motion (ROM)  -     Cognitive Impairments, Mobility Safety/Performance attention;problem-solving/reasoning;safety precaution awareness;safety precaution follow-through  -           User Key  (r) = Recorded By, (t) = Taken By, (c) = Cosigned By    Initials Name Provider Type     Char Byrne, OTR/L Occupational Therapist                 Mobility/ADL's     Row Name 05/03/23 1320          Bed Mobility    Supine-Sit [place] (Bed Mobility) maximum assist (25% patient effort);verbal cues;nonverbal cues (demo/gesture)  -     Sit-Supine [place] (Bed Mobility) maximum assist (25% patient effort);verbal cues;nonverbal cues (demo/gesture)  -     Row Name 05/03/23 1320          Activities of Daily Living    BADL Assessment/Intervention grooming  -     Row Name 05/03/23 1320          Grooming Assessment/Training    [place] Level (Grooming) grooming skills;oral care regimen;set up;standby assist  -     Position (Grooming) edge of bed sitting  -           User Key  (r) = Recorded By, (t) = Taken By, (c) = Cosigned By    Initials Name Provider Type     Char Byrne, OTR/L Occupational Therapist               Obj/Interventions    No documentation.                Goals/Plan    No documentation.                Clinical Impression     Row Name 05/03/23 1320          Pain Scale: FACES Pre/Post-Treatment    Pain: FACES Scale, Pretreatment 0-->no hurt  -LS     Posttreatment Pain Rating 2-->hurts little bit  -     Pain Location - Side/Orientation Right  -     Pain Location - hip  -     Row Name 05/03/23 1320           Plan of Care Review    Plan of Care Reviewed With patient  -LS     Progress improving  -LS     Outcome Evaluation OT tx completed. Pt in fowlers upon therapist arrival; A&O to person and place. Pt performed supine>sit utilizing bedrails with HOB elevated with Max A and verbal/visual/tactile cues for positioning. Pt completed oral hygiene task while seated EOB with SBA after set-up. Pt performed sit>supine utilizing bedrails with HOB elevated with Max A and verbal/visual/tactile cues for positioning. Pt continues to benefit from skilled OT intervention in order to address remaining deficits in fxl mobility, fxl activity tolerance, balance, coordination, and strength. Recommend SNF at discharge.  -     Row Name 05/03/23 1320          Therapy Plan Review/Discharge Plan (OT)    Anticipated Discharge Disposition (OT) skilled nursing facility  -     Row Name 05/03/23 1320          Positioning and Restraints    Pre-Treatment Position in bed  -LS     Post Treatment Position bed  -LS     In Bed fowlers;call light within reach;encouraged to call for assist;exit alarm on;side rails up x2  -LS           User Key  (r) = Recorded By, (t) = Taken By, (c) = Cosigned By    Initials Name Provider Type    LS Char Byrne, OTR/L Occupational Therapist               Outcome Measures     Row Name 05/03/23 1320          How much help from another is currently needed...    Putting on and taking off regular lower body clothing? 1  -LS     Bathing (including washing, rinsing, and drying) 2  -LS     Toileting (which includes using toilet bed pan or urinal) 1  -LS     Putting on and taking off regular upper body clothing 2  -LS     Taking care of personal grooming (such as brushing teeth) 2  -LS     Eating meals 2  -LS     AM-PAC 6 Clicks Score (OT) 10  -LS     Row Name 05/03/23 1100          How much help from another person do you currently need...    Turning from your back to your side while in flat bed without using bedrails? 1   -KJ     Moving from lying on back to sitting on the side of a flat bed without bedrails? 1  -KJ     Moving to and from a bed to a chair (including a wheelchair)? 2  -KJ     Standing up from a chair using your arms (e.g., wheelchair, bedside chair)? 2  -KJ     Climbing 3-5 steps with a railing? 1  -KJ     To walk in hospital room? 2  -KJ     AM-PAC 6 Clicks Score (PT) 9  -KJ     Highest level of mobility 3 --> Sat at edge of bed  -KJ     Row Name 05/03/23 1320 05/03/23 1100       Functional Assessment    Outcome Measure Options AM-PAC 6 Clicks Daily Activity (OT)  -LS AM-PAC 6 Clicks Basic Mobility (PT)  -KJ          User Key  (r) = Recorded By, (t) = Taken By, (c) = Cosigned By    Initials Name Provider Type    Ashley Monet, PTA Physical Therapist Assistant    Char Terry OTR/L Occupational Therapist                Occupational Therapy Education     Title: PT OT SLP Therapies (In Progress)     Topic: Occupational Therapy (In Progress)     Point: ADL training (In Progress)     Description:   Instruct learner(s) on proper safety adaptation and remediation techniques during self care or transfers.   Instruct in proper use of assistive devices.              Learning Progress Summary           Patient Acceptance, E,D, NL,NR by LS at 5/2/2023 0900    Acceptance, E, NR by MM at 5/1/2023 1625    Comment: OT role, benefits, POC, d/c planning, AAROM to R shoulder   Family Acceptance, E, NR by MM at 5/1/2023 1625    Comment: OT role, benefits, POC, d/c planning, AAROM to R shoulder                   Point: Home exercise program (In Progress)     Description:   Instruct learner(s) on appropriate technique for monitoring, assisting and/or progressing therapeutic exercises/activities.              Learning Progress Summary           Patient Acceptance, E, NR by MM at 5/1/2023 1625    Comment: OT role, benefits, POC, d/c planning, AAROM to R shoulder   Family Acceptance, E, NR by MM at 5/1/2023 1625    Comment: OT  role, benefits, POC, d/c planning, AAROM to R shoulder                   Point: Precautions (In Progress)     Description:   Instruct learner(s) on prescribed precautions during self-care and functional transfers.              Learning Progress Summary           Patient Acceptance, E,D, NL,NR by LS at 5/2/2023 0900    Acceptance, E, NR by MM at 5/1/2023 1625    Comment: OT role, benefits, POC, d/c planning, AAROM to R shoulder   Family Acceptance, E, NR by MM at 5/1/2023 1625    Comment: OT role, benefits, POC, d/c planning, AAROM to R shoulder                   Point: Body mechanics (In Progress)     Description:   Instruct learner(s) on proper positioning and spine alignment during self-care, functional mobility activities and/or exercises.              Learning Progress Summary           Patient Acceptance, E,D, NL,NR by LS at 5/2/2023 0900    Acceptance, E, NR by MM at 5/1/2023 1625    Comment: OT role, benefits, POC, d/c planning, AAROM to R shoulder   Family Acceptance, E, NR by MM at 5/1/2023 1625    Comment: OT role, benefits, POC, d/c planning, AAROM to R shoulder                               User Key     Initials Effective Dates Name Provider Type Discipline     06/16/21 -  Dorian Raines, OTR/L Occupational Therapist OT     06/20/22 -  Char Byrne OTR/L Occupational Therapist OT              OT Recommendation and Plan     Plan of Care Review  Plan of Care Reviewed With: patient  Progress: improving  Outcome Evaluation: OT tx completed. Pt in fowlers upon therapist arrival; A&O to person and place. Pt performed supine>sit utilizing bedrails with HOB elevated with Max A and verbal/visual/tactile cues for positioning. Pt completed oral hygiene task while seated EOB with SBA after set-up. Pt performed sit>supine utilizing bedrails with HOB elevated with Max A and verbal/visual/tactile cues for positioning. Pt continues to benefit from skilled OT intervention in order to address remaining deficits  in fxl mobility, fxl activity tolerance, balance, coordination, and strength. Recommend SNF at discharge.     Time Calculation:    Time Calculation- OT     Row Name 05/03/23 1320             Time Calculation- OT    OT Start Time 1320  -LS      OT Stop Time 1343  -LS      OT Time Calculation (min) 23 min  -      Total Timed Code Minutes- OT 23 minute(s)  -LS      OT Received On 05/03/23  -            User Key  (r) = Recorded By, (t) = Taken By, (c) = Cosigned By    Initials Name Provider Type     Char Byrne, OTR/L Occupational Therapist              Therapy Charges for Today     Code Description Service Date Service Provider Modifiers Qty    55702410743 HC OT SELF CARE/MGMT/TRAIN EA 15 MIN 5/2/2023 Char Byrne OTR/L GO 2    40705913237 HC OT SELF CARE/MGMT/TRAIN EA 15 MIN 5/3/2023 Char Byrne, OTR/L GO 2               Char Byrne OTR/L  5/3/2023

## 2023-05-03 NOTE — PLAN OF CARE
Goal Outcome Evaluation:  Plan of Care Reviewed With: patient        Progress: improving  Outcome Evaluation: OT tx completed. Pt in fowlers upon therapist arrival; A&O to person and place. Pt performed supine>sit utilizing bedrails with HOB elevated with Max A and verbal/visual/tactile cues for positioning. Pt completed oral hygiene task while seated EOB with SBA after set-up. Pt performed sit>supine utilizing bedrails with HOB elevated with Max A and verbal/visual/tactile cues for positioning. Pt continues to benefit from skilled OT intervention in order to address remaining deficits in fxl mobility, fxl activity tolerance, balance, coordination, and strength. Recommend SNF at discharge.

## 2023-05-03 NOTE — PLAN OF CARE
Goal Outcome Evaluation:  Plan of Care Reviewed With: patient        Progress: improving  Outcome Evaluation: Pt c/o pain x1 this shift. Medicated with PO pain meds per pt request. Safety maintained. A/o to person/place currently. Tolerating diet, meds crushed in AS. Dressing to zbigniew emilia CDI. N/v check WNL. Abrasion present to right arm, dressing in place. Bruising noted to right side. Voiding incont via brief. Plan to dc to NH today. Cont to monitor.

## 2023-05-04 ENCOUNTER — TELEPHONE (OUTPATIENT)
Dept: INTERNAL MEDICINE | Age: 88
End: 2023-05-04

## 2023-05-04 NOTE — THERAPY DISCHARGE NOTE
Acute Care - Occupational Therapy Discharge Summary  [facility]     Patient Name: Kamille Saul  : 3/22/1927  MRN: [id number]    Today's Date: 2023                 Admit Date: 2023        OT Recommendation and Plan    Visit Dx:    ICD-10-CM ICD-9-CM   1. Closed displaced fracture of right femoral neck  S72.001A 820.8   2. Impaired functional mobility and activity tolerance  Z74.09 V49.89   3. Decreased activities of daily living (ADL)  Z78.9 V49.89                OT Rehab Goals     Row Name 23 0900             Dressing Goal 1 (OT)    Activity/Device (Dressing Goal 1, OT) dressing skills, all  -LS      [place]/Cues Needed (Dressing Goal 1, OT) minimum assist (75% or more patient effort);set-up required;verbal cues required  -LS      Time Frame (Dressing Goal 1, OT) long term goal (LTG);by discharge  -LS      Progress/Outcome (Dressing Goal 1, OT) goal not met  -LS         Toileting Goal 1 (OT)    Activity/Device (Toileting Goal 1, OT) toileting skills, all;commode, bedside without drop arms  -LS      [place] Level/Cues Needed (Toileting Goal 1, OT) minimum assist (75% or more patient effort);set-up required;verbal cues required  -LS      Time Frame (Toileting Goal 1, OT) long term goal (LTG);by discharge  -LS      Progress/Outcome (Toileting Goal 1, OT) goal not met  -LS         Grooming Goal 1 (OT)    Activity/Device (Grooming Goal 1, OT) grooming skills, all  -LS      [place] (Grooming Goal 1, OT) minimum assist (75% or more patient effort);set-up required;verbal cues required  -LS      Time Frame (Grooming Goal 1, OT) long term goal (LTG);by discharge  -LS      Strategies/Barriers (Grooming Goal 1, OT) sitting EOB  -LS      Progress/Outcome (Grooming Goal 1, OT) goal not met  -LS            User Key  (r) = Recorded By, (t) = Taken By, (c) = Cosigned By    Initials Name Provider Type Discipline    LS Cinda Mtz COTA Occupational Therapist Assistant THERAPIES                  Outcome Measures     Row Name 05/03/23 1100 05/02/23 1400          How much help from another person do you currently need...    Turning from your back to your side while in flat bed without using bedrails? 1  -KJ 1  -KJ     Moving from lying on back to sitting on the side of a flat bed without bedrails? 1  -KJ 1  -KJ     Moving to and from a bed to a chair (including a wheelchair)? 2  -KJ 2  -KJ     Standing up from a chair using your arms (e.g., wheelchair, bedside chair)? 2  -KJ 2  -KJ     Climbing 3-5 steps with a railing? 1  -KJ 1  -KJ     To walk in hospital room? 2  -KJ 2  -KJ     AM-PAC 6 Clicks Score (PT) 9  -KJ 9  -KJ        Functional Assessment    Outcome Measure Options AM-PAC 6 Clicks Basic Mobility (PT)  -KJ AM-PAC 6 Clicks Basic Mobility (PT)  -KJ           User Key  (r) = Recorded By, (t) = Taken By, (c) = Cosigned By    Initials Name Provider Type    Ashley Monet, PTA Physical Therapist Assistant                        OT Discharge Summary  Anticipated Discharge Disposition (OT): skilled nursing facility  Reason for Discharge: Discharge from facility  Outcomes Achieved: Refer to plan of care for updates on goals achieved  Discharge Destination: SNF      MEGAN Daugherty  5/4/2023

## 2023-05-04 NOTE — TELEPHONE ENCOUNTER
SKILLED NURSING FACILITY:   INITIAL CALL POST-HOSPITAL DISCHARGE    SNF: [facility]     PHONE NUMBER: [phone]     / :    THERAPY:    ANTICIPATED LENGTH OF STAY: unknown

## 2023-05-04 NOTE — THERAPY DISCHARGE NOTE
Acute Care - Physical Therapy Discharge Summary  [facility]       Patient Name: Kamille Saul  : 3/22/1927  MRN: [id number]    Today's Date: 2023                 Admit Date: 2023      PT Recommendation and Plan    Visit Dx:    ICD-10-CM ICD-9-CM   1. Closed displaced fracture of right femoral neck  S72.001A 820.8   2. Impaired functional mobility and activity tolerance  Z74.09 V49.89   3. Decreased activities of daily living (ADL)  Z78.9 V49.89        Outcome Measures     Row Name 23 1100 23 1400          How much help from another person do you currently need...    Turning from your back to your side while in flat bed without using bedrails? 1  -KJ 1  -KJ     Moving from lying on back to sitting on the side of a flat bed without bedrails? 1  -KJ 1  -KJ     Moving to and from a bed to a chair (including a wheelchair)? 2  -KJ 2  -KJ     Standing up from a chair using your arms (e.g., wheelchair, bedside chair)? 2  -KJ 2  -KJ     Climbing 3-5 steps with a railing? 1  -KJ 1  -KJ     To walk in hospital room? 2  -KJ 2  -KJ     AM-PAC 6 Clicks Score (PT) 9  -KJ 9  -KJ        Functional Assessment    Outcome Measure Options AM-PAC 6 Clicks Basic Mobility (PT)  -KJ AM-PAC 6 Clicks Basic Mobility (PT)  -KJ           User Key  (r) = Recorded By, (t) = Taken By, (c) = Cosigned By    Initials Name Provider Type    Ashley Monet, PTA Physical Therapist Assistant                     PT Rehab Goals     Row Name 23 0700             Bed Mobility Goal 1 (PT)    Activity/Assistive Device (Bed Mobility Goal 1, PT) rolling to left;scooting;sit to supine/supine to sit  -AB      [place] Level/Cues Needed (Bed Mobility Goal 1, PT) minimum assist (75% or more patient effort)  -AB      Time Frame (Bed Mobility Goal 1, PT) long term goal (LTG);10 days  -AB      Progress/Outcomes (Bed Mobility Goal 1, PT) goal not met  -AB         Transfer Goal 1 (PT)    Activity/Assistive Device (Transfer Goal 1, PT)  sit-to-stand/stand-to-sit;bed-to-chair/chair-to-bed;walker, rolling  -AB      [place] Level/Cues Needed (Transfer Goal 1, PT) minimum assist (75% or more patient effort);contact guard required  -AB      Time Frame (Transfer Goal 1, PT) long term goal (LTG);10 days  -AB      Progress/Outcome (Transfer Goal 1, PT) goal partially met  -AB         Gait Training Goal 1 (PT)    Activity/Assistive Device (Gait Training Goal 1, PT) gait (walking locomotion);assistive device use;decrease fall risk;diminish gait deviation;improve balance and speed;increase endurance/gait distance;increase energy conservation;walker, rolling  -AB      [place] Level (Gait Training Goal 1, PT) minimum assist (75% or more patient effort);contact guard required  -AB      Distance (Gait Training Goal 1, PT) 8-10 ft  -AB      Time Frame (Gait Training Goal 1, PT) long term goal (LTG);10 days  -AB      Progress/Outcome (Gait Training Goal 1, PT) goal not met  -AB         ROM Goal 1 (PT)    ROM Goal 1 (PT) pt to perform 10-12 reps of AROM w/ the R LE for hs, hip ab/adduction, SAQ and LAQ  -AB      Time Frame (ROM Goal 1, PT) long-term goal (LTG);10 days  -AB      Progress/Outcome (ROM Goal 1, PT) goal partially met  -AB            User Key  (r) = Recorded By, (t) = Taken By, (c) = Cosigned By    Initials Name Provider Type Discipline    Araceli Amin PTA Physical Therapist Assistant PT                    PT Discharge Summary  Anticipated Discharge Disposition (PT): skilled nursing facility  Reason for Discharge: Discharge from facility  Outcomes Achieved: Refer to plan of care for updates on goals achieved  Discharge Destination: SNF      Araceli Phillips PTA   5/4/2023

## 2023-05-08 NOTE — ANESTHESIA POSTPROCEDURE EVALUATION
"Patient: Kamille Saul    Procedure Summary     Date: 04/30/23 Room / Location:  [facility] OR  /  [facility]    Anesthesia Start: 1129 Anesthesia Stop: 1317    Procedure: HIP HEMIARTHROPLASTY (Right: Hip) Diagnosis:       Closed displaced fracture of right femoral neck      (Closed displaced fracture of right femoral neck [S72.001A])    Surgeons: Germain Seo MD Provider: Jus Laughlin CRNA    Anesthesia Type: general ASA Status: 4 - Emergent          Anesthesia Type: general    Vitals  Vitals Value Taken Time   /72 04/30/23 1345   Temp 97.1 °F (36.2 °C) 04/30/23 1345   Pulse 99 04/30/23 1345   Resp 16 04/30/23 1345   SpO2 99 % 04/30/23 1345           Post Anesthesia Care and Evaluation    Patient location during evaluation: PACU  Patient participation: complete - patient participated  Level of consciousness: awake and alert  Pain management: adequate    Airway patency: patent  Anesthetic complications: No anesthetic complications    Cardiovascular status: acceptable  Respiratory status: acceptable  Hydration status: acceptable    Comments: Blood pressure 174/84, pulse 88, temperature 98.1 °F (36.7 °C), temperature source Oral, resp. rate 16, height 154.9 cm (61\"), weight 55.3 kg (122 lb), SpO2 92 %, not currently breastfeeding.    Pt discharged from PACU based on luis alfredo score >8      "

## 2023-05-09 ENCOUNTER — TELEPHONE (OUTPATIENT)
Dept: INTERNAL MEDICINE | Age: 88
End: 2023-05-09

## 2023-05-09 NOTE — TELEPHONE ENCOUNTER
[place], [phone]    Per Susu, nursing staff at [facility], patient is doing well. Patient continues to participate in therapy. No known discharge plans at this time.

## 2023-05-12 ENCOUNTER — HOSPITAL ENCOUNTER (EMERGENCY)
Facility: HOSPITAL | Age: 88
Discharge: HOME | End: 2023-05-12
Attending: EMERGENCY MEDICINE
Payer: MEDICARE

## 2023-05-12 ENCOUNTER — APPOINTMENT (OUTPATIENT)
Dept: GENERAL RADIOLOGY | Facility: HOSPITAL | Age: 88
End: 2023-05-12
Payer: MEDICARE

## 2023-05-12 VITALS
DIASTOLIC BLOOD PRESSURE: 48 MMHG | SYSTOLIC BLOOD PRESSURE: 137 MMHG | HEIGHT: 61 IN | TEMPERATURE: 97.5 F | BODY MASS INDEX: 22.09 KG/M2 | HEART RATE: 66 BPM | WEIGHT: 117 LBS | OXYGEN SATURATION: 98 % | RESPIRATION RATE: 14 BRPM

## 2023-05-12 DIAGNOSIS — S42.251D CLOSED DISPLACED FRACTURE OF GREATER TUBEROSITY OF RIGHT HUMERUS WITH ROUTINE HEALING, SUBSEQUENT ENCOUNTER: Primary | ICD-10-CM

## 2023-05-12 DIAGNOSIS — N18.9 CHRONIC KIDNEY DISEASE, UNSPECIFIED CKD STAGE: ICD-10-CM

## 2023-05-12 DIAGNOSIS — D72.825 BANDEMIA: ICD-10-CM

## 2023-05-12 LAB
ALBUMIN SERPL-MCNC: 3.1 G/DL (ref 3.5–5.2)
ALBUMIN/GLOB SERPL: 1 G/DL
ALP SERPL-CCNC: 152 U/L (ref 39–117)
ALT SERPL W P-5'-P-CCNC: 11 U/L (ref 1–33)
ANION GAP SERPL CALCULATED.3IONS-SCNC: 8 MMOL/L (ref 5–15)
ANISOCYTOSIS BLD QL: ABNORMAL
APTT PPP: 36.4 SECONDS (ref 24.1–35)
AST SERPL-CCNC: 17 U/L (ref 1–32)
BASOPHILS # BLD MANUAL: 0.22 10*3/MM3 (ref 0–0.2)
BASOPHILS NFR BLD MANUAL: 2 % (ref 0–1.5)
BILIRUB SERPL-MCNC: 0.4 MG/DL (ref 0–1.2)
BUN SERPL-MCNC: 38 MG/DL (ref 8–23)
BUN/CREAT SERPL: 23.8 (ref 7–25)
CALCIUM SPEC-SCNC: 9.3 MG/DL (ref 8.2–9.6)
CHLORIDE SERPL-SCNC: 100 MMOL/L (ref 98–107)
CO2 SERPL-SCNC: 28 MMOL/L (ref 22–29)
CREAT SERPL-MCNC: 1.6 MG/DL (ref 0.57–1)
D-LACTATE SERPL-SCNC: 1.6 MMOL/L (ref 0.5–2)
DEPRECATED RDW RBC AUTO: 58.9 FL (ref 37–54)
EGFRCR SERPLBLD CKD-EPI 2021: 29.4 ML/MIN/1.73
EOSINOPHIL # BLD MANUAL: 0.11 10*3/MM3 (ref 0–0.4)
EOSINOPHIL NFR BLD MANUAL: 1 % (ref 0.3–6.2)
ERYTHROCYTE [DISTWIDTH] IN BLOOD BY AUTOMATED COUNT: 16.2 % (ref 12.3–15.4)
GLOBULIN UR ELPH-MCNC: 3 GM/DL
GLUCOSE SERPL-MCNC: 120 MG/DL (ref 65–99)
HCT VFR BLD AUTO: 34.6 % (ref 34–46.6)
HGB BLD-MCNC: 10.2 G/DL (ref 12–15.9)
INR PPP: 1.2 (ref 0.91–1.09)
LYMPHOCYTES # BLD MANUAL: 1.91 10*3/MM3 (ref 0.7–3.1)
LYMPHOCYTES NFR BLD MANUAL: 6.1 % (ref 5–12)
MCH RBC QN AUTO: 29.7 PG (ref 26.6–33)
MCHC RBC AUTO-ENTMCNC: 29.5 G/DL (ref 31.5–35.7)
MCV RBC AUTO: 100.9 FL (ref 79–97)
METAMYELOCYTES NFR BLD MANUAL: 1 % (ref 0–0)
MONOCYTES # BLD: 0.68 10*3/MM3 (ref 0.1–0.9)
MYELOCYTES NFR BLD MANUAL: 1 % (ref 0–0)
NEUTROPHILS # BLD AUTO: 7.95 10*3/MM3 (ref 1.7–7)
NEUTROPHILS NFR BLD MANUAL: 68.7 % (ref 42.7–76)
NEUTS BAND NFR BLD MANUAL: 3 % (ref 0–5)
PLATELET # BLD AUTO: 532 10*3/MM3 (ref 140–450)
PMV BLD AUTO: 9 FL (ref 6–12)
POTASSIUM SERPL-SCNC: 4.3 MMOL/L (ref 3.5–5.2)
PROT SERPL-MCNC: 6.1 G/DL (ref 6–8.5)
PROTHROMBIN TIME: 15.4 SECONDS (ref 11.8–14.8)
RBC # BLD AUTO: 3.43 10*6/MM3 (ref 3.77–5.28)
ROULEAUX BLD QL SMEAR: ABNORMAL
SMALL PLATELETS BLD QL SMEAR: ABNORMAL
SODIUM SERPL-SCNC: 136 MMOL/L (ref 136–145)
VARIANT LYMPHS NFR BLD MANUAL: 17.2 % (ref 19.6–45.3)
WBC MORPH BLD: NORMAL
WBC NRBC COR # BLD: 11.08 10*3/MM3 (ref 3.4–10.8)

## 2023-05-12 PROCEDURE — 73030 X-RAY EXAM OF SHOULDER: CPT

## 2023-05-12 PROCEDURE — 87040 BLOOD CULTURE FOR BACTERIA: CPT | Performed by: EMERGENCY MEDICINE

## 2023-05-12 PROCEDURE — 83605 ASSAY OF LACTIC ACID: CPT | Performed by: EMERGENCY MEDICINE

## 2023-05-12 PROCEDURE — 85610 PROTHROMBIN TIME: CPT | Performed by: EMERGENCY MEDICINE

## 2023-05-12 PROCEDURE — 36415 COLL VENOUS BLD VENIPUNCTURE: CPT

## 2023-05-12 PROCEDURE — 85730 THROMBOPLASTIN TIME PARTIAL: CPT | Performed by: EMERGENCY MEDICINE

## 2023-05-12 PROCEDURE — 99284 EMERGENCY DEPT VISIT MOD MDM: CPT

## 2023-05-12 PROCEDURE — 73070 X-RAY EXAM OF ELBOW: CPT

## 2023-05-12 PROCEDURE — 63710000001 ONDANSETRON ODT 4 MG TABLET DISPERSIBLE: Performed by: EMERGENCY MEDICINE

## 2023-05-12 PROCEDURE — 85025 COMPLETE CBC W/AUTO DIFF WBC: CPT | Performed by: EMERGENCY MEDICINE

## 2023-05-12 PROCEDURE — 80053 COMPREHEN METABOLIC PANEL: CPT | Performed by: EMERGENCY MEDICINE

## 2023-05-12 PROCEDURE — 85007 BL SMEAR W/DIFF WBC COUNT: CPT | Performed by: EMERGENCY MEDICINE

## 2023-05-12 RX ORDER — NALOXONE HYDROCHLORIDE 4 MG/.1ML
SPRAY NASAL
Qty: 2 EACH | Refills: 0 | Status: SHIPPED | OUTPATIENT
Start: 2023-05-12

## 2023-05-12 RX ORDER — HYDROCODONE BITARTRATE AND ACETAMINOPHEN 5; 325 MG/1; MG/1
1 TABLET ORAL EVERY 6 HOURS PRN
Qty: 8 TABLET | Refills: 0 | Status: SHIPPED | OUTPATIENT
Start: 2023-05-12

## 2023-05-12 RX ORDER — HYDROCODONE BITARTRATE AND ACETAMINOPHEN 5; 325 MG/1; MG/1
1 TABLET ORAL ONCE
Status: COMPLETED | OUTPATIENT
Start: 2023-05-12 | End: 2023-05-12

## 2023-05-12 RX ORDER — SODIUM CHLORIDE 0.9 % (FLUSH) 0.9 %
10 SYRINGE (ML) INJECTION AS NEEDED
Status: DISCONTINUED | OUTPATIENT
Start: 2023-05-12 | End: 2023-05-12 | Stop reason: HOSPADM

## 2023-05-12 RX ORDER — ONDANSETRON 4 MG/1
4 TABLET, ORALLY DISINTEGRATING ORAL EVERY 6 HOURS PRN
Qty: 10 TABLET | Refills: 0 | Status: SHIPPED | OUTPATIENT
Start: 2023-05-12

## 2023-05-12 RX ORDER — ONDANSETRON 4 MG/1
4 TABLET, ORALLY DISINTEGRATING ORAL ONCE
Status: COMPLETED | OUTPATIENT
Start: 2023-05-12 | End: 2023-05-12

## 2023-05-12 RX ADMIN — ONDANSETRON 4 MG: 4 TABLET, ORALLY DISINTEGRATING ORAL at 13:09

## 2023-05-12 RX ADMIN — HYDROCODONE BITARTRATE AND ACETAMINOPHEN 1 TABLET: 5; 325 TABLET ORAL at 13:09

## 2023-05-12 NOTE — ED NOTES
[facility] notified pt ready for d/c   worsening lower pain radiating to left leg x 6 days, seen at [facility] yesterday

## 2023-05-12 NOTE — ED NOTES
The following fall interventions were initiated:    [x] Patient and/or family given education   [] Call light within reach and educated on how to use   [x] Bed rails up per protocol    [x] Bed locked and in the lowest position   [x] Bed alarm set and on loudest setting   [x] Fall wrist band applied   [x] Non skid footwear applied   [x] Room free of clutter   [x] Patient items within reach   [x] Adequate lighting provided  [] Falls sign present    [] Patient moved closer to nursing station   [] Restraints applied

## 2023-05-12 NOTE — ED PROVIDER NOTES
Subjective   History of Present Illness  Patient presents to the emergency department from [facility] with her daughter at bedside to be evaluated for pain to right shoulder and right elbow after a fall 2 weeks ago.  Patient had a right hip fracture at that time and is currently at a nursing home for rehabilitation after surgery to repair her right hip fracture.  Patient notes that her right shoulder and right elbow have been hurting since the fall which fractured her hip and she has been unable to participate very much in rehabilitation due to pain in her right shoulder and elbow.  Patient with facial bruising and negative CT head/cervical spine 2 weeks ago.  Patient denies any headache, neck pain, facial pain.  Patient was also sent in for leukocytosis.  Additionally patient had a urinalysis earlier today which is unremarkable.  Patient denies fatigue, fever, chest pain, shortness breath, numbness, tingling, weakness.    History provided by:  Patient and relative   used: No        Review of Systems   Musculoskeletal:        Pain to right elbow and shoulder   All other systems reviewed and are negative.      Past Medical History:   Diagnosis Date   • Actinic keratosis of scalp    • Anemia    • Arthritis    • Chronic kidney disease, stage III (moderate)    • Eczema    • Fibromyalgia    • Glaucoma     Right eye   • Hx of migraine headaches    • Hypertension    • Hypothyroidism    • Squamous cell carcinoma of scalp        Allergies   Allergen Reactions   • Biaxin [Clarithromycin] Unknown (See Comments)     PT cannot recall       Past Surgical History:   Procedure Laterality Date   • APPENDECTOMY     • CATARACT EXTRACTION     • ENDOSCOPY  06/07/2007    mild gastritis otherwise normal upper endoscopy   • HIP HEMIARTHROPLASTY Right 4/30/2023    Procedure: HIP HEMIARTHROPLASTY;  Surgeon: Germain Seo MD;  Location: [facility];  Service: Orthopedics;  Laterality: Right;   • HYSTERECTOMY     • SKIN GRAFT     •  SKIN LESION EXCISION      cyst removal    • TONSILLECTOMY         Family History   Problem Relation Age of Onset   • Thrombocytopenia Mother    • Skin cancer Father        Social History     Socioeconomic History   • Marital status:    Tobacco Use   • Smoking status: Former   • Smokeless tobacco: Never   Vaping Use   • Vaping Use: Never used   Substance and Sexual Activity   • Alcohol use: No     Comment: Occasional wine   • Drug use: No           Objective   Physical Exam  Vitals and nursing note reviewed.   Constitutional:       General: She is not in acute distress.     Appearance: She is well-developed. She is not ill-appearing or toxic-appearing.   HENT:      Head: Normocephalic.      Comments: Old appearing bruising to the face     Nose: Nose normal. No congestion.      Mouth/Throat:      Mouth: Mucous membranes are moist.   Eyes:      Extraocular Movements: Extraocular movements intact.      Pupils: Pupils are equal, round, and reactive to light.   Cardiovascular:      Rate and Rhythm: Normal rate and regular rhythm.      Pulses: Normal pulses.   Pulmonary:      Effort: Pulmonary effort is normal.      Breath sounds: Normal breath sounds. No wheezing.   Abdominal:      Palpations: Abdomen is soft.      Tenderness: There is no abdominal tenderness.   Musculoskeletal:         General: Tenderness (Left shoulder and elbow) present.      Cervical back: Normal range of motion. No rigidity or tenderness.      Comments: Decreased range of motion right shoulder and right elbow   Skin:     General: Skin is warm and dry.      Capillary Refill: Capillary refill takes less than 2 seconds.   Neurological:      General: No focal deficit present.      Mental Status: She is alert and oriented to person, place, and time. Mental status is at baseline.      Motor: No weakness.   Psychiatric:         Mood and Affect: Mood normal.         Behavior: Behavior normal.         Thought Content: Thought content normal.          Judgment: Judgment normal.         Procedures           ED Course  ED Course as of 05/12/23 1700   Fri May 12, 2023   1619 XR Shoulder 2+ View Right  Impression:   1. There is a minimally distracted greater tuberosity fracture fragment,  likely avulsion-type fracture.   2. No surgical neck fracture. Normal alignment at the glenohumeral  joint. [JG]   1619 XR Elbow 2 View Right  Impression:   1. No acute osseous injury or malalignment.    [JG]   1648 Labs are chronic renal insufficiency.  Labs also show leukocytosis and bandemia which is improved from 8 days ago.  Left shoulder x-ray shows a minimally distracted right greater tuberosity fracture.  Right elbow x-ray shows no fracture.  Right upper extremity is placed in a sling.  Patient neurovascular intact before and after sling was applied.  Patient will be referred to her orthopedic surgeon.  I have also recommended that patient have CBC and BMP repeated in 48 hours.  Patient given prescriptions for Zofran, Norco, and Narcan.  Patient is well-appearing and comfortable at time of discharge.  Patient and daughter are agreeable with plan of care. [JG]      ED Course User Index  [JG] Germain Hutton, DO                                           [facility]    Final diagnoses:   Closed displaced fracture of greater tuberosity of right humerus with routine healing, subsequent encounter   Chronic kidney disease, unspecified CKD stage   Bandemia       ED Disposition  ED Disposition     ED Disposition   Discharge    Condition   Stable    Comment   --             Shelley Tidwell MD  [address] [place] [address] [address]  [facility] [address]  [phone]    Schedule an appointment as soon as possible for a visit in 2 days      Germain Seo MD  [address] [facility] [address]  [phone]    Schedule an appointment as soon as possible for a visit in 2 days           Medication List      New Prescriptions    naloxone 4 MG/0.1ML nasal spray  Commonly known as: NARCAN  Call 911. Don't  prime. Spray in 1 nostril for overdose. Repeat in 2-3 minutes in other nostril if no or minimal breathing/responsiveness.     ondansetron ODT 4 MG disintegrating tablet  Commonly known as: ZOFRAN-ODT  Place 1 tablet on the tongue Every 6 (Six) Hours As Needed for Nausea for up to 10 doses.        Changed    * HYDROcodone-acetaminophen 5-325 MG per tablet  Commonly known as: NORCO  Take 1 tablet by mouth Every 8 (Eight) Hours As Needed for Moderate Pain.  What changed: Another medication with the same name was added. Make sure you understand how and when to take each.     * HYDROcodone-acetaminophen 5-325 MG per tablet  Commonly known as: NORCO  Take 1 tablet by mouth Every 6 (Six) Hours As Needed for Moderate Pain for up to 8 doses.  What changed: You were already taking a medication with the same name, and this prescription was added. Make sure you understand how and when to take each.         * This list has 2 medication(s) that are the same as other medications prescribed for you. Read the directions carefully, and ask your doctor or other care provider to review them with you.               Where to Get Your Medications      These medications were sent to [facility] - [place], [place] - [address] [facility] - [phone]  - [phone] FX  [address] [facility] [address]    Phone: [phone]   · HYDROcodone-acetaminophen 5-325 MG per tablet  · naloxone 4 MG/0.1ML nasal spray  · ondansetron ODT 4 MG disintegrating tablet          Germain Hutton DO  05/12/23 3019

## 2023-05-12 NOTE — DISCHARGE INSTRUCTIONS
Read all instructions in this handout.   If you received prescriptions, fill all prescriptions and take as directed.   Call your primary care physician and orthopedic surgeon for a follow up appointment in 1-2 days.   Have your primary care physician repeat a CBC and BMP in 48 hours to reevaluate your chronic kidney disease as well as your leukocytosis/bandemia (elevated white blood cell count).    Remain nonweightbearing of right upper extremity until cleared by orthopedic surgeon.    Return to the emergency department as soon as possible for worsening of your symptoms or for any other concerns that you may have.

## 2023-05-13 ENCOUNTER — APPOINTMENT (OUTPATIENT)
Dept: GENERAL RADIOLOGY | Facility: HOSPITAL | Age: 88
End: 2023-05-13
Payer: MEDICARE

## 2023-05-13 ENCOUNTER — APPOINTMENT (OUTPATIENT)
Dept: CT IMAGING | Facility: HOSPITAL | Age: 88
End: 2023-05-13
Payer: MEDICARE

## 2023-05-13 ENCOUNTER — HOSPITAL ENCOUNTER (EMERGENCY)
Facility: HOSPITAL | Age: 88
Discharge: HOME | End: 2023-05-13
Attending: STUDENT IN AN ORGANIZED HEALTH CARE EDUCATION/TRAINING PROGRAM
Payer: MEDICARE

## 2023-05-13 VITALS
OXYGEN SATURATION: 91 % | SYSTOLIC BLOOD PRESSURE: 105 MMHG | DIASTOLIC BLOOD PRESSURE: 59 MMHG | WEIGHT: 115 LBS | HEART RATE: 72 BPM | BODY MASS INDEX: 22.58 KG/M2 | RESPIRATION RATE: 18 BRPM | TEMPERATURE: 98.1 F | HEIGHT: 60 IN
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
## 2023-05-13 PROCEDURE — 99283 EMERGENCY DEPT VISIT LOW MDM: CPT

## 2023-05-13 PROCEDURE — 25010000002 TETANUS-DIPHTH-ACELL PERTUSSIS 5-2.5-18.5 LF-MCG/0.5 SUSPENSION PREFILLED SYRINGE: Performed by: STUDENT IN AN ORGANIZED HEALTH CARE EDUCATION/TRAINING PROGRAM

## 2023-05-13 PROCEDURE — 25010000002 FENTANYL CITRATE (PF) 50 MCG/ML SOLUTION: Performed by: STUDENT IN AN ORGANIZED HEALTH CARE EDUCATION/TRAINING PROGRAM

## 2023-05-13 PROCEDURE — 72125 CT NECK SPINE W/O DYE: CPT

## 2023-05-13 PROCEDURE — 73090 X-RAY EXAM OF FOREARM: CPT

## 2023-05-13 PROCEDURE — 71045 X-RAY EXAM CHEST 1 VIEW: CPT

## 2023-05-13 PROCEDURE — 70450 CT HEAD/BRAIN W/O DYE: CPT

## 2023-05-13 PROCEDURE — 72170 X-RAY EXAM OF PELVIS: CPT

## 2023-05-13 PROCEDURE — 96376 TX/PRO/DX INJ SAME DRUG ADON: CPT

## 2023-05-13 PROCEDURE — 90471 IMMUNIZATION ADMIN: CPT | Performed by: STUDENT IN AN ORGANIZED HEALTH CARE EDUCATION/TRAINING PROGRAM

## 2023-05-13 PROCEDURE — 73060 X-RAY EXAM OF HUMERUS: CPT

## 2023-05-13 PROCEDURE — 90715 TDAP VACCINE 7 YRS/> IM: CPT | Performed by: STUDENT IN AN ORGANIZED HEALTH CARE EDUCATION/TRAINING PROGRAM

## 2023-05-13 PROCEDURE — 99284 EMERGENCY DEPT VISIT MOD MDM: CPT

## 2023-05-13 PROCEDURE — 96374 THER/PROPH/DIAG INJ IV PUSH: CPT

## 2023-05-13 PROCEDURE — 73070 X-RAY EXAM OF ELBOW: CPT

## 2023-05-13 RX ORDER — FENTANYL CITRATE 50 UG/ML
50 INJECTION, SOLUTION INTRAMUSCULAR; INTRAVENOUS ONCE
Status: COMPLETED | OUTPATIENT
Start: 2023-05-13 | End: 2023-05-13

## 2023-05-13 RX ORDER — LIDOCAINE HYDROCHLORIDE AND EPINEPHRINE BITARTRATE 20; .01 MG/ML; MG/ML
10 INJECTION, SOLUTION SUBCUTANEOUS ONCE
Status: COMPLETED | OUTPATIENT
Start: 2023-05-13 | End: 2023-05-13

## 2023-05-13 RX ADMIN — FENTANYL CITRATE 50 MCG: 50 INJECTION INTRAMUSCULAR; INTRAVENOUS at 06:03

## 2023-05-13 RX ADMIN — LIDOCAINE HYDROCHLORIDE,EPINEPHRINE BITARTRATE 10 ML: 20; .01 INJECTION, SOLUTION INFILTRATION; PERINEURAL at 06:40

## 2023-05-13 RX ADMIN — TETANUS TOXOID, REDUCED DIPHTHERIA TOXOID AND ACELLULAR PERTUSSIS VACCINE, ADSORBED 0.5 ML: 5; 2.5; 8; 8; 2.5 SUSPENSION INTRAMUSCULAR at 05:00

## 2023-05-13 RX ADMIN — FENTANYL CITRATE 50 MCG: 50 INJECTION INTRAMUSCULAR; INTRAVENOUS at 04:31

## 2023-05-13 NOTE — DISCHARGE INSTRUCTIONS
Follow-up with an orthopedic surgeon regarding your humerus fracture as planned.  Follow-up with wound care regarding the cut on your right forearm.  Come back for bleeding, or any emergencies.

## 2023-05-13 NOTE — ED NOTES
Right forearm/elbow hematoma/skin tear bandaged with petroleum gauze, nonadherent dressing, kerlix and coban. Pt tolerated well. Pre and post pmsx4.

## 2023-05-13 NOTE — ED NOTES
Called [facility] and spoke with patricia chinchilla gave report for transfer back. They said that we set up transport back.

## 2023-05-13 NOTE — ED PROVIDER NOTES
Subjective   History of Present Illness    Review of Systems    Past Medical History:   Diagnosis Date   • Actinic keratosis of scalp    • Anemia    • Arthritis    • Chronic kidney disease, stage III (moderate)    • Eczema    • Fibromyalgia    • Glaucoma     Right eye   • Hx of migraine headaches    • Hypertension    • Hypothyroidism    • Squamous cell carcinoma of scalp        Allergies   Allergen Reactions   • Biaxin [Clarithromycin] Unknown (See Comments)     PT cannot recall       Past Surgical History:   Procedure Laterality Date   • APPENDECTOMY     • CATARACT EXTRACTION     • ENDOSCOPY  06/07/2007    mild gastritis otherwise normal upper endoscopy   • HIP HEMIARTHROPLASTY Right 4/30/2023    Procedure: HIP HEMIARTHROPLASTY;  Surgeon: Germain Seo MD;  Location: [facility];  Service: Orthopedics;  Laterality: Right;   • HYSTERECTOMY     • SKIN GRAFT     • SKIN LESION EXCISION      cyst removal    • TONSILLECTOMY         Family History   Problem Relation Age of Onset   • Thrombocytopenia Mother    • Skin cancer Father        Social History     Socioeconomic History   • Marital status:    Tobacco Use   • Smoking status: Former   • Smokeless tobacco: Never   Vaping Use   • Vaping Use: Never used   Substance and Sexual Activity   • Alcohol use: No     Comment: Occasional wine   • Drug use: No           Objective   Physical Exam    Procedures           ED Course                                           MDM    Final diagnoses:   None       ED Disposition  ED Disposition     None          No follow-up provider specified.       Medication List      No changes were made to your prescriptions during this visit.        Smoking status: Former    Smokeless tobacco: Never   Vaping Use    Vaping Use: Never used   Substance and Sexual Activity    Alcohol use: No     Comment: Occasional wine    Drug use: No           Objective   Physical Exam  Vitals reviewed.   Constitutional:       General: She is not in acute distress.  HENT:      Head: Normocephalic.   Eyes:      Extraocular Movements: Extraocular movements intact.      Conjunctiva/sclera: Conjunctivae normal.   Cardiovascular:      Pulses: Normal pulses.      Heart sounds: Normal heart sounds.   Pulmonary:      Effort: Pulmonary effort is normal. No respiratory distress.   Abdominal:      General: Abdomen is flat. There is no distension.   Musculoskeletal:      Cervical back: Normal range of motion and neck supple.   Skin:     General: Skin is warm and dry.   Neurological:      General: No focal deficit present.      Mental Status: She is alert. Mental status is at baseline.   Psychiatric:         Behavior: Behavior normal.         Thought Content: Thought content normal.   Scalp atraumatic.   Forehead atraumatic, stable to palpation, nontender.   Anterior facial bruising pt states similar to previous  Midface stable, nontender.   No intraoral injuries noted.   No otorrhea.   Trachea midline, breath sounds were noted bilaterally. PERRL.    Cervical spine: no midline tenderness to palpation. No paraspinal tenderness to palpation.  Thoracic spine: no midline tenderness to palpation. No paraspinal tenderness to palpation.  Lumbar spine: no midline tenderness to palpation. No paraspinal tenderness to palpation.  Spine stable with no palpable deformity or step-off    Clavicles stable and nontender to AP and lateral compression.  Chest stable and nontender to AP and lateral compression.  Pelvis stable and nontender to lateral compression.    Extremities are warm, well-perfused with capillary refill intact and no gross motor deficits.   The right proximal forearm has a significant  hematoma with a large skin tear overlying; bleeding is controlled with a dressing.  Distally neurovascular intact.    Procedures           ED Course                                           Medical Decision Making  Problems Addressed:  Fall, initial encounter: complicated acute illness or injury    Amount and/or Complexity of Data Reviewed  Radiology: ordered.    Risk  Prescription drug management.    Kamille Saul is a 89 y.o. female with PMH above who presents to the Emergency Department due to fall.     Fall characterized as mechanical in nature; H&P does not reveal differential etiology such as presyncope, syncope, evidence of arrhythmia, neurologic symptoms such as dizziness.    Imaging studies ordered per physical exam to evaluate for traumatic injury.     ED Course:   -Imaging studies redemonstrate old fractures, no new fractures.  I attended to the repair the skin tears that that was so large but interrupted sutures and mattress sutures were unable to hold the skin together and her skin has been enough that it tears through.  The nurse dressed the wound; see his note for details. No further workup indicated at this time. Patient is stable and appropriate for discharge. Patient received strict return precautions per discharge instructions and was instructed to follow-up with their primary care provider regarding their ER visit.      Final diagnosis: fall, forearm injury    All questions answered. Patient/family was understanding and in agreement with today's assessment and plan. The patient was monitored during their stay in the ED and dispositioned without acute event.    Electronically signed by:  Dusty Clarke MD 5/17/2023 10:02 CDT      Note: Dragon medical dictation software was used in the creation of this note.        Final diagnoses:   Fall, initial encounter       ED Disposition  ED Disposition       ED Disposition   Discharge    Condition   Stable    Comment   --               Shelley Tidwell,  MD  [address] [place] [address] [address]  [facility] [address]  [phone]          [facility]  [address] [place] [address] [address]  [facility] [id number] [phone]             Medication List      No changes were made to your prescriptions during this visit.          Dusty Clarke MD  05/17/23 1002

## 2023-05-16 ENCOUNTER — TELEPHONE (OUTPATIENT)
Dept: INTERNAL MEDICINE | Age: 88
End: 2023-05-16

## 2023-05-16 NOTE — TELEPHONE ENCOUNTER
[place], [phone]    Per Janki Valdez, nursing staff at [facility], patient is doing well. Patient continues to participate in therapy. No known discharge plans at this time.

## 2023-05-17 LAB
BACTERIA SPEC AEROBE CULT: NORMAL
BACTERIA SPEC AEROBE CULT: NORMAL

## 2023-05-23 ENCOUNTER — TELEPHONE (OUTPATIENT)
Dept: INTERNAL MEDICINE | Age: 88
End: 2023-05-23

## 2023-05-23 NOTE — TELEPHONE ENCOUNTER
[place], [phone]    Per Susu, nursing staff at [facility], patient is doing well. Patient continues to participate in therapy. No known discharge plans at this time.

## 2023-05-31 ENCOUNTER — LAB REQUISITION (OUTPATIENT)
Dept: LAB | Facility: HOSPITAL | Age: 88
End: 2023-05-31
Payer: MEDICARE

## 2023-05-31 ENCOUNTER — OFFICE VISIT (OUTPATIENT)
Dept: WOUND CARE | Facility: HOSPITAL | Age: 88
End: 2023-05-31
Payer: MEDICARE

## 2023-05-31 ENCOUNTER — TELEPHONE (OUTPATIENT)
Dept: INTERNAL MEDICINE | Age: 88
End: 2023-05-31

## 2023-05-31 PROCEDURE — 87176 TISSUE HOMOGENIZATION CULTR: CPT | Performed by: SURGERY

## 2023-05-31 PROCEDURE — G0463 HOSPITAL OUTPT CLINIC VISIT: HCPCS

## 2023-05-31 PROCEDURE — 87205 SMEAR GRAM STAIN: CPT | Performed by: SURGERY

## 2023-05-31 PROCEDURE — 87075 CULTR BACTERIA EXCEPT BLOOD: CPT | Performed by: SURGERY

## 2023-05-31 PROCEDURE — 87070 CULTURE OTHR SPECIMN AEROBIC: CPT | Performed by: SURGERY

## 2023-05-31 NOTE — TELEPHONE ENCOUNTER
[place], [phone]    Per Bennie Carlin, nursing staff at [facility], patient is doing well. Patient continues to participate in therapy. No known discharge plans at this time.

## 2023-06-03 LAB
BACTERIA SPEC AEROBE CULT: NORMAL
GRAM STN SPEC: NORMAL
GRAM STN SPEC: NORMAL

## 2023-06-05 LAB — BACTERIA SPEC ANAEROBE CULT: NORMAL

## 2023-06-07 ENCOUNTER — OFFICE VISIT (OUTPATIENT)
Dept: WOUND CARE | Facility: HOSPITAL | Age: 88
End: 2023-06-07
Payer: MEDICARE

## 2023-06-14 ENCOUNTER — OFFICE VISIT (OUTPATIENT)
Dept: WOUND CARE | Facility: HOSPITAL | Age: 88
End: 2023-06-14
Payer: MEDICARE

## 2023-06-14 ENCOUNTER — PROCEDURE (OUTPATIENT)
Facility: LOCATION | Age: 88
End: 2023-06-14
Payer: MEDICARE

## 2023-06-14 DIAGNOSIS — H35.3211 EXUDATIVE AGE-RELATED MACULAR DEGENERATION, RIGHT EYE, WITH ACTIVE CHOROIDAL NEOVASCULARIZATION: Primary | ICD-10-CM

## 2023-06-14 PROCEDURE — 67028 INJECTION EYE DRUG: CPT | Performed by: OPHTHALMOLOGY

## 2023-06-14 PROCEDURE — G0463 HOSPITAL OUTPT CLINIC VISIT: HCPCS

## 2023-06-14 PROCEDURE — 92134 CPTRZ OPH DX IMG PST SGM RTA: CPT | Performed by: OPHTHALMOLOGY

## 2023-06-14 ASSESSMENT — INTRAOCULAR PRESSURE
OD: 14
OS: 14

## 2023-06-20 ENCOUNTER — TELEPHONE (OUTPATIENT)
Dept: INTERNAL MEDICINE | Age: 88
End: 2023-06-20

## 2023-06-20 NOTE — TELEPHONE ENCOUNTER
[place], [phone]    Per Susu, nursing staff [facility], patient is doing well. Patient continues to participate in therapy. No known discharge plans at this time.

## 2023-06-26 ENCOUNTER — TELEPHONE (OUTPATIENT)
Dept: INTERNAL MEDICINE CLINIC | Age: 88
End: 2023-06-26

## 2023-06-27 ENCOUNTER — TELEPHONE (OUTPATIENT)
Dept: INTERNAL MEDICINE | Age: 88
End: 2023-06-27

## 2023-07-14 ENCOUNTER — TELEPHONE (OUTPATIENT)
Dept: INTERNAL MEDICINE | Age: 88
End: 2023-07-14

## 2023-07-14 RX ORDER — BISACODYL 10 MG
10 SUPPOSITORY, RECTAL RECTAL DAILY PRN
COMMUNITY
Start: 2023-05-03

## 2023-07-14 RX ORDER — LATANOPROST 50 UG/ML
1 SOLUTION/ DROPS OPHTHALMIC NIGHTLY
COMMUNITY

## 2023-07-14 NOTE — TELEPHONE ENCOUNTER
I will lay the list on your desk.    The medications on Nursing Home list that are not in our list are Ferrous sulfate, Imodium, Protonix, sennosides-docusate, and systane eyedrops

## 2023-07-14 NOTE — TELEPHONE ENCOUNTER
----- Message from Oseas Finch sent at 7/14/2023 10:26 AM CDT -----  Subject: Message to Provider    QUESTIONS  Information for Provider? Patient's daughter Colt Stovall is requesting to   speak with patient's PCP regarding her concerns with the medications she   is taking. She would like to compare what she should be taking versus what   the nursing home is providing.  ---------------------------------------------------------------------------  --------------  [address] [place]  [phone]; OK to leave message on voicemail  ---------------------------------------------------------------------------  --------------  SCRIPT ANSWERS  Relationship to Patient? Other/Third Party  Representative Name? Richi Reynolds  Is the representative on the Communication Release of Information (RUFUS)   form in Epic?  Yes

## 2023-07-20 ENCOUNTER — LAB REQUISITION (OUTPATIENT)
Dept: LAB | Facility: HOSPITAL | Age: 88
End: 2023-07-20
Payer: MEDICARE

## 2023-07-20 LAB
ANION GAP SERPL CALCULATED.3IONS-SCNC: 14 MMOL/L (ref 5–15)
ANISOCYTOSIS BLD QL: ABNORMAL
BASOPHILS # BLD MANUAL: 0.07 10*3/MM3 (ref 0–0.2)
BASOPHILS NFR BLD MANUAL: 1 % (ref 0–1.5)
BUN SERPL-MCNC: 28 MG/DL (ref 8–23)
BUN/CREAT SERPL: 16 (ref 7–25)
CALCIUM SPEC-SCNC: 10.1 MG/DL (ref 8.2–9.6)
CHLORIDE SERPL-SCNC: 97 MMOL/L (ref 98–107)
CO2 SERPL-SCNC: 25 MMOL/L (ref 22–29)
CREAT SERPL-MCNC: 1.75 MG/DL (ref 0.57–1)
DEPRECATED RDW RBC AUTO: 56 FL (ref 37–54)
EGFRCR SERPLBLD CKD-EPI 2021: 26.4 ML/MIN/1.73
EOSINOPHIL # BLD MANUAL: 0.99 10*3/MM3 (ref 0–0.4)
EOSINOPHIL NFR BLD MANUAL: 14.3 % (ref 0.3–6.2)
ERYTHROCYTE [DISTWIDTH] IN BLOOD BY AUTOMATED COUNT: 15.1 % (ref 12.3–15.4)
GLUCOSE SERPL-MCNC: 103 MG/DL (ref 65–99)
HCT VFR BLD AUTO: 40.2 % (ref 34–46.6)
HGB BLD-MCNC: 12 G/DL (ref 12–15.9)
LYMPHOCYTES # BLD MANUAL: 1.47 10*3/MM3 (ref 0.7–3.1)
LYMPHOCYTES NFR BLD MANUAL: 11.2 % (ref 5–12)
MCH RBC QN AUTO: 30 PG (ref 26.6–33)
MCHC RBC AUTO-ENTMCNC: 29.9 G/DL (ref 31.5–35.7)
MCV RBC AUTO: 100.5 FL (ref 79–97)
MONOCYTES # BLD: 0.77 10*3/MM3 (ref 0.1–0.9)
MYELOCYTES NFR BLD MANUAL: 1 % (ref 0–0)
NEUTROPHILS # BLD AUTO: 3.51 10*3/MM3 (ref 1.7–7)
NEUTROPHILS NFR BLD MANUAL: 51 % (ref 42.7–76)
PLAT MORPH BLD: NORMAL
PLATELET # BLD AUTO: 364 10*3/MM3 (ref 140–450)
PMV BLD AUTO: 9.3 FL (ref 6–12)
POTASSIUM SERPL-SCNC: 4.8 MMOL/L (ref 3.5–5.2)
RBC # BLD AUTO: 4 10*6/MM3 (ref 3.77–5.28)
SODIUM SERPL-SCNC: 136 MMOL/L (ref 136–145)
VARIANT LYMPHS NFR BLD MANUAL: 19.4 % (ref 19.6–45.3)
VARIANT LYMPHS NFR BLD MANUAL: 2 % (ref 0–5)
WBC MORPH BLD: NORMAL
WBC NRBC COR # BLD: 6.89 10*3/MM3 (ref 3.4–10.8)

## 2023-07-20 PROCEDURE — 85027 COMPLETE CBC AUTOMATED: CPT | Performed by: INTERNAL MEDICINE

## 2023-07-20 PROCEDURE — 85007 BL SMEAR W/DIFF WBC COUNT: CPT | Performed by: INTERNAL MEDICINE

## 2023-07-20 PROCEDURE — 80048 BASIC METABOLIC PNL TOTAL CA: CPT | Performed by: INTERNAL MEDICINE

## 2023-07-20 PROCEDURE — 36415 COLL VENOUS BLD VENIPUNCTURE: CPT | Performed by: INTERNAL MEDICINE

## 2023-07-26 ENCOUNTER — OFFICE VISIT (OUTPATIENT)
Facility: LOCATION | Age: 88
End: 2023-07-26
Payer: MEDICARE

## 2023-07-26 DIAGNOSIS — H35.3122 NEXDTVE AGE-RELATED MCLR DEGN, LEFT EYE, INTERMED DRY STAGE: ICD-10-CM

## 2023-07-26 DIAGNOSIS — H35.372 PUCKERING OF MACULA, LEFT EYE: ICD-10-CM

## 2023-07-26 DIAGNOSIS — H35.3211 EXDTVE AGE-REL MCLR DEGN, RIGHT EYE, WITH ACTV CHRDL NEOVAS: ICD-10-CM

## 2023-07-26 DIAGNOSIS — Z96.1 PRESENCE OF PSEUDOPHAKIA: Primary | ICD-10-CM

## 2023-07-26 PROCEDURE — 67028 INJECTION EYE DRUG: CPT | Performed by: OPHTHALMOLOGY

## 2023-07-26 PROCEDURE — 92014 COMPRE OPH EXAM EST PT 1/>: CPT | Performed by: OPHTHALMOLOGY

## 2023-07-26 ASSESSMENT — INTRAOCULAR PRESSURE
OS: 14
OD: 14

## 2023-09-06 ENCOUNTER — LAB REQUISITION (OUTPATIENT)
Dept: LAB | Facility: HOSPITAL | Age: 88
End: 2023-09-06
Payer: MEDICARE

## 2023-09-06 ENCOUNTER — PROCEDURE (OUTPATIENT)
Facility: LOCATION | Age: 88
End: 2023-09-06
Payer: MEDICARE

## 2023-09-06 DIAGNOSIS — H35.3211 EXUDATIVE AGE-RELATED MACULAR DEGENERATION, RIGHT EYE, WITH ACTIVE CHOROIDAL NEOVASCULARIZATION: Primary | ICD-10-CM

## 2023-09-06 LAB
ANION GAP SERPL CALCULATED.3IONS-SCNC: 11 MMOL/L (ref 5–15)
BUN SERPL-MCNC: 33 MG/DL (ref 8–23)
BUN/CREAT SERPL: 19.4 (ref 7–25)
CALCIUM SPEC-SCNC: 10.3 MG/DL (ref 8.2–9.6)
CHLORIDE SERPL-SCNC: 101 MMOL/L (ref 98–107)
CLUMPED PLATELETS: PRESENT
CO2 SERPL-SCNC: 24 MMOL/L (ref 22–29)
CREAT SERPL-MCNC: 1.7 MG/DL (ref 0.57–1)
DEPRECATED RDW RBC AUTO: 55.3 FL (ref 37–54)
EGFRCR SERPLBLD CKD-EPI 2021: 27.3 ML/MIN/1.73
EOSINOPHIL # BLD MANUAL: 0.33 10*3/MM3 (ref 0–0.4)
EOSINOPHIL NFR BLD MANUAL: 4.1 % (ref 0.3–6.2)
ERYTHROCYTE [DISTWIDTH] IN BLOOD BY AUTOMATED COUNT: 15.6 % (ref 12.3–15.4)
GLUCOSE SERPL-MCNC: 114 MG/DL (ref 65–99)
HCT VFR BLD AUTO: 38.6 % (ref 34–46.6)
HGB BLD-MCNC: 11.9 G/DL (ref 12–15.9)
LYMPHOCYTES # BLD MANUAL: 1.4 10*3/MM3 (ref 0.7–3.1)
LYMPHOCYTES NFR BLD MANUAL: 8.2 % (ref 5–12)
MCH RBC QN AUTO: 29.8 PG (ref 26.6–33)
MCHC RBC AUTO-ENTMCNC: 30.8 G/DL (ref 31.5–35.7)
MCV RBC AUTO: 96.7 FL (ref 79–97)
MONOCYTES # BLD: 0.66 10*3/MM3 (ref 0.1–0.9)
NEUTROPHILS # BLD AUTO: 5.69 10*3/MM3 (ref 1.7–7)
NEUTROPHILS NFR BLD MANUAL: 70.4 % (ref 42.7–76)
PLATELET # BLD AUTO: 303 10*3/MM3 (ref 140–450)
PMV BLD AUTO: 9.5 FL (ref 6–12)
POLYCHROMASIA BLD QL SMEAR: ABNORMAL
POTASSIUM SERPL-SCNC: 4.2 MMOL/L (ref 3.5–5.2)
RBC # BLD AUTO: 3.99 10*6/MM3 (ref 3.77–5.28)
SODIUM SERPL-SCNC: 136 MMOL/L (ref 136–145)
T4 FREE SERPL-MCNC: 1.43 NG/DL (ref 0.93–1.7)
TSH SERPL DL<=0.05 MIU/L-ACNC: 1.99 UIU/ML (ref 0.27–4.2)
VARIANT LYMPHS NFR BLD MANUAL: 15.3 % (ref 19.6–45.3)
VARIANT LYMPHS NFR BLD MANUAL: 2 % (ref 0–5)
WBC MORPH BLD: NORMAL
WBC NRBC COR # BLD: 8.08 10*3/MM3 (ref 3.4–10.8)

## 2023-09-06 PROCEDURE — 85007 BL SMEAR W/DIFF WBC COUNT: CPT | Performed by: INTERNAL MEDICINE

## 2023-09-06 PROCEDURE — 80048 BASIC METABOLIC PNL TOTAL CA: CPT | Performed by: INTERNAL MEDICINE

## 2023-09-06 PROCEDURE — 84443 ASSAY THYROID STIM HORMONE: CPT | Performed by: INTERNAL MEDICINE

## 2023-09-06 PROCEDURE — 84439 ASSAY OF FREE THYROXINE: CPT | Performed by: INTERNAL MEDICINE

## 2023-09-06 PROCEDURE — 36415 COLL VENOUS BLD VENIPUNCTURE: CPT | Performed by: INTERNAL MEDICINE

## 2023-09-06 PROCEDURE — 92134 CPTRZ OPH DX IMG PST SGM RTA: CPT | Performed by: OPHTHALMOLOGY

## 2023-09-06 PROCEDURE — 85027 COMPLETE CBC AUTOMATED: CPT | Performed by: INTERNAL MEDICINE

## 2023-09-06 PROCEDURE — 67028 INJECTION EYE DRUG: CPT | Performed by: OPHTHALMOLOGY

## 2023-09-06 ASSESSMENT — INTRAOCULAR PRESSURE
OD: 11
OS: 12

## 2023-10-16 ENCOUNTER — LAB REQUISITION (OUTPATIENT)
Dept: LAB | Facility: HOSPITAL | Age: 88
End: 2023-10-16
Payer: MEDICARE

## 2023-10-16 LAB
ANION GAP SERPL CALCULATED.3IONS-SCNC: 9 MMOL/L (ref 5–15)
BASOPHILS # BLD AUTO: 0.09 10*3/MM3 (ref 0–0.2)
BASOPHILS NFR BLD AUTO: 1.1 % (ref 0–1.5)
BUN SERPL-MCNC: 35 MG/DL (ref 8–23)
BUN/CREAT SERPL: 19.1 (ref 7–25)
CALCIUM SPEC-SCNC: 10.2 MG/DL (ref 8.2–9.6)
CHLORIDE SERPL-SCNC: 100 MMOL/L (ref 98–107)
CO2 SERPL-SCNC: 26 MMOL/L (ref 22–29)
CREAT SERPL-MCNC: 1.83 MG/DL (ref 0.57–1)
DEPRECATED RDW RBC AUTO: 59.1 FL (ref 37–54)
EGFRCR SERPLBLD CKD-EPI 2021: 25 ML/MIN/1.73
EOSINOPHIL # BLD AUTO: 0.39 10*3/MM3 (ref 0–0.4)
EOSINOPHIL NFR BLD AUTO: 4.7 % (ref 0.3–6.2)
ERYTHROCYTE [DISTWIDTH] IN BLOOD BY AUTOMATED COUNT: 16.9 % (ref 12.3–15.4)
GLUCOSE SERPL-MCNC: 121 MG/DL (ref 65–99)
HCT VFR BLD AUTO: 36.2 % (ref 34–46.6)
HGB BLD-MCNC: 11.2 G/DL (ref 12–15.9)
IMM GRANULOCYTES # BLD AUTO: 0.09 10*3/MM3 (ref 0–0.05)
IMM GRANULOCYTES NFR BLD AUTO: 1.1 % (ref 0–0.5)
LYMPHOCYTES # BLD AUTO: 1.63 10*3/MM3 (ref 0.7–3.1)
LYMPHOCYTES NFR BLD AUTO: 19.6 % (ref 19.6–45.3)
MCH RBC QN AUTO: 29.5 PG (ref 26.6–33)
MCHC RBC AUTO-ENTMCNC: 30.9 G/DL (ref 31.5–35.7)
MCV RBC AUTO: 95.3 FL (ref 79–97)
MONOCYTES # BLD AUTO: 0.77 10*3/MM3 (ref 0.1–0.9)
MONOCYTES NFR BLD AUTO: 9.2 % (ref 5–12)
NEUTROPHILS NFR BLD AUTO: 5.36 10*3/MM3 (ref 1.7–7)
NEUTROPHILS NFR BLD AUTO: 64.3 % (ref 42.7–76)
NRBC BLD AUTO-RTO: 0 /100 WBC (ref 0–0.2)
PLATELET # BLD AUTO: 298 10*3/MM3 (ref 140–450)
PMV BLD AUTO: 10.5 FL (ref 6–12)
POTASSIUM SERPL-SCNC: 5.1 MMOL/L (ref 3.5–5.2)
RBC # BLD AUTO: 3.8 10*6/MM3 (ref 3.77–5.28)
SODIUM SERPL-SCNC: 135 MMOL/L (ref 136–145)
WBC NRBC COR # BLD: 8.33 10*3/MM3 (ref 3.4–10.8)

## 2023-10-16 PROCEDURE — 80048 BASIC METABOLIC PNL TOTAL CA: CPT | Performed by: INTERNAL MEDICINE

## 2023-10-16 PROCEDURE — 85025 COMPLETE CBC W/AUTO DIFF WBC: CPT | Performed by: INTERNAL MEDICINE

## 2023-10-16 PROCEDURE — 36415 COLL VENOUS BLD VENIPUNCTURE: CPT | Performed by: INTERNAL MEDICINE

## 2023-10-19 ENCOUNTER — LAB REQUISITION (OUTPATIENT)
Dept: LAB | Facility: HOSPITAL | Age: 88
End: 2023-10-19
Payer: MEDICARE

## 2023-10-19 LAB
BACTERIA UR QL AUTO: ABNORMAL /HPF
BILIRUB UR QL STRIP: NEGATIVE
CLARITY UR: CLEAR
COLOR UR: YELLOW
GLUCOSE UR STRIP-MCNC: NEGATIVE MG/DL
HGB UR QL STRIP.AUTO: NEGATIVE
HYALINE CASTS UR QL AUTO: ABNORMAL /LPF
KETONES UR QL STRIP: ABNORMAL
LEUKOCYTE ESTERASE UR QL STRIP.AUTO: ABNORMAL
NITRITE UR QL STRIP: NEGATIVE
PH UR STRIP.AUTO: <=5 [PH] (ref 5–8)
PROT UR QL STRIP: ABNORMAL
RBC # UR STRIP: ABNORMAL /HPF
REF LAB TEST METHOD: ABNORMAL
SP GR UR STRIP: 1.02 (ref 1–1.03)
SQUAMOUS #/AREA URNS HPF: ABNORMAL /HPF
UROBILINOGEN UR QL STRIP: ABNORMAL
WBC # UR STRIP: ABNORMAL /HPF

## 2023-10-19 PROCEDURE — 87086 URINE CULTURE/COLONY COUNT: CPT | Performed by: NURSE PRACTITIONER

## 2023-10-19 PROCEDURE — 81001 URINALYSIS AUTO W/SCOPE: CPT | Performed by: NURSE PRACTITIONER

## 2023-10-19 PROCEDURE — 87186 SC STD MICRODIL/AGAR DIL: CPT | Performed by: NURSE PRACTITIONER

## 2023-10-21 LAB — BACTERIA SPEC AEROBE CULT: ABNORMAL

## 2023-10-29 ENCOUNTER — LAB REQUISITION (OUTPATIENT)
Dept: LAB | Facility: HOSPITAL | Age: 88
End: 2023-10-29
Payer: MEDICARE

## 2023-10-29 LAB
FLUAV AG NPH QL: NEGATIVE
FLUBV AG NPH QL IA: NEGATIVE

## 2023-10-29 PROCEDURE — 87804 INFLUENZA ASSAY W/OPTIC: CPT | Performed by: NURSE PRACTITIONER

## 2023-11-01 ENCOUNTER — OFFICE VISIT (OUTPATIENT)
Dept: URBAN - METROPOLITAN AREA CLINIC 41 | Facility: CLINIC | Age: 88
End: 2023-11-01
Payer: MEDICARE

## 2023-11-01 DIAGNOSIS — H35.3211 EXUDATIVE AGE-RELATED MACULAR DEGENERATION, RIGHT EYE, WITH ACTIVE CHOROIDAL NEOVASCULARIZATION: Primary | ICD-10-CM

## 2023-11-01 PROCEDURE — 67028 INJECTION EYE DRUG: CPT | Performed by: OPHTHALMOLOGY

## 2023-11-01 PROCEDURE — 92134 CPTRZ OPH DX IMG PST SGM RTA: CPT | Performed by: OPHTHALMOLOGY

## 2023-11-01 ASSESSMENT — INTRAOCULAR PRESSURE
OD: 16
OS: 14

## 2023-12-13 ENCOUNTER — OFFICE VISIT (OUTPATIENT)
Dept: URBAN - METROPOLITAN AREA CLINIC 41 | Facility: CLINIC | Age: 88
End: 2023-12-13
Payer: MEDICARE

## 2023-12-13 PROCEDURE — 92134 CPTRZ OPH DX IMG PST SGM RTA: CPT | Performed by: OPHTHALMOLOGY

## 2023-12-13 PROCEDURE — 67028 INJECTION EYE DRUG: CPT | Performed by: OPHTHALMOLOGY

## 2023-12-13 ASSESSMENT — INTRAOCULAR PRESSURE
OS: 18
OD: 18

## 2023-12-26 NOTE — PROGRESS NOTES
[facility] - P[facility]    Today's Date: 12/29/2023     Patient Name: Kamille Saul  MRN: [id number]  CSN: [id number]  PCP: Shelley Tidwell MD  Referring Provider: No ref. provider found    SUBJECTIVE     Chief Complaint   Patient presents with   • Establish Care     Shelley Tidwell MD 0814/2023 -NP- SORE TOES, OVERGROWN TOENAILS- pt states     HPI: Kamille Saul, a 89 y.o.female, comes to clinic as a(n) new patient complaining of toenail/callus issues. Patient has h/o arthritis, CKD3, Fibromyalgia, Glaucoma, HTN, Hypothyroid . Patient is non-diabetic.  Currently residing at [facility].  Notes that her toenails are long, thick, crumbly and discolored. She is unable to care for them herself. She is primarily in a wheelchair. Admits pain at 4/10 level and described as aching and dull. Denies previous treatment. Denies any constitutional symptoms. No other pedal complaints at this time.    Past Medical History:   Diagnosis Date   • Actinic keratosis of scalp    • Anemia    • Arthritis    • Chronic kidney disease, stage III (moderate)    • Eczema    • Fibromyalgia    • Glaucoma     Right eye   • Hx of migraine headaches    • Hypertension    • Hypothyroidism    • Squamous cell carcinoma of scalp      Past Surgical History:   Procedure Laterality Date   • APPENDECTOMY     • CATARACT EXTRACTION     • ENDOSCOPY  06/07/2007    mild gastritis otherwise normal upper endoscopy   • HIP HEMIARTHROPLASTY Right 4/30/2023    Procedure: HIP HEMIARTHROPLASTY;  Surgeon: Germain Seo MD;  Location: [facility];  Service: Orthopedics;  Laterality: Right;   • HYSTERECTOMY     • SKIN GRAFT     • SKIN LESION EXCISION      cyst removal    • TONSILLECTOMY       Family History   Problem Relation Age of Onset   • Thrombocytopenia Mother    • Skin cancer Father      Social History     Socioeconomic History   • Marital status:    Tobacco Use   • Smoking status: Former   • Smokeless tobacco: Never   Vaping Use   • Vaping Use:  Never used   Substance and Sexual Activity   • Alcohol use: No     Comment: Occasional wine   • Drug use: No   • Sexual activity: Defer     Allergies   Allergen Reactions   • Biaxin [Clarithromycin] Unknown (See Comments)     PT cannot recall     Current Outpatient Medications   Medication Sig Dispense Refill   • acetaminophen (TYLENOL) 500 MG tablet Take 2 tablets by mouth Every 6 (Six) Hours As Needed for Mild Pain.     • allopurinol (ZYLOPRIM) 300 MG tablet Take 1 tablet by mouth Daily.     • bisacodyl (DULCOLAX) 10 MG suppository Insert 1 suppository into the rectum Daily As Needed for Constipation.     • brimonidine-timolol (COMBIGAN) 0.2-0.5 % ophthalmic solution Administer 1 drop to both eyes Every 12 (Twelve) Hours.     • cefdinir (OMNICEF) 300 MG capsule Take 1 capsule by mouth 2 (Two) Times a Day. 14 capsule 0   • cycloSPORINE (RESTASIS) 0.05 % ophthalmic emulsion Administer 1 drop to both eyes 2 (Two) Times a Day.     • HYDROcodone-acetaminophen (NORCO) 5-325 MG per tablet Take 1 tablet by mouth Every 8 (Eight) Hours As Needed for Moderate Pain. 3 tablet 0   • HYDROcodone-acetaminophen (NORCO) 5-325 MG per tablet Take 1 tablet by mouth Every 6 (Six) Hours As Needed for Moderate Pain for up to 8 doses. 8 tablet 0   • latanoprost (XALATAN) 0.005 % ophthalmic solution Administer 1 drop to both eyes Every Night.     • levothyroxine (SYNTHROID, LEVOTHROID) 50 MCG tablet Take 1 tablet by mouth Daily.     • loperamide (IMODIUM) 2 MG capsule Take 1 capsule by mouth 2 (Two) Times a Day As Needed for Diarrhea.     • loratadine (CLARITIN) 10 MG tablet Take 1 tablet by mouth Daily.     • Menthol, Topical Analgesic, (Biofreeze) 4 % gel Apply 1 application  topically Every 6 (Six) Hours As Needed (pain).     • naloxone (NARCAN) 4 MG/0.1ML nasal spray Call 911. Don't prime. [place] in 1 nostril for overdose. Repeat in 2-3 minutes in other nostril if no or minimal breathing/responsiveness. 2 each 0   • ondansetron ODT  (ZOFRAN-ODT) 4 MG disintegrating tablet Place 1 tablet on the tongue Every 6 (Six) Hours As Needed for Nausea for up to 10 doses. 10 tablet 0   • pantoprazole (Protonix) 40 MG EC tablet Take 1 tablet by mouth Daily. 30 tablet 0   • Polyethyl Glyc-Propyl Glyc PF (SYSTANE PF) 0.4-0.3 % solution ophthalmic solution Administer 1 drop to both eyes Every 1 (One) Hour As Needed (dry eyes).     • polyethylene glycol 17 g packet Take 17 g by mouth Daily.     • Potassium Gluconate 550 MG tablet Take 550 mg by mouth Daily.     • quinapril (ACCUPRIL) 40 MG tablet Take 1 tablet by mouth Daily.     • sennosides-docusate (PERICOLACE) 8.6-50 MG per tablet Take 1 tablet by mouth 2 (Two) Times a Day.       No current facility-administered medications for this visit.     Review of Systems   Constitutional:  Negative for chills and fever.   HENT:  Negative for congestion.    Respiratory:  Negative for shortness of breath.    Cardiovascular:  Negative for chest pain and leg swelling.   Gastrointestinal:  Negative for constipation, diarrhea, nausea and vomiting.   Musculoskeletal:  Positive for arthralgias and gait problem.        Foot pain   Skin:  Negative for wound.   Neurological:  Positive for numbness.   Psychiatric/Behavioral:  Negative for agitation.        OBJECTIVE     Vitals:    12/29/23 1043   BP: 146/74       PHYSICAL EXAM  GEN:   Accompanied by daughter.     Foot/Ankle Exam    GENERAL  Appearance:  appears stated age  Affect:  appropriate  Assistance:  wheelchair  Right shoe gear: casual shoe  Left shoe gear: casual shoe    VASCULAR     Right Foot Vascularity   Dorsalis pedis:  1+  Posterior tibial:  2+  Skin temperature:  warm  Edema grading:  None  CFT:  4  Pedal hair growth:  Absent  Varicosities:  mild varicosities     Left Foot Vascularity   Dorsalis pedis:  1+  Posterior tibial:  2+  Skin temperature:  warm  Edema grading:  None  CFT:  4  Pedal hair growth:  Absent  Varicosities:  mild varicosities     NEUROLOGIC      Right Foot Neurologic   Normal sensation    Light touch sensation: normal  Vibratory sensation: normal  Hot/Cold sensation: normal  Protective Sensation using [place]-Elaina Monofilament:   Sites intact: 10  Sites tested: 10     Left Foot Neurologic   Normal sensation    Light touch sensation: normal  Vibratory sensation: normal  Hot/Cold sensation:  normal  Protective Sensation using [place]-Elaina Monofilament:   Sites intact: 10  Sites tested: 10    MUSCULOSKELETAL     Right Foot Musculoskeletal   Ecchymosis:  none  Tenderness:  toenail problem    Arch:  Normal  Hallux valgus: Yes       Left Foot Musculoskeletal   Ecchymosis:  none  Tenderness:  toenail problem  Arch:  Normal    MUSCLE STRENGTH     Right Foot Muscle Strength   Foot dorsiflexion:  4  Foot plantar flexion:  4  Foot inversion:  4  Foot eversion:  4     Left Foot Muscle Strength   Foot dorsiflexion:  4  Foot plantar flexion:  4  Foot inversion:  4  Foot eversion:  4    RANGE OF MOTION     Right Foot Range of Motion   Foot and ankle ROM within normal limits       Left Foot Range of Motion   Foot and ankle ROM within normal limits      DERMATOLOGIC      Right Foot Dermatologic   Skin  Positive for atrophy.   Nails  1.  Positive for elongated, onychomycosis, abnormal thickness and subungual debris.  2.  Positive for elongated, onychomycosis, abnormal thickness and subungual debris.  3.  Positive for elongated, onychomycosis, abnormal thickness and subungual debris.  4.  Positive for elongated, onychomycosis, abnormal thickness and subungual debris.  5.  Positive for elongated, onychomycosis, abnormal thickness and subungual debris.     Left Foot Dermatologic   Skin  Positive for atrophy.   Nails  1.  Positive for elongated, onychomycosis, abnormal thickness and subungual debris.  2.  Positive for elongated, onychomycosis, abnormal thickness and subungual debris.  3.  Positive for elongated, onychomycosis, abnormal thickness and subungual debris.  4.   Positive for elongated, onychomycosis, abnormally thick and subungual debris.  5.  Positive for elongated, onychomycosis, abnormally thick and subungual debris.      RADIOLOGY/NUCLEAR:  No results found.    LABORATORY/CULTURE RESULTS:      PATHOLOGY RESULTS:       ASSESSMENT/PLAN     Diagnoses and all orders for this visit:    1. Onychomycosis (Primary)    2. Dystrophic nail    3. Pain due to onychomycosis of toenail    4. Gait difficulty    5. Stage 3 chronic kidney disease, unspecified whether stage 3a or 3b CKD      Comprehensive lower extremity examination and evaluation was performed.  Discussed findings and treatment plan including risks, benefits, and treatment options with patient in detail. Patient agreed with treatment plan.  After verbal consent obtained, nail(s) x10 debrided of length and thickness with nail nipper without incidence  Patient may maintain nails and calluses at home utilizing emery board or pumice stone between visits as needed   Continue use of assistive devices for gait support.  An After Visit Summary was printed and given to the patient at discharge, including (if requested) any available informative/educational handouts regarding diagnosis, treatment, or medications. All questions were answered to patient/family satisfaction. Should symptoms fail to improve or worsen they agree to call or return to clinic or to go to the Emergency Department. Discussed the importance of following up with any needed screening tests/labs/specialist appointments and any requested follow-up recommended by me today. Importance of maintaining follow-up discussed and patient accepts that missed appointments can delay diagnosis and potentially lead to worsening of conditions.  Return in about 3 months (around 3/29/2024) for Follow-up with Podiatry APRN, Schedule [facility]., or sooner if acute issues arise.    Lab Frequency Next Occurrence       This document has been electronically signed by Silvio  WINSTON Wilcox DPM on December 29, 2023 10:53 CST

## 2023-12-28 ENCOUNTER — TELEPHONE (OUTPATIENT)
Dept: PODIATRY | Facility: CLINIC | Age: 88
End: 2023-12-28
Payer: MEDICARE

## 2023-12-29 ENCOUNTER — OFFICE VISIT (OUTPATIENT)
Dept: PODIATRY | Facility: CLINIC | Age: 88
End: 2023-12-29
Payer: MEDICARE

## 2023-12-29 ENCOUNTER — PATIENT ROUNDING (BHMG ONLY) (OUTPATIENT)
Dept: VASCULAR SURGERY | Facility: CLINIC | Age: 88
End: 2023-12-29
Payer: MEDICARE

## 2023-12-29 VITALS
BODY MASS INDEX: 21.66 KG/M2 | WEIGHT: 130 LBS | DIASTOLIC BLOOD PRESSURE: 74 MMHG | HEIGHT: 65 IN | SYSTOLIC BLOOD PRESSURE: 146 MMHG

## 2023-12-29 DIAGNOSIS — R26.9 GAIT DIFFICULTY: ICD-10-CM

## 2023-12-29 DIAGNOSIS — B35.1 ONYCHOMYCOSIS: Primary | ICD-10-CM

## 2023-12-29 DIAGNOSIS — M20.11 HALLUX VALGUS, RIGHT: ICD-10-CM

## 2023-12-29 DIAGNOSIS — M79.676 PAIN DUE TO ONYCHOMYCOSIS OF TOENAIL: ICD-10-CM

## 2023-12-29 DIAGNOSIS — N18.30 STAGE 3 CHRONIC KIDNEY DISEASE, UNSPECIFIED WHETHER STAGE 3A OR 3B CKD: ICD-10-CM

## 2023-12-29 DIAGNOSIS — L60.3 DYSTROPHIC NAIL: ICD-10-CM

## 2023-12-29 DIAGNOSIS — B35.1 PAIN DUE TO ONYCHOMYCOSIS OF TOENAIL: ICD-10-CM

## 2023-12-29 NOTE — PROGRESS NOTES
December 29, 2023    Hello, may I speak with Kamille Saul?    My name is Christel Juan      I am  with [facility]  [address], [address]  [place] [address] [phone].    Before we get started may I verify your date of birth? 3/22/1927    I am calling to officially welcome you to our practice and ask about your recent visit. Is this a good time to talk? yes    Tell me about your visit with us. What things went well?  Everything went well, and they got the job done.       We're always looking for ways to make our patients' experiences even better. Do you have recommendations on ways we may improve?  no    Overall were you satisfied with your first visit to our practice? yes       I appreciate you taking the time to speak with me today. Is there anything else I can do for you? no      Thank you, and have a great day.

## 2024-01-23 ENCOUNTER — LAB REQUISITION (OUTPATIENT)
Dept: LAB | Facility: HOSPITAL | Age: 89
End: 2024-01-23
Payer: MEDICARE

## 2024-01-23 DIAGNOSIS — D63.1 ANEMIA IN CHRONIC KIDNEY DISEASE (CODE): ICD-10-CM

## 2024-01-23 DIAGNOSIS — N18.30 CHRONIC KIDNEY DISEASE, STAGE 3 UNSPECIFIED: ICD-10-CM

## 2024-01-23 LAB
ANION GAP SERPL CALCULATED.3IONS-SCNC: 14 MMOL/L (ref 5–15)
BASOPHILS # BLD AUTO: 0.09 10*3/MM3 (ref 0–0.2)
BASOPHILS NFR BLD AUTO: 1.2 % (ref 0–1.5)
BUN SERPL-MCNC: 58 MG/DL (ref 8–23)
BUN/CREAT SERPL: 21.2 (ref 7–25)
CALCIUM SPEC-SCNC: 10.3 MG/DL (ref 8.2–9.6)
CHLORIDE SERPL-SCNC: 99 MMOL/L (ref 98–107)
CO2 SERPL-SCNC: 25 MMOL/L (ref 22–29)
CREAT SERPL-MCNC: 2.74 MG/DL (ref 0.57–1)
DEPRECATED RDW RBC AUTO: 54.7 FL (ref 37–54)
EGFRCR SERPLBLD CKD-EPI 2021: 15.4 ML/MIN/1.73
EOSINOPHIL # BLD AUTO: 0.5 10*3/MM3 (ref 0–0.4)
EOSINOPHIL NFR BLD AUTO: 6.4 % (ref 0.3–6.2)
ERYTHROCYTE [DISTWIDTH] IN BLOOD BY AUTOMATED COUNT: 14.8 % (ref 12.3–15.4)
GLUCOSE SERPL-MCNC: 116 MG/DL (ref 65–99)
HCT VFR BLD AUTO: 31.3 % (ref 34–46.6)
HGB BLD-MCNC: 9.7 G/DL (ref 12–15.9)
IMM GRANULOCYTES # BLD AUTO: 0.05 10*3/MM3 (ref 0–0.05)
IMM GRANULOCYTES NFR BLD AUTO: 0.6 % (ref 0–0.5)
LYMPHOCYTES # BLD AUTO: 1.88 10*3/MM3 (ref 0.7–3.1)
LYMPHOCYTES NFR BLD AUTO: 24.2 % (ref 19.6–45.3)
MCH RBC QN AUTO: 31.9 PG (ref 26.6–33)
MCHC RBC AUTO-ENTMCNC: 31 G/DL (ref 31.5–35.7)
MCV RBC AUTO: 103 FL (ref 79–97)
MONOCYTES # BLD AUTO: 0.69 10*3/MM3 (ref 0.1–0.9)
MONOCYTES NFR BLD AUTO: 8.9 % (ref 5–12)
NEUTROPHILS NFR BLD AUTO: 4.56 10*3/MM3 (ref 1.7–7)
NEUTROPHILS NFR BLD AUTO: 58.7 % (ref 42.7–76)
NRBC BLD AUTO-RTO: 0 /100 WBC (ref 0–0.2)
PLATELET # BLD AUTO: 271 10*3/MM3 (ref 140–450)
PMV BLD AUTO: 10.3 FL (ref 6–12)
POTASSIUM SERPL-SCNC: 4.6 MMOL/L (ref 3.5–5.2)
RBC # BLD AUTO: 3.04 10*6/MM3 (ref 3.77–5.28)
SODIUM SERPL-SCNC: 138 MMOL/L (ref 136–145)
WBC NRBC COR # BLD AUTO: 7.77 10*3/MM3 (ref 3.4–10.8)

## 2024-01-23 PROCEDURE — 85025 COMPLETE CBC W/AUTO DIFF WBC: CPT | Performed by: PHYSICIAN ASSISTANT

## 2024-01-23 PROCEDURE — 36415 COLL VENOUS BLD VENIPUNCTURE: CPT | Performed by: PHYSICIAN ASSISTANT

## 2024-01-23 PROCEDURE — 80048 BASIC METABOLIC PNL TOTAL CA: CPT | Performed by: PHYSICIAN ASSISTANT

## 2024-01-29 ENCOUNTER — LAB REQUISITION (OUTPATIENT)
Dept: LAB | Facility: HOSPITAL | Age: 89
End: 2024-01-29
Payer: MEDICARE

## 2024-01-29 DIAGNOSIS — I12.9 HYPERTENSIVE CHRONIC KIDNEY DISEASE WITH STAGE 1 THROUGH STAGE 4 CHRONIC KIDNEY DISEASE, OR UNSPECIFIED CHRONIC KIDNEY DISEASE: ICD-10-CM

## 2024-01-29 DIAGNOSIS — D63.1 ANEMIA IN CHRONIC KIDNEY DISEASE (CODE): ICD-10-CM

## 2024-01-29 LAB
ANION GAP SERPL CALCULATED.3IONS-SCNC: 12 MMOL/L (ref 5–15)
BASOPHILS # BLD AUTO: 0.07 10*3/MM3 (ref 0–0.2)
BASOPHILS NFR BLD AUTO: 1 % (ref 0–1.5)
BUN SERPL-MCNC: 54 MG/DL (ref 8–23)
BUN/CREAT SERPL: 24.4 (ref 7–25)
CALCIUM SPEC-SCNC: 10.2 MG/DL (ref 8.2–9.6)
CHLORIDE SERPL-SCNC: 100 MMOL/L (ref 98–107)
CO2 SERPL-SCNC: 26 MMOL/L (ref 22–29)
CREAT SERPL-MCNC: 2.21 MG/DL (ref 0.57–1)
DEPRECATED RDW RBC AUTO: 54.5 FL (ref 37–54)
EGFRCR SERPLBLD CKD-EPI 2021: 19.9 ML/MIN/1.73
EOSINOPHIL # BLD AUTO: 0.41 10*3/MM3 (ref 0–0.4)
EOSINOPHIL NFR BLD AUTO: 6 % (ref 0.3–6.2)
ERYTHROCYTE [DISTWIDTH] IN BLOOD BY AUTOMATED COUNT: 14.6 % (ref 12.3–15.4)
GLUCOSE SERPL-MCNC: 153 MG/DL (ref 65–99)
HCT VFR BLD AUTO: 35.2 % (ref 34–46.6)
HGB BLD-MCNC: 10.8 G/DL (ref 12–15.9)
IMM GRANULOCYTES # BLD AUTO: 0.06 10*3/MM3 (ref 0–0.05)
IMM GRANULOCYTES NFR BLD AUTO: 0.9 % (ref 0–0.5)
LYMPHOCYTES # BLD AUTO: 1.75 10*3/MM3 (ref 0.7–3.1)
LYMPHOCYTES NFR BLD AUTO: 25.6 % (ref 19.6–45.3)
MCH RBC QN AUTO: 31.6 PG (ref 26.6–33)
MCHC RBC AUTO-ENTMCNC: 30.7 G/DL (ref 31.5–35.7)
MCV RBC AUTO: 102.9 FL (ref 79–97)
MONOCYTES # BLD AUTO: 0.47 10*3/MM3 (ref 0.1–0.9)
MONOCYTES NFR BLD AUTO: 6.9 % (ref 5–12)
NEUTROPHILS NFR BLD AUTO: 4.07 10*3/MM3 (ref 1.7–7)
NEUTROPHILS NFR BLD AUTO: 59.6 % (ref 42.7–76)
NRBC BLD AUTO-RTO: 0 /100 WBC (ref 0–0.2)
PLATELET # BLD AUTO: 278 10*3/MM3 (ref 140–450)
PMV BLD AUTO: 10 FL (ref 6–12)
POTASSIUM SERPL-SCNC: 4.9 MMOL/L (ref 3.5–5.2)
RBC # BLD AUTO: 3.42 10*6/MM3 (ref 3.77–5.28)
SODIUM SERPL-SCNC: 138 MMOL/L (ref 136–145)
WBC NRBC COR # BLD AUTO: 6.83 10*3/MM3 (ref 3.4–10.8)

## 2024-01-29 PROCEDURE — 85025 COMPLETE CBC W/AUTO DIFF WBC: CPT | Performed by: PHYSICIAN ASSISTANT

## 2024-01-29 PROCEDURE — 80048 BASIC METABOLIC PNL TOTAL CA: CPT | Performed by: PHYSICIAN ASSISTANT

## 2024-02-07 ENCOUNTER — OFFICE VISIT (OUTPATIENT)
Dept: URBAN - METROPOLITAN AREA CLINIC 41 | Facility: CLINIC | Age: 89
End: 2024-02-07
Payer: MEDICARE

## 2024-02-07 DIAGNOSIS — H35.372 PUCKERING OF MACULA, LEFT EYE: ICD-10-CM

## 2024-02-07 DIAGNOSIS — H35.3122 NEXDTVE AGE-RELATED MCLR DEGN, LEFT EYE, INTERMED DRY STAGE: ICD-10-CM

## 2024-02-07 DIAGNOSIS — H35.3211 EXUDATIVE AGE-RELATED MACULAR DEGENERATION, RIGHT EYE, WITH ACTIVE CHOROIDAL NEOVASCULARIZATION: Primary | ICD-10-CM

## 2024-02-07 DIAGNOSIS — Z96.1 PRESENCE OF PSEUDOPHAKIA: ICD-10-CM

## 2024-02-07 PROCEDURE — 67028 INJECTION EYE DRUG: CPT | Performed by: OPHTHALMOLOGY

## 2024-02-07 PROCEDURE — 92014 COMPRE OPH EXAM EST PT 1/>: CPT | Performed by: OPHTHALMOLOGY

## 2024-02-07 ASSESSMENT — INTRAOCULAR PRESSURE
OD: 12
OS: 12

## 2024-02-12 ENCOUNTER — LAB REQUISITION (OUTPATIENT)
Dept: LAB | Facility: HOSPITAL | Age: 89
End: 2024-02-12
Payer: MEDICARE

## 2024-02-12 DIAGNOSIS — I12.9 HYPERTENSIVE CHRONIC KIDNEY DISEASE WITH STAGE 1 THROUGH STAGE 4 CHRONIC KIDNEY DISEASE, OR UNSPECIFIED CHRONIC KIDNEY DISEASE: ICD-10-CM

## 2024-02-12 DIAGNOSIS — E63.1 IMBALANCE OF CONSTITUENTS OF FOOD INTAKE: ICD-10-CM

## 2024-02-12 LAB
ANION GAP SERPL CALCULATED.3IONS-SCNC: 11 MMOL/L (ref 5–15)
BASOPHILS # BLD AUTO: 0.07 10*3/MM3 (ref 0–0.2)
BASOPHILS NFR BLD AUTO: 1 % (ref 0–1.5)
BUN SERPL-MCNC: 55 MG/DL (ref 8–23)
BUN/CREAT SERPL: 23.4 (ref 7–25)
CALCIUM SPEC-SCNC: 10.3 MG/DL (ref 8.2–9.6)
CHLORIDE SERPL-SCNC: 100 MMOL/L (ref 98–107)
CO2 SERPL-SCNC: 26 MMOL/L (ref 22–29)
CREAT SERPL-MCNC: 2.35 MG/DL (ref 0.57–1)
DEPRECATED RDW RBC AUTO: 52.6 FL (ref 37–54)
EGFRCR SERPLBLD CKD-EPI 2021: 18.5 ML/MIN/1.73
EOSINOPHIL # BLD AUTO: 0.59 10*3/MM3 (ref 0–0.4)
EOSINOPHIL NFR BLD AUTO: 8.8 % (ref 0.3–6.2)
ERYTHROCYTE [DISTWIDTH] IN BLOOD BY AUTOMATED COUNT: 14.4 % (ref 12.3–15.4)
GLUCOSE SERPL-MCNC: 115 MG/DL (ref 65–99)
HCT VFR BLD AUTO: 33.3 % (ref 34–46.6)
HGB BLD-MCNC: 10.2 G/DL (ref 12–15.9)
IMM GRANULOCYTES # BLD AUTO: 0.04 10*3/MM3 (ref 0–0.05)
IMM GRANULOCYTES NFR BLD AUTO: 0.6 % (ref 0–0.5)
LYMPHOCYTES # BLD AUTO: 1.78 10*3/MM3 (ref 0.7–3.1)
LYMPHOCYTES NFR BLD AUTO: 26.4 % (ref 19.6–45.3)
MCH RBC QN AUTO: 31 PG (ref 26.6–33)
MCHC RBC AUTO-ENTMCNC: 30.6 G/DL (ref 31.5–35.7)
MCV RBC AUTO: 101.2 FL (ref 79–97)
MONOCYTES # BLD AUTO: 0.52 10*3/MM3 (ref 0.1–0.9)
MONOCYTES NFR BLD AUTO: 7.7 % (ref 5–12)
NEUTROPHILS NFR BLD AUTO: 3.73 10*3/MM3 (ref 1.7–7)
NEUTROPHILS NFR BLD AUTO: 55.5 % (ref 42.7–76)
NRBC BLD AUTO-RTO: 0 /100 WBC (ref 0–0.2)
PLATELET # BLD AUTO: 321 10*3/MM3 (ref 140–450)
PMV BLD AUTO: 9.7 FL (ref 6–12)
POTASSIUM SERPL-SCNC: 4.5 MMOL/L (ref 3.5–5.2)
RBC # BLD AUTO: 3.29 10*6/MM3 (ref 3.77–5.28)
SODIUM SERPL-SCNC: 137 MMOL/L (ref 136–145)
WBC NRBC COR # BLD AUTO: 6.73 10*3/MM3 (ref 3.4–10.8)

## 2024-02-12 PROCEDURE — 80048 BASIC METABOLIC PNL TOTAL CA: CPT | Performed by: PHYSICIAN ASSISTANT

## 2024-02-12 PROCEDURE — 36415 COLL VENOUS BLD VENIPUNCTURE: CPT | Performed by: PHYSICIAN ASSISTANT

## 2024-02-12 PROCEDURE — 85025 COMPLETE CBC W/AUTO DIFF WBC: CPT | Performed by: PHYSICIAN ASSISTANT

## 2024-02-19 ENCOUNTER — LAB REQUISITION (OUTPATIENT)
Dept: LAB | Facility: HOSPITAL | Age: 89
End: 2024-02-19
Payer: MEDICARE

## 2024-02-19 DIAGNOSIS — I12.9 HYPERTENSIVE CHRONIC KIDNEY DISEASE WITH STAGE 1 THROUGH STAGE 4 CHRONIC KIDNEY DISEASE, OR UNSPECIFIED CHRONIC KIDNEY DISEASE: ICD-10-CM

## 2024-02-19 DIAGNOSIS — D63.1 ANEMIA IN CHRONIC KIDNEY DISEASE (CODE): ICD-10-CM

## 2024-02-19 LAB
ANION GAP SERPL CALCULATED.3IONS-SCNC: 13 MMOL/L (ref 5–15)
BUN SERPL-MCNC: 44 MG/DL (ref 8–23)
BUN/CREAT SERPL: 20.8 (ref 7–25)
CALCIUM SPEC-SCNC: 9.9 MG/DL (ref 8.2–9.6)
CHLORIDE SERPL-SCNC: 101 MMOL/L (ref 98–107)
CO2 SERPL-SCNC: 25 MMOL/L (ref 22–29)
CREAT SERPL-MCNC: 2.12 MG/DL (ref 0.57–1)
EGFRCR SERPLBLD CKD-EPI 2021: 21 ML/MIN/1.73
GLUCOSE SERPL-MCNC: 133 MG/DL (ref 65–99)
POTASSIUM SERPL-SCNC: 4.6 MMOL/L (ref 3.5–5.2)
SODIUM SERPL-SCNC: 139 MMOL/L (ref 136–145)

## 2024-02-19 PROCEDURE — 36415 COLL VENOUS BLD VENIPUNCTURE: CPT | Performed by: PHYSICIAN ASSISTANT

## 2024-02-19 PROCEDURE — 80048 BASIC METABOLIC PNL TOTAL CA: CPT | Performed by: PHYSICIAN ASSISTANT

## 2024-02-27 ENCOUNTER — LAB REQUISITION (OUTPATIENT)
Dept: LAB | Facility: HOSPITAL | Age: 89
End: 2024-02-27
Payer: MEDICARE

## 2024-02-27 DIAGNOSIS — I12.9 HYPERTENSIVE CHRONIC KIDNEY DISEASE WITH STAGE 1 THROUGH STAGE 4 CHRONIC KIDNEY DISEASE, OR UNSPECIFIED CHRONIC KIDNEY DISEASE: ICD-10-CM

## 2024-02-27 DIAGNOSIS — D63.1 ANEMIA IN CHRONIC KIDNEY DISEASE (CODE): ICD-10-CM

## 2024-02-27 LAB
ANION GAP SERPL CALCULATED.3IONS-SCNC: 13 MMOL/L (ref 5–15)
BASOPHILS # BLD AUTO: 0.07 10*3/MM3 (ref 0–0.2)
BASOPHILS NFR BLD AUTO: 1 % (ref 0–1.5)
BUN SERPL-MCNC: 44 MG/DL (ref 8–23)
BUN/CREAT SERPL: 21 (ref 7–25)
CALCIUM SPEC-SCNC: 10.9 MG/DL (ref 8.2–9.6)
CHLORIDE SERPL-SCNC: 102 MMOL/L (ref 98–107)
CO2 SERPL-SCNC: 25 MMOL/L (ref 22–29)
CREAT SERPL-MCNC: 2.1 MG/DL (ref 0.57–1)
DEPRECATED RDW RBC AUTO: 54.6 FL (ref 37–54)
EGFRCR SERPLBLD CKD-EPI 2021: 21.2 ML/MIN/1.73
EOSINOPHIL # BLD AUTO: 0.47 10*3/MM3 (ref 0–0.4)
EOSINOPHIL NFR BLD AUTO: 6.7 % (ref 0.3–6.2)
ERYTHROCYTE [DISTWIDTH] IN BLOOD BY AUTOMATED COUNT: 14.9 % (ref 12.3–15.4)
GLUCOSE SERPL-MCNC: 115 MG/DL (ref 65–99)
HCT VFR BLD AUTO: 35.6 % (ref 34–46.6)
HGB BLD-MCNC: 11 G/DL (ref 12–15.9)
IMM GRANULOCYTES # BLD AUTO: 0.06 10*3/MM3 (ref 0–0.05)
IMM GRANULOCYTES NFR BLD AUTO: 0.9 % (ref 0–0.5)
LYMPHOCYTES # BLD AUTO: 1.83 10*3/MM3 (ref 0.7–3.1)
LYMPHOCYTES NFR BLD AUTO: 26.1 % (ref 19.6–45.3)
MCH RBC QN AUTO: 31.3 PG (ref 26.6–33)
MCHC RBC AUTO-ENTMCNC: 30.9 G/DL (ref 31.5–35.7)
MCV RBC AUTO: 101.4 FL (ref 79–97)
MONOCYTES # BLD AUTO: 0.61 10*3/MM3 (ref 0.1–0.9)
MONOCYTES NFR BLD AUTO: 8.7 % (ref 5–12)
NEUTROPHILS NFR BLD AUTO: 3.98 10*3/MM3 (ref 1.7–7)
NEUTROPHILS NFR BLD AUTO: 56.6 % (ref 42.7–76)
NRBC BLD AUTO-RTO: 0 /100 WBC (ref 0–0.2)
PLATELET # BLD AUTO: 292 10*3/MM3 (ref 140–450)
PMV BLD AUTO: 9.8 FL (ref 6–12)
POTASSIUM SERPL-SCNC: 3.8 MMOL/L (ref 3.5–5.2)
RBC # BLD AUTO: 3.51 10*6/MM3 (ref 3.77–5.28)
SODIUM SERPL-SCNC: 140 MMOL/L (ref 136–145)
WBC NRBC COR # BLD AUTO: 7.02 10*3/MM3 (ref 3.4–10.8)

## 2024-02-27 PROCEDURE — 36415 COLL VENOUS BLD VENIPUNCTURE: CPT | Performed by: PHYSICIAN ASSISTANT

## 2024-02-27 PROCEDURE — 80048 BASIC METABOLIC PNL TOTAL CA: CPT | Performed by: PHYSICIAN ASSISTANT

## 2024-02-27 PROCEDURE — 85025 COMPLETE CBC W/AUTO DIFF WBC: CPT | Performed by: PHYSICIAN ASSISTANT

## 2024-03-04 ENCOUNTER — LAB REQUISITION (OUTPATIENT)
Dept: LAB | Facility: HOSPITAL | Age: 89
End: 2024-03-04
Payer: MEDICARE

## 2024-03-04 DIAGNOSIS — I12.9 HYPERTENSIVE CHRONIC KIDNEY DISEASE WITH STAGE 1 THROUGH STAGE 4 CHRONIC KIDNEY DISEASE, OR UNSPECIFIED CHRONIC KIDNEY DISEASE: ICD-10-CM

## 2024-03-04 DIAGNOSIS — E03.9 HYPOTHYROIDISM, UNSPECIFIED: ICD-10-CM

## 2024-03-04 DIAGNOSIS — D63.1 ANEMIA IN CHRONIC KIDNEY DISEASE (CODE): ICD-10-CM

## 2024-03-04 LAB
ANION GAP SERPL CALCULATED.3IONS-SCNC: 8 MMOL/L (ref 5–15)
BASOPHILS # BLD AUTO: 0.08 10*3/MM3 (ref 0–0.2)
BASOPHILS NFR BLD AUTO: 1.2 % (ref 0–1.5)
BUN SERPL-MCNC: 38 MG/DL (ref 8–23)
BUN/CREAT SERPL: 20.4 (ref 7–25)
CALCIUM SPEC-SCNC: 10.4 MG/DL (ref 8.2–9.6)
CHLORIDE SERPL-SCNC: 102 MMOL/L (ref 98–107)
CO2 SERPL-SCNC: 27 MMOL/L (ref 22–29)
CREAT SERPL-MCNC: 1.86 MG/DL (ref 0.57–1)
DEPRECATED RDW RBC AUTO: 53.4 FL (ref 37–54)
EGFRCR SERPLBLD CKD-EPI 2021: 24.5 ML/MIN/1.73
EOSINOPHIL # BLD AUTO: 0.58 10*3/MM3 (ref 0–0.4)
EOSINOPHIL NFR BLD AUTO: 8.8 % (ref 0.3–6.2)
ERYTHROCYTE [DISTWIDTH] IN BLOOD BY AUTOMATED COUNT: 14.7 % (ref 12.3–15.4)
GLUCOSE SERPL-MCNC: 92 MG/DL (ref 65–99)
HCT VFR BLD AUTO: 32.3 % (ref 34–46.6)
HGB BLD-MCNC: 10.2 G/DL (ref 12–15.9)
IMM GRANULOCYTES # BLD AUTO: 0.03 10*3/MM3 (ref 0–0.05)
IMM GRANULOCYTES NFR BLD AUTO: 0.5 % (ref 0–0.5)
LYMPHOCYTES # BLD AUTO: 1.84 10*3/MM3 (ref 0.7–3.1)
LYMPHOCYTES NFR BLD AUTO: 27.9 % (ref 19.6–45.3)
MCH RBC QN AUTO: 31.4 PG (ref 26.6–33)
MCHC RBC AUTO-ENTMCNC: 31.6 G/DL (ref 31.5–35.7)
MCV RBC AUTO: 99.4 FL (ref 79–97)
MONOCYTES # BLD AUTO: 0.71 10*3/MM3 (ref 0.1–0.9)
MONOCYTES NFR BLD AUTO: 10.8 % (ref 5–12)
NEUTROPHILS NFR BLD AUTO: 3.35 10*3/MM3 (ref 1.7–7)
NEUTROPHILS NFR BLD AUTO: 50.8 % (ref 42.7–76)
NRBC BLD AUTO-RTO: 0 /100 WBC (ref 0–0.2)
PLATELET # BLD AUTO: 246 10*3/MM3 (ref 140–450)
PMV BLD AUTO: 9.8 FL (ref 6–12)
POTASSIUM SERPL-SCNC: 4.5 MMOL/L (ref 3.5–5.2)
RBC # BLD AUTO: 3.25 10*6/MM3 (ref 3.77–5.28)
SODIUM SERPL-SCNC: 137 MMOL/L (ref 136–145)
T4 FREE SERPL-MCNC: 1.38 NG/DL (ref 0.93–1.7)
TSH SERPL DL<=0.05 MIU/L-ACNC: 3.48 UIU/ML (ref 0.27–4.2)
WBC NRBC COR # BLD AUTO: 6.59 10*3/MM3 (ref 3.4–10.8)

## 2024-03-04 PROCEDURE — 85025 COMPLETE CBC W/AUTO DIFF WBC: CPT | Performed by: PHYSICIAN ASSISTANT

## 2024-03-04 PROCEDURE — 84443 ASSAY THYROID STIM HORMONE: CPT | Performed by: PHYSICIAN ASSISTANT

## 2024-03-04 PROCEDURE — 80048 BASIC METABOLIC PNL TOTAL CA: CPT | Performed by: PHYSICIAN ASSISTANT

## 2024-03-04 PROCEDURE — 84439 ASSAY OF FREE THYROXINE: CPT | Performed by: PHYSICIAN ASSISTANT

## 2024-03-04 PROCEDURE — 36415 COLL VENOUS BLD VENIPUNCTURE: CPT | Performed by: PHYSICIAN ASSISTANT

## 2024-04-01 NOTE — PROGRESS NOTES
[facility] - PODIATRY    Today's Date: 04/11/2024     Patient Name: Kamille Saul  MRN: [id number]  CSN: [id number]  PCP: Shelley Tidwell MD  Referring Provider: No ref. provider found    SUBJECTIVE     Chief Complaint   Patient presents with   • Follow-up     Shelley Tidwell MD 04/20/2023-3 MONTH FOLLOW UP-pt states she is here today for a toenail trim pt presents in wheel chair-pt denies pain     HPI: Kamille Saul, a 89 y.o.female, comes to clinic as a(n) established patient complaining of toenail/callus issues. Patient has h/o arthritis, CKD3, Fibromyalgia, Glaucoma, HTN, Hypothyroid . Patient is non-diabetic.  Currently residing at [facility].  Notes that her toenails are long, thick, crumbly and discolored. She is unable to care for them herself. She is primarily in a wheelchair/bed. Denies pain. Denies previous treatment. Denies any constitutional symptoms. No other pedal complaints at this time.    Past Medical History:   Diagnosis Date   • Actinic keratosis of scalp    • Anemia    • Arthritis    • Chronic kidney disease, stage III (moderate)    • Eczema    • Fibromyalgia    • Glaucoma     Right eye   • Hx of migraine headaches    • Hypertension    • Hypothyroidism    • Squamous cell carcinoma of scalp      Past Surgical History:   Procedure Laterality Date   • APPENDECTOMY     • CATARACT EXTRACTION     • ENDOSCOPY  06/07/2007    mild gastritis otherwise normal upper endoscopy   • HIP HEMIARTHROPLASTY Right 4/30/2023    Procedure: HIP HEMIARTHROPLASTY;  Surgeon: Germain Seo MD;  Location: [facility];  Service: Orthopedics;  Laterality: Right;   • HYSTERECTOMY     • SKIN GRAFT     • SKIN LESION EXCISION      cyst removal    • TONSILLECTOMY       Family History   Problem Relation Age of Onset   • Thrombocytopenia Mother    • Skin cancer Father      Social History     Socioeconomic History   • Marital status:    Tobacco Use   • Smoking status: Former     Passive exposure: Past   •  Smokeless tobacco: Never   Vaping Use   • Vaping status: Never Used   Substance and Sexual Activity   • Alcohol use: No     Comment: Occasional wine   • Drug use: No   • Sexual activity: Defer     Allergies   Allergen Reactions   • Biaxin [Clarithromycin] Unknown (See Comments)     PT cannot recall     Current Outpatient Medications   Medication Sig Dispense Refill   • acetaminophen (TYLENOL) 500 MG tablet Take 2 tablets by mouth Every 6 (Six) Hours As Needed for Mild Pain.     • allopurinol (ZYLOPRIM) 300 MG tablet Take 1 tablet by mouth Daily.     • benzocaine (HURRICAINE/ORAJEL) 20 % gel Apply  to the mouth or throat 4 (Four) Times a Day As Needed for Mucositis.     • bisacodyl (DULCOLAX) 10 MG suppository Insert 1 suppository into the rectum Daily As Needed for Constipation.     • brimonidine-timolol (COMBIGAN) 0.2-0.5 % ophthalmic solution Administer 1 drop to both eyes Every 12 (Twelve) Hours.     • LORazepam (ATIVAN) 2 MG/ML concentrated solution      • Menthol, Topical Analgesic, (Biofreeze) 4 % gel Apply 1 Application topically Every 6 (Six) Hours As Needed (pain).     • Menthol-Zinc Oxide 0.44-20.6 % ointment Apply 1 Application topically to the appropriate area as directed 2 (Two) Times a Day.     • morphine solution concentrated solution      • ondansetron ODT (ZOFRAN-ODT) 4 MG disintegrating tablet Place 1 tablet on the tongue Every 6 (Six) Hours As Needed for Nausea for up to 10 doses. 10 tablet 0     No current facility-administered medications for this visit.     Review of Systems   Constitutional:  Negative for chills and fever.   HENT:  Negative for congestion.    Respiratory:  Negative for shortness of breath.    Cardiovascular:  Negative for chest pain and leg swelling.   Gastrointestinal:  Negative for constipation, diarrhea, nausea and vomiting.   Musculoskeletal:  Positive for arthralgias and gait problem.   Skin:  Negative for wound.   Neurological:  Positive for numbness.    Psychiatric/Behavioral:  Negative for agitation.        OBJECTIVE     Vitals:    04/11/24 1055   BP: 114/62   Pulse: 58         PHYSICAL EXAM  GEN:   Accompanied by daughter/son.     Foot/Ankle Exam    GENERAL  Appearance:  appears stated age and elderly  Affect:  appropriate  Assistance:  wheelchair  Right shoe gear: casual shoe  Left shoe gear: casual shoe    VASCULAR     Right Foot Vascularity   Dorsalis pedis:  1+  Posterior tibial:  2+  Skin temperature:  warm  Edema grading:  None  CFT:  4  Pedal hair growth:  Absent  Varicosities:  mild varicosities     Left Foot Vascularity   Dorsalis pedis:  1+  Posterior tibial:  2+  Skin temperature:  warm  Edema grading:  None  CFT:  4  Pedal hair growth:  Absent  Varicosities:  mild varicosities     NEUROLOGIC     Right Foot Neurologic   Normal sensation    Light touch sensation: normal  Vibratory sensation: normal  Hot/Cold sensation: normal  Protective Sensation using [place]a Monofilament:   Sites intact: 10  Sites tested: 10     Left Foot Neurologic   Normal sensation    Light touch sensation: normal  Vibratory sensation: normal  Hot/Cold sensation:  normal  Protective Sensation using [place]a Monofilament:   Sites intact: 10  Sites tested: 10    MUSCULOSKELETAL     Right Foot Musculoskeletal   Ecchymosis:  none  Tenderness:  toenail problem    Arch:  Normal  Hallux valgus: Yes       Left Foot Musculoskeletal   Ecchymosis:  none  Tenderness:  toenail problem  Arch:  Normal    MUSCLE STRENGTH     Right Foot Muscle Strength   Foot dorsiflexion:  4  Foot plantar flexion:  4  Foot inversion:  4  Foot eversion:  4     Left Foot Muscle Strength   Foot dorsiflexion:  4  Foot plantar flexion:  4  Foot inversion:  4  Foot eversion:  4    RANGE OF MOTION     Right Foot Range of Motion   Foot and ankle ROM within normal limits       Left Foot Range of Motion   Foot and ankle ROM within normal limits      DERMATOLOGIC      Right Foot Dermatologic    Skin  Positive for atrophy.   Nails  1.  Positive for elongated, onychomycosis, abnormal thickness and subungual debris.  2.  Positive for elongated, onychomycosis, abnormal thickness and subungual debris.  3.  Positive for elongated, onychomycosis, abnormal thickness and subungual debris.  4.  Positive for elongated, onychomycosis, abnormal thickness and subungual debris.  5.  Positive for elongated, onychomycosis, abnormal thickness and subungual debris.     Left Foot Dermatologic   Skin  Positive for atrophy.   Nails  1.  Positive for elongated, onychomycosis, abnormal thickness and subungual debris.  2.  Positive for elongated, onychomycosis, abnormal thickness and subungual debris.  3.  Positive for elongated, onychomycosis, abnormal thickness and subungual debris.  4.  Positive for elongated, onychomycosis, abnormally thick and subungual debris.  5.  Positive for elongated, onychomycosis, abnormally thick and subungual debris.      RADIOLOGY/NUCLEAR:  No results found.    LABORATORY/CULTURE RESULTS:      PATHOLOGY RESULTS:       ASSESSMENT/PLAN     Diagnoses and all orders for this visit:    1. Onychomycosis (Primary)    2. Dystrophic nail    3. Pain due to onychomycosis of toenail    4. Stage 3 chronic kidney disease, unspecified whether stage 3a or 3b CKD    5. Gait difficulty    6. Hallux valgus, right    Comprehensive lower extremity examination and evaluation was performed.  Discussed findings and treatment plan including risks, benefits, and treatment options with patient in detail. Patient agreed with treatment plan.  After verbal consent obtained, nail(s) x10 debrided of length and thickness with nail nipper without incidence  Patient may maintain nails and calluses at home utilizing emery board or pumice stone between visits as needed   Continue use of assistive devices for gait support.  An After Visit Summary was printed and given to the patient at discharge, including (if requested) any available  informative/educational handouts regarding diagnosis, treatment, or medications. All questions were answered to patient/family satisfaction. Should symptoms fail to improve or worsen they agree to call or return to clinic or to go to the Emergency Department. Discussed the importance of following up with any needed screening tests/labs/specialist appointments and any requested follow-up recommended by me today. Importance of maintaining follow-up discussed and patient accepts that missed appointments can delay diagnosis and potentially lead to worsening of conditions.  Return in about 10 weeks (around 6/20/2024) for Schedule [facility], Follow-up with Podiatry APRN., or sooner if acute issues arise.    Lab Frequency Next Occurrence       This document has been electronically signed by CLRAK Lainez on April 11, 2024 12:00 CDT

## 2024-04-10 ENCOUNTER — TELEPHONE (OUTPATIENT)
Dept: PODIATRY | Facility: CLINIC | Age: 89
End: 2024-04-10
Payer: MEDICARE

## 2024-04-11 ENCOUNTER — PATIENT ROUNDING (BHMG ONLY) (OUTPATIENT)
Dept: PODIATRY | Facility: CLINIC | Age: 89
End: 2024-04-11

## 2024-04-11 ENCOUNTER — OFFICE VISIT (OUTPATIENT)
Dept: PODIATRY | Facility: CLINIC | Age: 89
End: 2024-04-11
Payer: MEDICARE

## 2024-04-11 VITALS
DIASTOLIC BLOOD PRESSURE: 62 MMHG | HEIGHT: 65 IN | SYSTOLIC BLOOD PRESSURE: 114 MMHG | BODY MASS INDEX: 21.66 KG/M2 | HEART RATE: 58 BPM | WEIGHT: 130 LBS

## 2024-04-11 DIAGNOSIS — B35.1 ONYCHOMYCOSIS: Primary | ICD-10-CM

## 2024-04-11 DIAGNOSIS — L60.3 DYSTROPHIC NAIL: ICD-10-CM

## 2024-04-11 DIAGNOSIS — R26.9 GAIT DIFFICULTY: ICD-10-CM

## 2024-04-11 DIAGNOSIS — M20.11 HALLUX VALGUS, RIGHT: ICD-10-CM

## 2024-04-11 DIAGNOSIS — M79.676 PAIN DUE TO ONYCHOMYCOSIS OF TOENAIL: ICD-10-CM

## 2024-04-11 DIAGNOSIS — B35.1 PAIN DUE TO ONYCHOMYCOSIS OF TOENAIL: ICD-10-CM

## 2024-04-11 DIAGNOSIS — N18.30 STAGE 3 CHRONIC KIDNEY DISEASE, UNSPECIFIED WHETHER STAGE 3A OR 3B CKD: ICD-10-CM

## 2024-04-11 RX ORDER — LORAZEPAM 2 MG/ML
CONCENTRATE ORAL
COMMUNITY
Start: 2024-03-28

## 2024-04-11 RX ORDER — MORPHINE SULFATE 20 MG/ML
SOLUTION ORAL
COMMUNITY
Start: 2024-03-28

## 2024-04-11 NOTE — PROGRESS NOTES
April 11, 2024    Hello, may I speak with Kamille Saul?    My name is JUNE      I am  with [facility]  [facility]  [address], [address]  [place] [address] [phone].    Before we get started may I verify your date of birth? 3/22/1927    I am calling to officially welcome you to our practice and ask about your recent visit. Is this a good time to talk? yes    Tell me about your visit with us. What things went well?  VISIT WENT WELL       We're always looking for ways to make our patients' experiences even better. Do you have recommendations on ways we may improve?  no    Overall were you satisfied with your visit to our practice? yes       I appreciate you taking the time to speak with me today. Is there anything else I can do for you? no      Thank you, and have a great day.

## 2024-04-17 ENCOUNTER — OFFICE VISIT (OUTPATIENT)
Dept: URBAN - METROPOLITAN AREA CLINIC 41 | Facility: CLINIC | Age: 89
End: 2024-04-17
Payer: MEDICARE

## 2024-04-17 DIAGNOSIS — H35.3211 EXUDATIVE AGE-RELATED MACULAR DEGENERATION, RIGHT EYE, WITH ACTIVE CHOROIDAL NEOVASCULARIZATION: Primary | ICD-10-CM

## 2024-04-17 PROCEDURE — 92134 CPTRZ OPH DX IMG PST SGM RTA: CPT | Performed by: OPHTHALMOLOGY

## 2024-04-17 PROCEDURE — 67028 INJECTION EYE DRUG: CPT | Performed by: OPHTHALMOLOGY

## 2024-04-17 ASSESSMENT — INTRAOCULAR PRESSURE
OS: 14
OD: 14

## 2024-06-21 ENCOUNTER — TELEPHONE (OUTPATIENT)
Dept: PODIATRY | Facility: CLINIC | Age: 89
End: 2024-06-21
Payer: MEDICARE

## 2024-06-21 ENCOUNTER — OFFICE VISIT (OUTPATIENT)
Dept: URBAN - METROPOLITAN AREA CLINIC 41 | Facility: CLINIC | Age: 89
End: 2024-06-21
Payer: MEDICARE

## 2024-06-21 DIAGNOSIS — H35.3211 EXUDATIVE AGE-RELATED MACULAR DEGENERATION, RIGHT EYE, WITH ACTIVE CHOROIDAL NEOVASCULARIZATION: Primary | ICD-10-CM

## 2024-06-21 PROCEDURE — 67028 INJECTION EYE DRUG: CPT | Performed by: OPHTHALMOLOGY

## 2024-06-21 PROCEDURE — 92134 CPTRZ OPH DX IMG PST SGM RTA: CPT | Performed by: OPHTHALMOLOGY

## 2024-06-21 ASSESSMENT — INTRAOCULAR PRESSURE
OD: 15
OS: 15

## 2024-08-30 ENCOUNTER — OFFICE VISIT (OUTPATIENT)
Dept: URBAN - METROPOLITAN AREA CLINIC 41 | Facility: CLINIC | Age: 89
End: 2024-08-30
Payer: MEDICARE

## 2024-08-30 DIAGNOSIS — H35.3211 EXUDATIVE AGE-RELATED MACULAR DEGENERATION, RIGHT EYE, WITH ACTIVE CHOROIDAL NEOVASCULARIZATION: Primary | ICD-10-CM

## 2024-08-30 PROCEDURE — 67028 INJECTION EYE DRUG: CPT | Performed by: OPHTHALMOLOGY

## 2024-08-30 PROCEDURE — 92134 CPTRZ OPH DX IMG PST SGM RTA: CPT | Performed by: OPHTHALMOLOGY

## 2024-08-30 ASSESSMENT — INTRAOCULAR PRESSURE
OD: 15
OS: 14

## 2024-10-23 NOTE — NURSING NOTE
[place] message sent to patient.   Aga DAVIS BSN, PHN, RN-[facility]  [phone]     Patient and daughter would like to talk more with SW and doctor about Home Health, possibly considering it now.

## 2024-11-01 ENCOUNTER — OFFICE VISIT (OUTPATIENT)
Dept: URBAN - METROPOLITAN AREA CLINIC 41 | Facility: CLINIC | Age: 89
End: 2024-11-01
Payer: MEDICARE

## 2024-11-01 DIAGNOSIS — H35.3122 NEXDTVE AGE-RELATED MCLR DEGN, LEFT EYE, INTERMED DRY STAGE: ICD-10-CM

## 2024-11-01 DIAGNOSIS — H35.3211 EXDTVE AGE-REL MCLR DEGN, RIGHT EYE, WITH ACTV CHRDL NEOVAS: Primary | ICD-10-CM

## 2024-11-01 DIAGNOSIS — H35.372 PUCKERING OF MACULA, LEFT EYE: ICD-10-CM

## 2024-11-01 DIAGNOSIS — Z96.1 PRESENCE OF PSEUDOPHAKIA: ICD-10-CM

## 2024-11-01 PROCEDURE — 99214 OFFICE O/P EST MOD 30 MIN: CPT | Performed by: OPHTHALMOLOGY

## 2024-11-01 PROCEDURE — 92134 CPTRZ OPH DX IMG PST SGM RTA: CPT | Performed by: OPHTHALMOLOGY

## 2024-11-01 PROCEDURE — 67028 INJECTION EYE DRUG: CPT | Performed by: OPHTHALMOLOGY

## 2024-11-01 ASSESSMENT — INTRAOCULAR PRESSURE
OS: 21
OD: 20

## 2025-01-22 ENCOUNTER — OFFICE VISIT (OUTPATIENT)
Dept: URBAN - METROPOLITAN AREA CLINIC 41 | Facility: CLINIC | Age: OVER 89
End: 2025-01-22
Payer: MEDICARE

## 2025-01-22 DIAGNOSIS — H35.3211 EXUDATIVE AGE-RELATED MACULAR DEGENERATION, RIGHT EYE, WITH ACTIVE CHOROIDAL NEOVASCULARIZATION: Primary | ICD-10-CM

## 2025-01-22 PROCEDURE — 67028 INJECTION EYE DRUG: CPT | Performed by: OPHTHALMOLOGY

## 2025-01-22 PROCEDURE — 92134 CPTRZ OPH DX IMG PST SGM RTA: CPT | Performed by: OPHTHALMOLOGY

## 2025-01-22 ASSESSMENT — INTRAOCULAR PRESSURE
OD: 17
OS: 19

## 2025-04-09 ENCOUNTER — OFFICE VISIT (OUTPATIENT)
Dept: URBAN - METROPOLITAN AREA CLINIC 41 | Facility: CLINIC | Age: OVER 89
End: 2025-04-09
Payer: MEDICARE

## 2025-04-09 DIAGNOSIS — H35.3211 EXUDATIVE AGE-RELATED MACULAR DEGENERATION, RIGHT EYE, WITH ACTIVE CHOROIDAL NEOVASCULARIZATION: Primary | ICD-10-CM

## 2025-04-09 PROCEDURE — 92134 CPTRZ OPH DX IMG PST SGM RTA: CPT | Performed by: OPHTHALMOLOGY

## 2025-04-09 PROCEDURE — 67028 INJECTION EYE DRUG: CPT | Performed by: OPHTHALMOLOGY

## 2025-04-09 ASSESSMENT — INTRAOCULAR PRESSURE
OD: 17
OS: 19

## 2025-06-25 ENCOUNTER — OFFICE VISIT (OUTPATIENT)
Dept: URBAN - METROPOLITAN AREA CLINIC 41 | Facility: CLINIC | Age: OVER 89
End: 2025-06-25
Payer: MEDICARE

## 2025-06-25 DIAGNOSIS — H35.3211 EXUDATIVE AGE-RELATED MACULAR DEGENERATION, RIGHT EYE, WITH ACTIVE CHOROIDAL NEOVASCULARIZATION: Primary | ICD-10-CM

## 2025-06-25 PROCEDURE — 67028 INJECTION EYE DRUG: CPT | Performed by: OPHTHALMOLOGY

## 2025-06-25 PROCEDURE — 92134 CPTRZ OPH DX IMG PST SGM RTA: CPT | Performed by: OPHTHALMOLOGY

## 2025-06-25 ASSESSMENT — INTRAOCULAR PRESSURE
OD: 13
OS: 13

## (undated) DEVICE — ANTIBACTERIAL UNDYED BRAIDED (POLYGLACTIN 910), SYNTHETIC ABSORBABLE SUTURE: Brand: COATED VICRYL

## (undated) DEVICE — Device

## (undated) DEVICE — PROXIMATE RH ROTATING HEAD SKIN STAPLERS (35 REGULAR) CONTAINS 35 STAINLESS STEEL STAPLES: Brand: PROXIMATE

## (undated) DEVICE — GLV SURG TRIUMPH PF LTX 7.5 STRL

## (undated) DEVICE — SHORT LENS-STERILE

## (undated) DEVICE — POOLE SUCTION INSTRUMENT WITH REMOVABLE SHEATH: Brand: POOLE

## (undated) DEVICE — SUT ETHIB 1 CT1 30IN  X425H

## (undated) DEVICE — 4-PORT MANIFOLD: Brand: NEPTUNE 2

## (undated) DEVICE — BAPTIST TURNOVER KIT: Brand: MEDLINE INDUSTRIES, INC.

## (undated) DEVICE — FEMORAL CANAL TIP, IRRIGATION/SUCTION

## (undated) DEVICE — OPTIFOAM GENTLE SA, POSTOP, 4X12: Brand: MEDLINE

## (undated) DEVICE — GLV SURG DERMASSURE GRN LF PF 8.0

## (undated) DEVICE — PK HIP TOTL 30

## (undated) DEVICE — Device: Brand: POWER-FLO®

## (undated) DEVICE — DRAPE,ORTHOMAX ,HIP,W/POUCHES: Brand: MEDLINE

## (undated) DEVICE — DUAL CUT SAGITTAL BLADE